# Patient Record
Sex: FEMALE | Employment: OTHER | URBAN - METROPOLITAN AREA
[De-identification: names, ages, dates, MRNs, and addresses within clinical notes are randomized per-mention and may not be internally consistent; named-entity substitution may affect disease eponyms.]

---

## 2020-12-27 ENCOUNTER — OFFICE VISIT (OUTPATIENT)
Dept: URGENT CARE | Facility: CLINIC | Age: 70
End: 2020-12-27
Payer: MEDICARE

## 2020-12-27 VITALS
HEART RATE: 60 BPM | TEMPERATURE: 98 F | SYSTOLIC BLOOD PRESSURE: 163 MMHG | WEIGHT: 214 LBS | OXYGEN SATURATION: 97 % | HEIGHT: 64 IN | RESPIRATION RATE: 16 BRPM | BODY MASS INDEX: 36.54 KG/M2 | DIASTOLIC BLOOD PRESSURE: 75 MMHG

## 2020-12-27 DIAGNOSIS — H00.012 HORDEOLUM EXTERNUM OF RIGHT LOWER EYELID: Primary | ICD-10-CM

## 2020-12-27 PROCEDURE — 99214 OFFICE O/P EST MOD 30 MIN: CPT | Mod: S$GLB,,, | Performed by: NURSE PRACTITIONER

## 2020-12-27 PROCEDURE — 99214 PR OFFICE/OUTPT VISIT, EST, LEVL IV, 30-39 MIN: ICD-10-PCS | Mod: S$GLB,,, | Performed by: NURSE PRACTITIONER

## 2020-12-27 RX ORDER — LISINOPRIL 10 MG/1
TABLET ORAL
COMMUNITY
Start: 2020-12-17 | End: 2022-03-21

## 2020-12-27 RX ORDER — CIPROFLOXACIN HYDROCHLORIDE 3 MG/ML
SOLUTION/ DROPS OPHTHALMIC
COMMUNITY
Start: 2020-12-26 | End: 2021-11-19

## 2020-12-27 RX ORDER — CYANOCOBALAMIN 1000 UG/ML
INJECTION, SOLUTION INTRAMUSCULAR; SUBCUTANEOUS
COMMUNITY
Start: 2020-11-29 | End: 2022-05-12 | Stop reason: SDUPTHER

## 2020-12-27 RX ORDER — A/SINGAPORE/GP1908/2015 IVR-180 (AN A/MICHIGAN/45/2015 (H1N1)PDM09-LIKE VIRUS, A/HONG KONG/4801/2014, NYMC X-263B (H3N2) (AN A/HONG KONG/4801/2014-LIKE VIRUS), AND B/BRISBANE/60/2008, WILD TYPE (A B/BRISBANE/60/2008-LIKE VIRUS) 15; 15; 15 UG/.5ML; UG/.5ML; UG/.5ML
INJECTION, SUSPENSION INTRAMUSCULAR
COMMUNITY
Start: 2020-09-21 | End: 2021-11-19

## 2020-12-27 RX ORDER — KETOROLAC TROMETHAMINE 5 MG/ML
SOLUTION OPHTHALMIC
COMMUNITY
Start: 2020-12-26 | End: 2021-11-19

## 2020-12-27 RX ORDER — LEVOTHYROXINE SODIUM 112 UG/1
TABLET ORAL
COMMUNITY
Start: 2020-12-15 | End: 2021-11-19 | Stop reason: SDUPTHER

## 2020-12-27 NOTE — PATIENT INSTRUCTIONS
STYE   If your condition worsens or fails to improve we recommend that you receive another evaluation at the ER immediately or contact your PCP to discuss your concerns or return here. You must understand that you've received an urgent care treatment only and that you may be released before all your medical problems are known or treated. You the patient will arrange for followup care as instructed.   Use the eye ointment as directed for the full course of therapy.   Warm compresses to affected eye.  Do not wear your contact lens ( if you use them) for at least 5 days after you stop having symptoms and are rechecked by your doctor. Throw away the contacts, contact solution and carrying case you were using and start with new material.   If you develop increase eye symptoms or change in your vision seek medical care immediately either with your ophthalomologist or the ER or return here.

## 2021-06-15 ENCOUNTER — OFFICE VISIT (OUTPATIENT)
Dept: URGENT CARE | Facility: CLINIC | Age: 71
End: 2021-06-15
Payer: MEDICARE

## 2021-06-15 VITALS
BODY MASS INDEX: 36.37 KG/M2 | DIASTOLIC BLOOD PRESSURE: 76 MMHG | HEIGHT: 64 IN | WEIGHT: 213 LBS | TEMPERATURE: 99 F | OXYGEN SATURATION: 98 % | SYSTOLIC BLOOD PRESSURE: 125 MMHG | RESPIRATION RATE: 16 BRPM | HEART RATE: 64 BPM

## 2021-06-15 DIAGNOSIS — S00.83XA CONTUSION OF CHIN, INITIAL ENCOUNTER: ICD-10-CM

## 2021-06-15 DIAGNOSIS — S29.019A ACUTE THORACIC MYOFASCIAL STRAIN, INITIAL ENCOUNTER: ICD-10-CM
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
## 2021-06-15 DIAGNOSIS — S40.022A CONTUSION OF LEFT UPPER ARM, INITIAL ENCOUNTER: ICD-10-CM

## 2021-06-15 PROCEDURE — 99214 PR OFFICE/OUTPT VISIT, EST, LEVL IV, 30-39 MIN: ICD-10-PCS | Mod: 25,S$GLB,, | Performed by: INTERNAL MEDICINE

## 2021-06-15 PROCEDURE — 3008F PR BODY MASS INDEX (BMI) DOCUMENTED: ICD-10-PCS | Mod: S$GLB,,, | Performed by: INTERNAL MEDICINE

## 2021-06-15 PROCEDURE — 96372 PR INJECTION,THERAP/PROPH/DIAG2ST, IM OR SUBCUT: ICD-10-PCS | Mod: S$GLB,,, | Performed by: INTERNAL MEDICINE

## 2021-06-15 PROCEDURE — 3008F BODY MASS INDEX DOCD: CPT | Mod: S$GLB,,, | Performed by: INTERNAL MEDICINE

## 2021-06-15 PROCEDURE — 99214 OFFICE O/P EST MOD 30 MIN: CPT | Mod: 25,S$GLB,, | Performed by: INTERNAL MEDICINE

## 2021-06-15 PROCEDURE — 96372 THER/PROPH/DIAG INJ SC/IM: CPT | Mod: S$GLB,,, | Performed by: INTERNAL MEDICINE

## 2021-06-15 RX ORDER — METHYLPREDNISOLONE 4 MG/1
TABLET ORAL
Qty: 1 PACKAGE | Refills: 0 | Status: SHIPPED | OUTPATIENT
Start: 2021-06-15 | End: 2021-11-19

## 2021-06-15 RX ORDER — CYCLOBENZAPRINE HCL 5 MG
5 TABLET ORAL 3 TIMES DAILY PRN
Qty: 21 TABLET | Refills: 0 | Status: SHIPPED | OUTPATIENT
Start: 2021-06-15 | End: 2021-06-25

## 2021-06-15 RX ORDER — KETOROLAC TROMETHAMINE 30 MG/ML
30 INJECTION, SOLUTION INTRAMUSCULAR; INTRAVENOUS
Status: COMPLETED | OUTPATIENT
Start: 2021-06-15 | End: 2021-06-15

## 2021-06-15 RX ADMIN — KETOROLAC TROMETHAMINE 30 MG: 30 INJECTION, SOLUTION INTRAMUSCULAR; INTRAVENOUS at 04:06

## 2021-10-19 ENCOUNTER — TELEPHONE (OUTPATIENT)
Dept: INTERNAL MEDICINE | Facility: CLINIC | Age: 71
End: 2021-10-19

## 2021-10-23 ENCOUNTER — IMMUNIZATION (OUTPATIENT)
Dept: PRIMARY CARE CLINIC | Facility: CLINIC | Age: 71
End: 2021-10-23
Payer: MEDICARE

## 2021-10-23 DIAGNOSIS — Z23 NEED FOR VACCINATION: Primary | ICD-10-CM

## 2021-10-23 PROCEDURE — 0013A COVID-19, MRNA, LNP-S, PF, 100 MCG/0.5 ML DOSE VACCINE: CPT | Mod: CV19,PBBFAC | Performed by: INTERNAL MEDICINE

## 2021-10-23 PROCEDURE — 91301 COVID-19, MRNA, LNP-S, PF, 100 MCG/0.5 ML DOSE VACCINE: CPT | Mod: PBBFAC | Performed by: INTERNAL MEDICINE

## 2021-11-19 ENCOUNTER — LAB VISIT (OUTPATIENT)
Dept: LAB | Facility: HOSPITAL | Age: 71
End: 2021-11-19
Attending: STUDENT IN AN ORGANIZED HEALTH CARE EDUCATION/TRAINING PROGRAM
Payer: MEDICARE

## 2021-11-19 ENCOUNTER — OFFICE VISIT (OUTPATIENT)
Dept: INTERNAL MEDICINE | Facility: CLINIC | Age: 71
End: 2021-11-19
Payer: MEDICARE

## 2021-11-19 VITALS
BODY MASS INDEX: 34.85 KG/M2 | HEART RATE: 61 BPM | SYSTOLIC BLOOD PRESSURE: 106 MMHG | HEIGHT: 65 IN | WEIGHT: 209.19 LBS | DIASTOLIC BLOOD PRESSURE: 60 MMHG | OXYGEN SATURATION: 99 %

## 2021-11-19 DIAGNOSIS — E03.9 ACQUIRED HYPOTHYROIDISM: ICD-10-CM

## 2021-11-19 DIAGNOSIS — M85.80 OSTEOPENIA, UNSPECIFIED LOCATION: ICD-10-CM

## 2021-11-19 DIAGNOSIS — I10 ESSENTIAL HYPERTENSION: ICD-10-CM

## 2021-11-19 DIAGNOSIS — Z98.84 HISTORY OF ROUX-EN-Y GASTRIC BYPASS: ICD-10-CM

## 2021-11-19 DIAGNOSIS — R01.1 SYSTOLIC MURMUR: ICD-10-CM

## 2021-11-19 DIAGNOSIS — D52.0 DIETARY FOLATE DEFICIENCY ANEMIA: ICD-10-CM

## 2021-11-19 DIAGNOSIS — E66.9 OBESITY (BMI 30.0-34.9): ICD-10-CM

## 2021-11-19 DIAGNOSIS — D51.3 OTHER DIETARY VITAMIN B12 DEFICIENCY ANEMIA: ICD-10-CM

## 2021-11-19 DIAGNOSIS — L30.9 ECZEMA OF BOTH HANDS: ICD-10-CM

## 2021-11-19 DIAGNOSIS — Z12.31 ENCOUNTER FOR SCREENING MAMMOGRAM FOR MALIGNANT NEOPLASM OF BREAST: ICD-10-CM

## 2021-11-19 DIAGNOSIS — R79.9 ABNORMAL FINDING OF BLOOD CHEMISTRY, UNSPECIFIED: ICD-10-CM

## 2021-11-19 DIAGNOSIS — Z76.89 ENCOUNTER TO ESTABLISH CARE WITH NEW DOCTOR: Primary | ICD-10-CM

## 2021-11-19 PROBLEM — N18.31 STAGE 3A CHRONIC KIDNEY DISEASE: Status: ACTIVE | Noted: 2021-11-19

## 2021-11-19 PROBLEM — E66.01 SEVERE OBESITY (BMI 35.0-39.9) WITH COMORBIDITY: Status: ACTIVE | Noted: 2021-11-19

## 2021-11-19 PROBLEM — E55.9 VITAMIN D DEFICIENCY: Status: ACTIVE | Noted: 2021-11-19

## 2021-11-19 LAB
25(OH)D3+25(OH)D2 SERPL-MCNC: 51 NG/ML (ref 30–96)
ALBUMIN SERPL BCP-MCNC: 4 G/DL (ref 3.5–5.2)
ALP SERPL-CCNC: 72 U/L (ref 55–135)
ALT SERPL W/O P-5'-P-CCNC: 17 U/L (ref 10–44)
ANION GAP SERPL CALC-SCNC: 10 MMOL/L (ref 8–16)
AST SERPL-CCNC: 25 U/L (ref 10–40)
BASOPHILS # BLD AUTO: 0.03 K/UL (ref 0–0.2)
BASOPHILS NFR BLD: 0.7 % (ref 0–1.9)
BILIRUB SERPL-MCNC: 0.7 MG/DL (ref 0.1–1)
BUN SERPL-MCNC: 29 MG/DL (ref 8–23)
CALCIUM SERPL-MCNC: 10.3 MG/DL (ref 8.7–10.5)
CHLORIDE SERPL-SCNC: 104 MMOL/L (ref 95–110)
CHOLEST SERPL-MCNC: 198 MG/DL (ref 120–199)
CHOLEST/HDLC SERPL: 2.9 {RATIO} (ref 2–5)
CO2 SERPL-SCNC: 24 MMOL/L (ref 23–29)
CREAT SERPL-MCNC: 1.2 MG/DL (ref 0.5–1.4)
DIFFERENTIAL METHOD: ABNORMAL
EOSINOPHIL # BLD AUTO: 0.3 K/UL (ref 0–0.5)
EOSINOPHIL NFR BLD: 6.2 % (ref 0–8)
ERYTHROCYTE [DISTWIDTH] IN BLOOD BY AUTOMATED COUNT: 12.5 % (ref 11.5–14.5)
EST. GFR  (AFRICAN AMERICAN): 53.7 ML/MIN/1.73 M^2
EST. GFR  (NON AFRICAN AMERICAN): 46.5 ML/MIN/1.73 M^2
ESTIMATED AVG GLUCOSE: 100 MG/DL (ref 68–131)
FERRITIN SERPL-MCNC: 30 NG/ML (ref 20–300)
FOLATE SERPL-MCNC: 17.3 NG/ML (ref 4–24)
GLUCOSE SERPL-MCNC: 84 MG/DL (ref 70–110)
HBA1C MFR BLD: 5.1 % (ref 4–5.6)
HCT VFR BLD AUTO: 44.4 % (ref 37–48.5)
HDLC SERPL-MCNC: 68 MG/DL (ref 40–75)
HDLC SERPL: 34.3 % (ref 20–50)
HGB BLD-MCNC: 14.3 G/DL (ref 12–16)
IMM GRANULOCYTES # BLD AUTO: 0.05 K/UL (ref 0–0.04)
IMM GRANULOCYTES NFR BLD AUTO: 1.1 % (ref 0–0.5)
LDLC SERPL CALC-MCNC: 110 MG/DL (ref 63–159)
LYMPHOCYTES # BLD AUTO: 1.5 K/UL (ref 1–4.8)
LYMPHOCYTES NFR BLD: 32.5 % (ref 18–48)
MAGNESIUM SERPL-MCNC: 2.3 MG/DL (ref 1.6–2.6)
MCH RBC QN AUTO: 29.1 PG (ref 27–31)
MCHC RBC AUTO-ENTMCNC: 32.2 G/DL (ref 32–36)
MCV RBC AUTO: 90 FL (ref 82–98)
MONOCYTES # BLD AUTO: 0.3 K/UL (ref 0.3–1)
MONOCYTES NFR BLD: 6.6 % (ref 4–15)
NEUTROPHILS # BLD AUTO: 2.4 K/UL (ref 1.8–7.7)
NEUTROPHILS NFR BLD: 52.9 % (ref 38–73)
NONHDLC SERPL-MCNC: 130 MG/DL
NRBC BLD-RTO: 0 /100 WBC
PLATELET # BLD AUTO: 220 K/UL (ref 150–450)
PMV BLD AUTO: 10.8 FL (ref 9.2–12.9)
POTASSIUM SERPL-SCNC: 4.6 MMOL/L (ref 3.5–5.1)
PROT SERPL-MCNC: 7.7 G/DL (ref 6–8.4)
RBC # BLD AUTO: 4.91 M/UL (ref 4–5.4)
SODIUM SERPL-SCNC: 138 MMOL/L (ref 136–145)
T4 FREE SERPL-MCNC: 1.2 NG/DL (ref 0.71–1.51)
TRIGL SERPL-MCNC: 100 MG/DL (ref 30–150)
TSH SERPL DL<=0.005 MIU/L-ACNC: 1.76 UIU/ML (ref 0.4–4)
VIT B12 SERPL-MCNC: 1186 PG/ML (ref 210–950)
WBC # BLD AUTO: 4.52 K/UL (ref 3.9–12.7)

## 2021-11-19 PROCEDURE — 82607 VITAMIN B-12: CPT | Performed by: STUDENT IN AN ORGANIZED HEALTH CARE EDUCATION/TRAINING PROGRAM

## 2021-11-19 PROCEDURE — 82728 ASSAY OF FERRITIN: CPT | Performed by: STUDENT IN AN ORGANIZED HEALTH CARE EDUCATION/TRAINING PROGRAM

## 2021-11-19 PROCEDURE — 80061 LIPID PANEL: CPT | Performed by: STUDENT IN AN ORGANIZED HEALTH CARE EDUCATION/TRAINING PROGRAM

## 2021-11-19 PROCEDURE — 4010F ACE/ARB THERAPY RXD/TAKEN: CPT | Mod: CPTII,S$GLB,, | Performed by: STUDENT IN AN ORGANIZED HEALTH CARE EDUCATION/TRAINING PROGRAM

## 2021-11-19 PROCEDURE — 82746 ASSAY OF FOLIC ACID SERUM: CPT | Performed by: STUDENT IN AN ORGANIZED HEALTH CARE EDUCATION/TRAINING PROGRAM

## 2021-11-19 PROCEDURE — 99213 OFFICE O/P EST LOW 20 MIN: CPT | Mod: PBBFAC | Performed by: STUDENT IN AN ORGANIZED HEALTH CARE EDUCATION/TRAINING PROGRAM

## 2021-11-19 PROCEDURE — 36415 COLL VENOUS BLD VENIPUNCTURE: CPT | Performed by: STUDENT IN AN ORGANIZED HEALTH CARE EDUCATION/TRAINING PROGRAM

## 2021-11-19 PROCEDURE — 99999 PR PBB SHADOW E&M-EST. PATIENT-LVL III: ICD-10-PCS | Mod: PBBFAC,,, | Performed by: STUDENT IN AN ORGANIZED HEALTH CARE EDUCATION/TRAINING PROGRAM

## 2021-11-19 PROCEDURE — 80053 COMPREHEN METABOLIC PANEL: CPT | Performed by: STUDENT IN AN ORGANIZED HEALTH CARE EDUCATION/TRAINING PROGRAM

## 2021-11-19 PROCEDURE — 83735 ASSAY OF MAGNESIUM: CPT | Performed by: STUDENT IN AN ORGANIZED HEALTH CARE EDUCATION/TRAINING PROGRAM

## 2021-11-19 PROCEDURE — 99387 PR PREVENTIVE VISIT,NEW,65 & OVER: ICD-10-PCS | Mod: GZ,S$GLB,, | Performed by: STUDENT IN AN ORGANIZED HEALTH CARE EDUCATION/TRAINING PROGRAM

## 2021-11-19 PROCEDURE — 99387 INIT PM E/M NEW PAT 65+ YRS: CPT | Mod: GZ,S$GLB,, | Performed by: STUDENT IN AN ORGANIZED HEALTH CARE EDUCATION/TRAINING PROGRAM

## 2021-11-19 PROCEDURE — 4010F PR ACE/ARB THEARPY RXD/TAKEN: ICD-10-PCS | Mod: CPTII,S$GLB,, | Performed by: STUDENT IN AN ORGANIZED HEALTH CARE EDUCATION/TRAINING PROGRAM

## 2021-11-19 PROCEDURE — 83036 HEMOGLOBIN GLYCOSYLATED A1C: CPT | Performed by: STUDENT IN AN ORGANIZED HEALTH CARE EDUCATION/TRAINING PROGRAM

## 2021-11-19 PROCEDURE — 85025 COMPLETE CBC W/AUTO DIFF WBC: CPT | Performed by: STUDENT IN AN ORGANIZED HEALTH CARE EDUCATION/TRAINING PROGRAM

## 2021-11-19 PROCEDURE — 84443 ASSAY THYROID STIM HORMONE: CPT | Performed by: STUDENT IN AN ORGANIZED HEALTH CARE EDUCATION/TRAINING PROGRAM

## 2021-11-19 PROCEDURE — 99999 PR PBB SHADOW E&M-EST. PATIENT-LVL III: CPT | Mod: PBBFAC,,, | Performed by: STUDENT IN AN ORGANIZED HEALTH CARE EDUCATION/TRAINING PROGRAM

## 2021-11-19 PROCEDURE — 82306 VITAMIN D 25 HYDROXY: CPT | Performed by: STUDENT IN AN ORGANIZED HEALTH CARE EDUCATION/TRAINING PROGRAM

## 2021-11-19 PROCEDURE — 84439 ASSAY OF FREE THYROXINE: CPT | Performed by: STUDENT IN AN ORGANIZED HEALTH CARE EDUCATION/TRAINING PROGRAM

## 2021-11-19 RX ORDER — LANOLIN ALCOHOL/MO/W.PET/CERES
1 CREAM (GRAM) TOPICAL 2 TIMES DAILY
COMMUNITY

## 2021-11-19 RX ORDER — HYDROCORTISONE 25 MG/G
CREAM TOPICAL 2 TIMES DAILY
Qty: 3.5 G | Refills: 2 | Status: SHIPPED | OUTPATIENT
Start: 2021-11-19 | End: 2022-09-01

## 2021-11-19 RX ORDER — LEVOTHYROXINE SODIUM 112 UG/1
112 TABLET ORAL
Qty: 90 TABLET | Refills: 3 | Status: SHIPPED | OUTPATIENT
Start: 2021-11-19 | End: 2022-10-18

## 2021-11-23 ENCOUNTER — HOSPITAL ENCOUNTER (OUTPATIENT)
Dept: CARDIOLOGY | Facility: HOSPITAL | Age: 71
Discharge: HOME | End: 2021-11-23
Attending: STUDENT IN AN ORGANIZED HEALTH CARE EDUCATION/TRAINING PROGRAM
Payer: MEDICARE

## 2021-11-23 VITALS
SYSTOLIC BLOOD PRESSURE: 108 MMHG | HEIGHT: 65 IN | WEIGHT: 209 LBS | BODY MASS INDEX: 34.82 KG/M2 | HEART RATE: 51 BPM | DIASTOLIC BLOOD PRESSURE: 70 MMHG

## 2021-11-23 DIAGNOSIS — R01.1 SYSTOLIC MURMUR: ICD-10-CM

## 2021-11-23 LAB
ASCENDING AORTA: 2.75 CM
AV INDEX (PROSTH): 0.64
AV MEAN GRADIENT: 8 MMHG
AV PEAK GRADIENT: 16 MMHG
AV VALVE AREA: 2.11 CM2
AV VELOCITY RATIO: 0.68
BSA FOR ECHO PROCEDURE: 2.08 M2
CV ECHO LV RWT: 0.3 CM
DOP CALC AO PEAK VEL: 1.99 M/S
DOP CALC AO VTI: 48.76 CM
DOP CALC LVOT AREA: 3.3 CM2
DOP CALC LVOT DIAMETER: 2.05 CM
DOP CALC LVOT PEAK VEL: 1.35 M/S
DOP CALC LVOT STROKE VOLUME: 103.13 CM3
DOP CALCLVOT PEAK VEL VTI: 31.26 CM
E WAVE DECELERATION TIME: 302.74 MSEC
E/A RATIO: 0.86
E/E' RATIO: 8.59 M/S
ECHO LV POSTERIOR WALL: 0.57 CM (ref 0.6–1.1)
EJECTION FRACTION: 65 %
FRACTIONAL SHORTENING: 23 % (ref 28–44)
INTERVENTRICULAR SEPTUM: 0.8 CM (ref 0.6–1.1)
IVRT: 99.9 MSEC
LA MAJOR: 5.82 CM
LA MINOR: 5.77 CM
LA WIDTH: 4.24 CM
LEFT ATRIUM SIZE: 3.61 CM
LEFT ATRIUM VOLUME INDEX MOD: 32.4 ML/M2
LEFT ATRIUM VOLUME INDEX: 37.3 ML/M2
LEFT ATRIUM VOLUME MOD: 65.38 CM3
LEFT ATRIUM VOLUME: 75.39 CM3
LEFT INTERNAL DIMENSION IN SYSTOLE: 2.97 CM (ref 2.1–4)
LEFT VENTRICLE DIASTOLIC VOLUME INDEX: 31.39 ML/M2
LEFT VENTRICLE DIASTOLIC VOLUME: 63.41 ML
LEFT VENTRICLE MASS INDEX: 35 G/M2
LEFT VENTRICLE SYSTOLIC VOLUME INDEX: 16.8 ML/M2
LEFT VENTRICLE SYSTOLIC VOLUME: 34.02 ML
LEFT VENTRICULAR INTERNAL DIMENSION IN DIASTOLE: 3.84 CM (ref 3.5–6)
LEFT VENTRICULAR MASS: 71.15 G
LV LATERAL E/E' RATIO: 8.11 M/S
LV SEPTAL E/E' RATIO: 9.13 M/S
MV A" WAVE DURATION": 16.37 MSEC
MV PEAK A VEL: 0.85 M/S
MV PEAK E VEL: 0.73 M/S
MV STENOSIS PRESSURE HALF TIME: 87.8 MS
MV VALVE AREA P 1/2 METHOD: 2.51 CM2
PISA TR MAX VEL: 2.46 M/S
PULM VEIN S/D RATIO: 1.48
PV PEAK D VEL: 0.25 M/S
PV PEAK S VEL: 0.37 M/S
RA MAJOR: 5.23 CM
RA PRESSURE: 3 MMHG
RA WIDTH: 4.26 CM
RIGHT VENTRICULAR END-DIASTOLIC DIMENSION: 3.85 CM
RV TISSUE DOPPLER FREE WALL SYSTOLIC VELOCITY 1 (APICAL 4 CHAMBER VIEW): 10.24 CM/S
SINUS: 2.7 CM
STJ: 2.27 CM
TDI LATERAL: 0.09 M/S
TDI SEPTAL: 0.08 M/S
TDI: 0.09 M/S
TR MAX PG: 24 MMHG
TRICUSPID ANNULAR PLANE SYSTOLIC EXCURSION: 2.33 CM
TV REST PULMONARY ARTERY PRESSURE: 27 MMHG

## 2021-11-23 PROCEDURE — 93306 TTE W/DOPPLER COMPLETE: CPT

## 2021-11-23 PROCEDURE — 93306 ECHO (CUPID ONLY): ICD-10-PCS | Mod: 26,,, | Performed by: INTERNAL MEDICINE

## 2021-11-23 PROCEDURE — 93306 TTE W/DOPPLER COMPLETE: CPT | Mod: 26,,, | Performed by: INTERNAL MEDICINE

## 2021-12-28 ENCOUNTER — HOSPITAL ENCOUNTER (OUTPATIENT)
Dept: RADIOLOGY | Facility: HOSPITAL | Age: 71
Discharge: HOME | End: 2021-12-28
Attending: STUDENT IN AN ORGANIZED HEALTH CARE EDUCATION/TRAINING PROGRAM
Payer: MEDICARE

## 2021-12-28 VITALS — WEIGHT: 209 LBS | HEIGHT: 65 IN | BODY MASS INDEX: 34.82 KG/M2

## 2021-12-28 DIAGNOSIS — Z12.31 ENCOUNTER FOR SCREENING MAMMOGRAM FOR MALIGNANT NEOPLASM OF BREAST: ICD-10-CM

## 2021-12-28 PROCEDURE — 77063 BREAST TOMOSYNTHESIS BI: CPT | Mod: 26,,, | Performed by: RADIOLOGY

## 2021-12-28 PROCEDURE — 77067 SCR MAMMO BI INCL CAD: CPT | Mod: TC

## 2021-12-28 PROCEDURE — 77067 SCR MAMMO BI INCL CAD: CPT | Mod: 26,,, | Performed by: RADIOLOGY

## 2021-12-28 PROCEDURE — 77063 MAMMO DIGITAL SCREENING BILAT WITH TOMO: ICD-10-PCS | Mod: 26,,, | Performed by: RADIOLOGY

## 2021-12-28 PROCEDURE — 77067 MAMMO DIGITAL SCREENING BILAT WITH TOMO: ICD-10-PCS | Mod: 26,,, | Performed by: RADIOLOGY

## 2022-01-19 ENCOUNTER — TELEPHONE (OUTPATIENT)
Dept: INTERNAL MEDICINE | Facility: CLINIC | Age: 72
End: 2022-01-19
Payer: MEDICARE

## 2022-01-19 NOTE — TELEPHONE ENCOUNTER
----- Message from Venus Kolb sent at 1/19/2022  9:25 AM CST -----  Contact: Mina [phone]  Requesting an RX refill or new RX.    Is this a refill or new RX: --Refill--    RX name and strength (copy/paste from chart):    [id number]    Is this a 30 day or 90 day RX: --30-days--    Patient advised that in the future they can use their [facility] account to request a refill?: --Yes--    Patient advised that RX refills can take up to 72 hours?:--Yes--    Pharmacy name and phone # (copy/paste from chart):    [facility]/pharmacy #[id number] - [place] - [address]  [address]  [place] [address]  Phone: [phone] Fax: [phone]      Comments: Refill request needed for the medication listed above.

## 2022-04-04 ENCOUNTER — IMMUNIZATION (OUTPATIENT)
Dept: PHARMACY | Facility: CLINIC | Age: 72
End: 2022-04-04
Payer: MEDICARE

## 2022-04-04 DIAGNOSIS — Z23 NEED FOR VACCINATION: Primary | ICD-10-CM

## 2022-04-21 NOTE — TELEPHONE ENCOUNTER
----- Message from Patricia Mendeskaylie sent at 4/21/2022 11:20 AM CDT -----  Contact: Patient @ [phone]  Requesting an RX refill or new RX.  Is this a refill or new RX:   RX name and strength cyanocobalamin 1,000 mcg/mL injection  Is this a 30 day or 90 day RX:   Pharmacy name and phone #[facility]/pharmacy #[id number] - [place], [place] - [address]  The doctors have asked that we provide their patients with the following 2 reminders -- prescription refills can take up to 72 hours, and a friendly reminder that in the future you can use your [facility] account to request refills: yes

## 2022-04-22 NOTE — TELEPHONE ENCOUNTER
Refill Routing Note   Medication(s) are not appropriate for processing by [facility] for the following reason(s):      - Outside of protocol    [facility] action(s):  Route          Medication reconciliation completed: No     Appointments  past 12m or future 3m with PCP    Date Provider   Last Visit   11/19/2021 Dagmar Nation MD   Next Visit   Visit date not found Dagmar Nation MD   ED visits in past 90 days: 0        Note composed:1:37 PM 04/22/2022

## 2022-04-22 NOTE — TELEPHONE ENCOUNTER
Can you call patient to clarify how she takes the vitamin D? Is this once a week? Once a month? Does she inject herself or make appointments for our nurses to do the injection? Thank you.

## 2022-04-25 RX ORDER — CYANOCOBALAMIN 1000 UG/ML
INJECTION, SOLUTION INTRAMUSCULAR; SUBCUTANEOUS
OUTPATIENT
Start: 2022-04-25

## 2022-04-25 NOTE — TELEPHONE ENCOUNTER
Provider Staff:     Action required for this patient.    Please note Refusal of medication.            Requested Prescriptions     Refused Prescriptions Disp Refills    cyanocobalamin 1,000 mcg/mL injection       Refused By: LATONIA PICKARD     Reason for Refusal: Refill not appropriate      Thanks!  [facility]   Note composed: 04/25/2022 2:53 PM

## 2022-04-25 NOTE — TELEPHONE ENCOUNTER
Medication refused due to failing protocol.    Requested Prescriptions   Pending Prescriptions Disp Refills    cyanocobalamin 1,000 mcg/mL injection         There is no refill protocol information for this order           I don't know why this patient is taking this medication or how often. Please call to clarify.

## 2022-05-12 RX ORDER — CYANOCOBALAMIN 1000 UG/ML
1000 INJECTION, SOLUTION INTRAMUSCULAR; SUBCUTANEOUS
Qty: 1 ML | Refills: 11 | Status: SHIPPED | OUTPATIENT
Start: 2022-05-12 | End: 2022-10-18

## 2022-05-12 NOTE — TELEPHONE ENCOUNTER
Refill Routing Note   Medication(s) are not appropriate for processing by [facility] for the following reason(s):      - Outside of protocol    [facility] action(s):  Route          Medication reconciliation completed: No     Appointments  past 12m or future 3m with PCP    Date Provider   Last Visit   11/19/2021 Dagmar Nation MD   Next Visit   Visit date not found Dagmar Nation MD   ED visits in past 90 days: 0        Note composed:10:11 AM 05/12/2022

## 2022-05-12 NOTE — TELEPHONE ENCOUNTER
----- Message from Debbie Mistry sent at 5/12/2022  9:03 AM CDT -----  Contact: [phone]  Requesting an RX refill or new RX.  Is this a refill or new RX: refill  RX name and strength :  cyanocobalamin 1,000 mcg/mL injection  Is this a 30 day or 90 day RX:   Pharmacy name and phone # :  [facility]/pharmacy #[id number]  [place] - [address]   Phone:  [phone]  Fax:  [phone]        The doctors have asked that we provide their patients with the following 2 reminders -- prescription refills can take up to 72 hours, and a friendly reminder that in the future you can use your [facility] account to request refills: 2nd  request , please call patient to confirm. Patient is out of this medication.

## 2022-05-16 NOTE — TELEPHONE ENCOUNTER
No new care gaps identified.  [facility] Embedded Care Gaps. Reference number: [id number]. 5/16/2022   12:11:12 AM CDT

## 2022-05-17 RX ORDER — LISINOPRIL 10 MG/1
TABLET ORAL
Qty: 90 TABLET | Refills: 1 | Status: SHIPPED | OUTPATIENT
Start: 2022-05-17 | End: 2022-10-18

## 2022-05-17 NOTE — TELEPHONE ENCOUNTER
Refill Routing Note   Medication(s) are not appropriate for processing by [facility] for the following reason(s):      - Medication is a new start (<3 months)    [facility] action(s):  Defer       Medication Therapy Plan: Therapy under PCP signature/supervision less than 90 days  Medication reconciliation completed: No     Appointments  past 12m or future 3m with PCP    Date Provider   Last Visit   11/19/2021 Dagmar Nation MD   Next Visit   Visit date not found Dagmar Nation MD   ED visits in past 90 days: 0        Note composed:9:03 PM 05/16/2022

## 2022-09-01 ENCOUNTER — OFFICE VISIT (OUTPATIENT)
Dept: PRIMARY CARE CLINIC | Facility: CLINIC | Age: 72
End: 2022-09-01
Payer: MEDICARE

## 2022-09-01 VITALS
TEMPERATURE: 98 F | WEIGHT: 217.63 LBS | DIASTOLIC BLOOD PRESSURE: 62 MMHG | OXYGEN SATURATION: 99 % | HEART RATE: 54 BPM | HEIGHT: 65 IN | SYSTOLIC BLOOD PRESSURE: 124 MMHG | BODY MASS INDEX: 36.26 KG/M2

## 2022-09-01 DIAGNOSIS — R10.13 EPIGASTRIC PAIN: ICD-10-CM

## 2022-09-01 DIAGNOSIS — I10 ESSENTIAL HYPERTENSION: ICD-10-CM

## 2022-09-01 DIAGNOSIS — M62.08 DIASTASIS RECTI: ICD-10-CM

## 2022-09-01 DIAGNOSIS — E03.9 ACQUIRED HYPOTHYROIDISM: Primary | ICD-10-CM

## 2022-09-01 DIAGNOSIS — R73.9 HYPERGLYCEMIA, UNSPECIFIED: ICD-10-CM

## 2022-09-01 PROCEDURE — 1159F PR MEDICATION LIST DOCUMENTED IN MEDICAL RECORD: ICD-10-PCS | Mod: CPTII,S$GLB,, | Performed by: INTERNAL MEDICINE

## 2022-09-01 PROCEDURE — 3074F PR MOST RECENT SYSTOLIC BLOOD PRESSURE < 130 MM HG: ICD-10-PCS | Mod: CPTII,S$GLB,, | Performed by: INTERNAL MEDICINE

## 2022-09-01 PROCEDURE — 4010F PR ACE/ARB THEARPY RXD/TAKEN: ICD-10-PCS | Mod: CPTII,S$GLB,, | Performed by: INTERNAL MEDICINE

## 2022-09-01 PROCEDURE — 3008F PR BODY MASS INDEX (BMI) DOCUMENTED: ICD-10-PCS | Mod: CPTII,S$GLB,, | Performed by: INTERNAL MEDICINE

## 2022-09-01 PROCEDURE — 99999 PR PBB SHADOW E&M-EST. PATIENT-LVL IV: CPT | Mod: PBBFAC,,, | Performed by: INTERNAL MEDICINE

## 2022-09-01 PROCEDURE — 99999 PR PBB SHADOW E&M-EST. PATIENT-LVL IV: ICD-10-PCS | Mod: PBBFAC,,, | Performed by: INTERNAL MEDICINE

## 2022-09-01 PROCEDURE — 3008F BODY MASS INDEX DOCD: CPT | Mod: CPTII,S$GLB,, | Performed by: INTERNAL MEDICINE

## 2022-09-01 PROCEDURE — 99214 PR OFFICE/OUTPT VISIT, EST, LEVL IV, 30-39 MIN: ICD-10-PCS | Mod: S$GLB,,, | Performed by: INTERNAL MEDICINE

## 2022-09-01 PROCEDURE — 1159F MED LIST DOCD IN RCRD: CPT | Mod: CPTII,S$GLB,, | Performed by: INTERNAL MEDICINE

## 2022-09-01 PROCEDURE — 4010F ACE/ARB THERAPY RXD/TAKEN: CPT | Mod: CPTII,S$GLB,, | Performed by: INTERNAL MEDICINE

## 2022-09-01 PROCEDURE — 3288F FALL RISK ASSESSMENT DOCD: CPT | Mod: CPTII,S$GLB,, | Performed by: INTERNAL MEDICINE

## 2022-09-01 PROCEDURE — 1101F PT FALLS ASSESS-DOCD LE1/YR: CPT | Mod: CPTII,S$GLB,, | Performed by: INTERNAL MEDICINE

## 2022-09-01 PROCEDURE — 3078F PR MOST RECENT DIASTOLIC BLOOD PRESSURE < 80 MM HG: ICD-10-PCS | Mod: CPTII,S$GLB,, | Performed by: INTERNAL MEDICINE

## 2022-09-01 PROCEDURE — 3078F DIAST BP <80 MM HG: CPT | Mod: CPTII,S$GLB,, | Performed by: INTERNAL MEDICINE

## 2022-09-01 PROCEDURE — 1126F AMNT PAIN NOTED NONE PRSNT: CPT | Mod: CPTII,S$GLB,, | Performed by: INTERNAL MEDICINE

## 2022-09-01 PROCEDURE — 3288F PR FALLS RISK ASSESSMENT DOCUMENTED: ICD-10-PCS | Mod: CPTII,S$GLB,, | Performed by: INTERNAL MEDICINE

## 2022-09-01 PROCEDURE — 1101F PR PT FALLS ASSESS DOC 0-1 FALLS W/OUT INJ PAST YR: ICD-10-PCS | Mod: CPTII,S$GLB,, | Performed by: INTERNAL MEDICINE

## 2022-09-01 PROCEDURE — 1126F PR PAIN SEVERITY QUANTIFIED, NO PAIN PRESENT: ICD-10-PCS | Mod: CPTII,S$GLB,, | Performed by: INTERNAL MEDICINE

## 2022-09-01 PROCEDURE — 3074F SYST BP LT 130 MM HG: CPT | Mod: CPTII,S$GLB,, | Performed by: INTERNAL MEDICINE

## 2022-09-01 PROCEDURE — 99214 OFFICE O/P EST MOD 30 MIN: CPT | Mod: S$GLB,,, | Performed by: INTERNAL MEDICINE

## 2022-09-01 RX ORDER — FAMOTIDINE 20 MG/1
20 TABLET, FILM COATED ORAL 2 TIMES DAILY
Qty: 60 TABLET | Refills: 3 | Status: SHIPPED | OUTPATIENT
Start: 2022-09-01 | End: 2024-03-11

## 2022-09-01 NOTE — PROGRESS NOTES
[facility] Progress Note  9/1/2022          Reason for Visit:      had concerns including Hernia and Gastroesophageal Reflux.       History of Present Illness:     Pleasant patient is here today for a concern about epigastric pain.  It has been a burning pain in the epigastric area that is associated with water brash/regurgitation.  She has a remote history of gastric bypass  about 15 years ago.  She had h. Pylori about 5 years ago, and her  was just recently diagnosed with H. Pylori.  She says these symptoms just recently began in the past 1-2 months. She hasn't taken any medication for this so far.  She does have a bulging in her abdomen that is not causing any pain and has been there several months.      Problem List:     Patient Active Problem List   Diagnosis    Essential hypertension    Acquired hypothyroidism    Osteopenia    History of Naga-en-Y gastric bypass    Severe obesity (BMI 35.0-39.9) with comorbidity    Stage 3a chronic kidney disease    Vitamin D deficiency         Surgical History:     She has a past surgical history that includes Gastric bypass and Eye surgery.      Family History:      Her family history includes Diabetes in her sister; Heart disease in her father and mother; Protein C deficiency in her sister; Protein S deficiency in her sister; Rectal cancer in her sister.      Social History:     She reports that she quit smoking about 42 years ago. Her smoking use included cigarettes. She has a 10.00 pack-year smoking history. She has never used smokeless tobacco. She reports that she does not drink alcohol and does not use drugs.      Medications:       Current Outpatient Medications:     calcium citrate-vitamin D3 315-200 mg (CITRACAL+D) 315 mg-5 mcg (200 unit) per tablet, Take 1 tablet by mouth 2 (two) times daily., Disp: , Rfl:     cyanocobalamin 1,000 mcg/mL injection, Inject 1 mL (1,000 mcg total) into the muscle every 30 days., Disp: 1 mL, Rfl: 11     "levothyroxine (SYNTHROID) 112 MCG tablet, Take 1 tablet (112 mcg total) by mouth before breakfast., Disp: 90 tablet, Rfl: 3    lisinopriL 10 MG tablet, TAKE 1 TABLET BY MOUTH EVERY DAY, Disp: 90 tablet, Rfl: 1    multivitamin capsule, Take 1 capsule by mouth once daily., Disp: , Rfl:     famotidine (PEPCID) 20 MG tablet, Take 1 tablet (20 mg total) by mouth 2 (two) times daily., Disp: 60 tablet, Rfl: 3        Allergies:   She has No Known Allergies.      Review of Systems:     Review of Systems   Constitutional:  Negative for chills and fever.   HENT:  Negative for ear pain and sore throat.    Respiratory:  Negative for cough, shortness of breath and wheezing.    Cardiovascular:  Negative for chest pain and palpitations.   Gastrointestinal:  Negative for constipation, diarrhea, nausea and vomiting.   Genitourinary:  Negative for dysuria and hematuria.   Musculoskeletal:  Negative for joint swelling and joint deformity.   Neurological:  Negative for dizziness and weakness.         Physical Exam:     Temp:    98.3 °F (36.8 °C) (Oral)        Blood Pressure:  124/62        Pulse:   (!) 54     Respirations:     Weight:   98.7 kg (217 lb 9.5 oz)  Height:   5' 5" (1.651 m)  BMI:     Body mass index is 36.21 kg/m².    Physical Exam  Constitutional:       General: She is not in acute distress.  HENT:      Right Ear: Tympanic membrane normal.      Left Ear: Tympanic membrane normal.      Nose: No congestion or rhinorrhea.      Mouth/Throat:      Pharynx: No oropharyngeal exudate or posterior oropharyngeal erythema.   Cardiovascular:      Rate and Rhythm: Normal rate and regular rhythm.   Pulmonary:      Effort: Pulmonary effort is normal. No respiratory distress.      Breath sounds: No wheezing.   Abdominal:      Palpations: Abdomen is soft.      Tenderness: There is no abdominal tenderness.   Skin:     General: Skin is warm.   Neurological:      General: No focal deficit present.      Mental Status: She is alert and oriented " to person, place, and time.         Labs/Imaging/Testing:     Most recent CBC:  Lab Results   Component Value Date    WBC 4.52 11/19/2021    HGB 14.3 11/19/2021     11/19/2021    MCV 90 11/19/2021       Most recent Lipids:  Lab Results   Component Value Date    CHOL 198 11/19/2021    HDL 68 11/19/2021    LDLCALC 110.0 11/19/2021    TRIG 100 11/19/2021       Most recent Chemistry:  Lab Results   Component Value Date     11/19/2021    K 4.6 11/19/2021     11/19/2021    CO2 24 11/19/2021    BUN 29 (H) 11/19/2021    CREATININE 1.2 11/19/2021    GLU 84 11/19/2021    CALCIUM 10.3 11/19/2021    ALT 17 11/19/2021    AST 25 11/19/2021    ALKPHOS 72 11/19/2021    PROT 7.7 11/19/2021    ALBUMIN 4.0 11/19/2021       Other tests:  Lab Results   Component Value Date    TSH 1.764 11/19/2021    FREET4 1.20 11/19/2021    HGBA1C 5.1 11/19/2021    FERRITIN 30 11/19/2021    [id number] 1186 (H) 11/19/2021    [id number] 51 11/19/2021    MG 2.3 11/19/2021       Assessment and Plan:     1. Epigastric pain  Given that  recently diagnosed with h. Pylori, will check for that.  Try famotidine prn in meantime.    Pt is already scheduled for follow up 9/12 for annual and will reassess then.  - H. pylori antigen, stool; Future    2. Diastasis recti  Reassured that usually wouldn't need surgery for that.    Will schedule labs in preparation for upcoming appt in September.    3. Acquired hypothyroidism  - TSH; Future  - T4, Free; Future    4. Essential hypertension  - CBC Auto Differential; Future  - Comprehensive Metabolic Panel; Future  - Hemoglobin A1C; Future  - Lipid Panel; Future    RTC at scheduled appt 9/12       Neftaly Lebron MD  9/1/2022    This note was prepared using voice-recognition software.  Although efforts are made to proofread the note, some errors may persist in the final document.

## 2022-09-06 ENCOUNTER — LAB VISIT (OUTPATIENT)
Dept: LAB | Facility: HOSPITAL | Age: 72
End: 2022-09-06
Attending: INTERNAL MEDICINE
Payer: MEDICARE

## 2022-09-06 DIAGNOSIS — R10.13 EPIGASTRIC PAIN: ICD-10-CM

## 2022-09-06 PROCEDURE — 87338 HPYLORI STOOL AG IA: CPT | Performed by: INTERNAL MEDICINE

## 2022-09-09 ENCOUNTER — LAB VISIT (OUTPATIENT)
Dept: LAB | Facility: HOSPITAL | Age: 72
End: 2022-09-09
Attending: INTERNAL MEDICINE
Payer: MEDICARE

## 2022-09-09 DIAGNOSIS — E03.9 ACQUIRED HYPOTHYROIDISM: ICD-10-CM

## 2022-09-09 DIAGNOSIS — I10 ESSENTIAL HYPERTENSION: ICD-10-CM

## 2022-09-09 DIAGNOSIS — R73.9 HYPERGLYCEMIA, UNSPECIFIED: ICD-10-CM

## 2022-09-09 LAB
ALBUMIN SERPL BCP-MCNC: 3.7 G/DL (ref 3.5–5.2)
ALP SERPL-CCNC: 70 U/L (ref 55–135)
ALT SERPL W/O P-5'-P-CCNC: 16 U/L (ref 10–44)
ANION GAP SERPL CALC-SCNC: 8 MMOL/L (ref 8–16)
AST SERPL-CCNC: 19 U/L (ref 10–40)
BASOPHILS # BLD AUTO: 0.02 K/UL (ref 0–0.2)
BASOPHILS NFR BLD: 0.5 % (ref 0–1.9)
BILIRUB SERPL-MCNC: 0.6 MG/DL (ref 0.1–1)
BUN SERPL-MCNC: 20 MG/DL (ref 8–23)
CALCIUM SERPL-MCNC: 9.5 MG/DL (ref 8.7–10.5)
CHLORIDE SERPL-SCNC: 104 MMOL/L (ref 95–110)
CHOLEST SERPL-MCNC: 151 MG/DL (ref 120–199)
CHOLEST/HDLC SERPL: 3.4 {RATIO} (ref 2–5)
CO2 SERPL-SCNC: 25 MMOL/L (ref 23–29)
CREAT SERPL-MCNC: 1.3 MG/DL (ref 0.5–1.4)
DIFFERENTIAL METHOD: ABNORMAL
EOSINOPHIL # BLD AUTO: 0.2 K/UL (ref 0–0.5)
EOSINOPHIL NFR BLD: 4.2 % (ref 0–8)
ERYTHROCYTE [DISTWIDTH] IN BLOOD BY AUTOMATED COUNT: 13.4 % (ref 11.5–14.5)
EST. GFR  (NO RACE VARIABLE): 44.5 ML/MIN/1.73 M^2
ESTIMATED AVG GLUCOSE: 103 MG/DL (ref 68–131)
GLUCOSE SERPL-MCNC: 73 MG/DL (ref 70–110)
HBA1C MFR BLD: 5.2 % (ref 4–5.6)
HCT VFR BLD AUTO: 41.7 % (ref 37–48.5)
HDLC SERPL-MCNC: 44 MG/DL (ref 40–75)
HDLC SERPL: 29.1 % (ref 20–50)
HGB BLD-MCNC: 13.7 G/DL (ref 12–16)
IMM GRANULOCYTES # BLD AUTO: 0.03 K/UL (ref 0–0.04)
IMM GRANULOCYTES NFR BLD AUTO: 0.7 % (ref 0–0.5)
LDLC SERPL CALC-MCNC: 89.2 MG/DL (ref 63–159)
LYMPHOCYTES # BLD AUTO: 1.3 K/UL (ref 1–4.8)
LYMPHOCYTES NFR BLD: 30.8 % (ref 18–48)
MCH RBC QN AUTO: 28.2 PG (ref 27–31)
MCHC RBC AUTO-ENTMCNC: 32.9 G/DL (ref 32–36)
MCV RBC AUTO: 86 FL (ref 82–98)
MONOCYTES # BLD AUTO: 0.4 K/UL (ref 0.3–1)
MONOCYTES NFR BLD: 10.3 % (ref 4–15)
NEUTROPHILS # BLD AUTO: 2.2 K/UL (ref 1.8–7.7)
NEUTROPHILS NFR BLD: 53.5 % (ref 38–73)
NONHDLC SERPL-MCNC: 107 MG/DL
NRBC BLD-RTO: 0 /100 WBC
PLATELET # BLD AUTO: 181 K/UL (ref 150–450)
PMV BLD AUTO: 10.7 FL (ref 9.2–12.9)
POTASSIUM SERPL-SCNC: 4.6 MMOL/L (ref 3.5–5.1)
PROT SERPL-MCNC: 6.4 G/DL (ref 6–8.4)
RBC # BLD AUTO: 4.85 M/UL (ref 4–5.4)
SODIUM SERPL-SCNC: 137 MMOL/L (ref 136–145)
T4 FREE SERPL-MCNC: 1.08 NG/DL (ref 0.71–1.51)
TRIGL SERPL-MCNC: 89 MG/DL (ref 30–150)
TSH SERPL DL<=0.005 MIU/L-ACNC: 1.23 UIU/ML (ref 0.4–4)
WBC # BLD AUTO: 4.06 K/UL (ref 3.9–12.7)

## 2022-09-09 PROCEDURE — 36415 COLL VENOUS BLD VENIPUNCTURE: CPT | Mod: PN | Performed by: INTERNAL MEDICINE

## 2022-09-09 PROCEDURE — 84439 ASSAY OF FREE THYROXINE: CPT | Performed by: INTERNAL MEDICINE

## 2022-09-09 PROCEDURE — 85025 COMPLETE CBC W/AUTO DIFF WBC: CPT | Performed by: INTERNAL MEDICINE

## 2022-09-09 PROCEDURE — 80053 COMPREHEN METABOLIC PANEL: CPT | Performed by: INTERNAL MEDICINE

## 2022-09-09 PROCEDURE — 80061 LIPID PANEL: CPT | Performed by: INTERNAL MEDICINE

## 2022-09-09 PROCEDURE — 83036 HEMOGLOBIN GLYCOSYLATED A1C: CPT | Performed by: INTERNAL MEDICINE

## 2022-09-09 PROCEDURE — 84443 ASSAY THYROID STIM HORMONE: CPT | Performed by: INTERNAL MEDICINE

## 2022-09-12 ENCOUNTER — OFFICE VISIT (OUTPATIENT)
Dept: PRIMARY CARE CLINIC | Facility: CLINIC | Age: 72
End: 2022-09-12
Payer: MEDICARE

## 2022-09-12 VITALS
HEIGHT: 65 IN | SYSTOLIC BLOOD PRESSURE: 136 MMHG | BODY MASS INDEX: 36.1 KG/M2 | RESPIRATION RATE: 18 BRPM | OXYGEN SATURATION: 98 % | HEART RATE: 68 BPM | TEMPERATURE: 99 F | WEIGHT: 216.69 LBS | DIASTOLIC BLOOD PRESSURE: 78 MMHG

## 2022-09-12 DIAGNOSIS — E66.01 SEVERE OBESITY (BMI 35.0-39.9) WITH COMORBIDITY: ICD-10-CM

## 2022-09-12 DIAGNOSIS — N18.31 HYPERTENSIVE KIDNEY DISEASE WITH STAGE 3A CHRONIC KIDNEY DISEASE: ICD-10-CM

## 2022-09-12 DIAGNOSIS — N18.31 STAGE 3A CHRONIC KIDNEY DISEASE: ICD-10-CM

## 2022-09-12 DIAGNOSIS — E03.9 ACQUIRED HYPOTHYROIDISM: ICD-10-CM

## 2022-09-12 DIAGNOSIS — Z78.0 MENOPAUSE: ICD-10-CM

## 2022-09-12 DIAGNOSIS — M85.80 OSTEOPENIA, UNSPECIFIED LOCATION: Primary | ICD-10-CM

## 2022-09-12 DIAGNOSIS — I12.9 HYPERTENSIVE KIDNEY DISEASE WITH STAGE 3A CHRONIC KIDNEY DISEASE: ICD-10-CM

## 2022-09-12 PROCEDURE — 4010F ACE/ARB THERAPY RXD/TAKEN: CPT | Mod: CPTII,S$GLB,, | Performed by: INTERNAL MEDICINE

## 2022-09-12 PROCEDURE — 1101F PT FALLS ASSESS-DOCD LE1/YR: CPT | Mod: CPTII,S$GLB,, | Performed by: INTERNAL MEDICINE

## 2022-09-12 PROCEDURE — 3044F PR MOST RECENT HEMOGLOBIN A1C LEVEL <7.0%: ICD-10-PCS | Mod: CPTII,S$GLB,, | Performed by: INTERNAL MEDICINE

## 2022-09-12 PROCEDURE — 99999 PR PBB SHADOW E&M-EST. PATIENT-LVL IV: ICD-10-PCS | Mod: PBBFAC,,, | Performed by: INTERNAL MEDICINE

## 2022-09-12 PROCEDURE — 1126F PR PAIN SEVERITY QUANTIFIED, NO PAIN PRESENT: ICD-10-PCS | Mod: CPTII,S$GLB,, | Performed by: INTERNAL MEDICINE

## 2022-09-12 PROCEDURE — 1159F MED LIST DOCD IN RCRD: CPT | Mod: CPTII,S$GLB,, | Performed by: INTERNAL MEDICINE

## 2022-09-12 PROCEDURE — 1126F AMNT PAIN NOTED NONE PRSNT: CPT | Mod: CPTII,S$GLB,, | Performed by: INTERNAL MEDICINE

## 2022-09-12 PROCEDURE — 99214 OFFICE O/P EST MOD 30 MIN: CPT | Mod: S$GLB,,, | Performed by: INTERNAL MEDICINE

## 2022-09-12 PROCEDURE — 99214 PR OFFICE/OUTPT VISIT, EST, LEVL IV, 30-39 MIN: ICD-10-PCS | Mod: S$GLB,,, | Performed by: INTERNAL MEDICINE

## 2022-09-12 PROCEDURE — 1159F PR MEDICATION LIST DOCUMENTED IN MEDICAL RECORD: ICD-10-PCS | Mod: CPTII,S$GLB,, | Performed by: INTERNAL MEDICINE

## 2022-09-12 PROCEDURE — 3075F SYST BP GE 130 - 139MM HG: CPT | Mod: CPTII,S$GLB,, | Performed by: INTERNAL MEDICINE

## 2022-09-12 PROCEDURE — 3288F FALL RISK ASSESSMENT DOCD: CPT | Mod: CPTII,S$GLB,, | Performed by: INTERNAL MEDICINE

## 2022-09-12 PROCEDURE — 3288F PR FALLS RISK ASSESSMENT DOCUMENTED: ICD-10-PCS | Mod: CPTII,S$GLB,, | Performed by: INTERNAL MEDICINE

## 2022-09-12 PROCEDURE — 3078F DIAST BP <80 MM HG: CPT | Mod: CPTII,S$GLB,, | Performed by: INTERNAL MEDICINE

## 2022-09-12 PROCEDURE — 99999 PR PBB SHADOW E&M-EST. PATIENT-LVL IV: CPT | Mod: PBBFAC,,, | Performed by: INTERNAL MEDICINE

## 2022-09-12 PROCEDURE — 3078F PR MOST RECENT DIASTOLIC BLOOD PRESSURE < 80 MM HG: ICD-10-PCS | Mod: CPTII,S$GLB,, | Performed by: INTERNAL MEDICINE

## 2022-09-12 PROCEDURE — 3075F PR MOST RECENT SYSTOLIC BLOOD PRESS GE 130-139MM HG: ICD-10-PCS | Mod: CPTII,S$GLB,, | Performed by: INTERNAL MEDICINE

## 2022-09-12 PROCEDURE — 4010F PR ACE/ARB THEARPY RXD/TAKEN: ICD-10-PCS | Mod: CPTII,S$GLB,, | Performed by: INTERNAL MEDICINE

## 2022-09-12 PROCEDURE — 3008F PR BODY MASS INDEX (BMI) DOCUMENTED: ICD-10-PCS | Mod: CPTII,S$GLB,, | Performed by: INTERNAL MEDICINE

## 2022-09-12 PROCEDURE — 3044F HG A1C LEVEL LT 7.0%: CPT | Mod: CPTII,S$GLB,, | Performed by: INTERNAL MEDICINE

## 2022-09-12 PROCEDURE — 1101F PR PT FALLS ASSESS DOC 0-1 FALLS W/OUT INJ PAST YR: ICD-10-PCS | Mod: CPTII,S$GLB,, | Performed by: INTERNAL MEDICINE

## 2022-09-12 PROCEDURE — 3008F BODY MASS INDEX DOCD: CPT | Mod: CPTII,S$GLB,, | Performed by: INTERNAL MEDICINE

## 2022-09-12 NOTE — PROGRESS NOTES
[facility] Progress Note  9/12/2022          Reason for Visit:      had concerns including Establish Care.       History of Present Illness:     PT is here today to establish primary care.  WE saw her last week for abdominal pains.  Happily, that pain has since resolved after starting famotidine.  She says it is completely resolved at this point.  The H. Pylori test is still pending (her  had recently been treated for h. Pylori.).  We will follow up on the results of that.    Pt has hypothryoidism, the tsh and free t4 were normal on her recent labs.  She is taking levothyroxine 112 mcg daily.    She has osteopenia.  Takes calcium and a vitamin D supplement.  Most recnet Dexa scan was at [facility] about 2-3 years ago she thinks.  She is agreeable to repeat.    Her kidney function has been mildly reduced in the stage 3a range.  Blood pressure has been controlled on current meds (lisniopril 10).    Her last colonoscopy was a few years ago and she is going to let us know the name of the clinic so we can request the report.      She also is going to request her vaccine records from [facility] - she has received covid shots, shingrix and pneumococcal vaccines that we do not have documentation of.          Problem List:     Patient Active Problem List   Diagnosis    Essential hypertension    Acquired hypothyroidism    Osteopenia    History of Naga-en-Y gastric bypass    Severe obesity (BMI 35.0-39.9) with comorbidity    Stage 3a chronic kidney disease    Vitamin D deficiency         Surgical History:     She has a past surgical history that includes Gastric bypass and Eye surgery.      Family History:      Her family history includes Diabetes in her sister; Heart disease in her father and mother; Protein C deficiency in her sister; Protein S deficiency in her sister; Rectal cancer in her sister.      Social History:     She reports that she quit smoking about 42 years ago. Her smoking use included  "cigarettes. She has a 10.00 pack-year smoking history. She has never used smokeless tobacco. She reports that she does not drink alcohol and does not use drugs.      Medications:       Current Outpatient Medications:     calcium citrate-vitamin D3 315-200 mg (CITRACAL+D) 315 mg-5 mcg (200 unit) per tablet, Take 1 tablet by mouth 2 (two) times daily., Disp: , Rfl:     cyanocobalamin 1,000 mcg/mL injection, Inject 1 mL (1,000 mcg total) into the muscle every 30 days., Disp: 1 mL, Rfl: 11    famotidine (PEPCID) 20 MG tablet, Take 1 tablet (20 mg total) by mouth 2 (two) times daily., Disp: 60 tablet, Rfl: 3    levothyroxine (SYNTHROID) 112 MCG tablet, Take 1 tablet (112 mcg total) by mouth before breakfast., Disp: 90 tablet, Rfl: 3    lisinopriL 10 MG tablet, TAKE 1 TABLET BY MOUTH EVERY DAY, Disp: 90 tablet, Rfl: 1    multivitamin capsule, Take 1 capsule by mouth once daily., Disp: , Rfl:         Allergies:   She has No Known Allergies.      Review of Systems:     Review of Systems   Constitutional:  Negative for chills and fever.   HENT:  Negative for ear pain and sore throat.    Respiratory:  Negative for cough, shortness of breath and wheezing.    Cardiovascular:  Negative for chest pain and palpitations.   Gastrointestinal:  Negative for constipation, diarrhea, nausea and vomiting.   Genitourinary:  Negative for dysuria and hematuria.   Musculoskeletal:  Negative for joint swelling and joint deformity.   Neurological:  Negative for dizziness and weakness.         Physical Exam:     Temp:    98.6 °F (37 °C) (Oral)        Blood Pressure:  136/78        Pulse:   68     Respirations:  18  Weight:   98.3 kg (216 lb 11.4 oz)  Height:   5' 5" (1.651 m)  BMI:     Body mass index is 36.06 kg/m².    Physical Exam  Constitutional:       General: She is not in acute distress.  HENT:      Right Ear: Tympanic membrane normal.      Left Ear: Tympanic membrane normal.      Nose: No congestion or rhinorrhea.      Mouth/Throat:      " Pharynx: No oropharyngeal exudate or posterior oropharyngeal erythema.   Cardiovascular:      Rate and Rhythm: Normal rate and regular rhythm.   Pulmonary:      Effort: Pulmonary effort is normal. No respiratory distress.      Breath sounds: No wheezing.   Abdominal:      Palpations: Abdomen is soft.      Tenderness: There is no abdominal tenderness.   Skin:     General: Skin is warm.   Neurological:      General: No focal deficit present.      Mental Status: She is alert and oriented to person, place, and time.         Labs/Imaging/Testing:     Most recent CBC:  Lab Results   Component Value Date    WBC 4.06 09/09/2022    HGB 13.7 09/09/2022     09/09/2022    MCV 86 09/09/2022       Most recent Lipids:  Lab Results   Component Value Date    CHOL 151 09/09/2022    HDL 44 09/09/2022    LDLCALC 89.2 09/09/2022    TRIG 89 09/09/2022       Most recent Chemistry:  Lab Results   Component Value Date     09/09/2022    K 4.6 09/09/2022     09/09/2022    CO2 25 09/09/2022    BUN 20 09/09/2022    CREATININE 1.3 09/09/2022    GLU 73 09/09/2022    CALCIUM 9.5 09/09/2022    ALT 16 09/09/2022    AST 19 09/09/2022    ALKPHOS 70 09/09/2022    PROT 6.4 09/09/2022    ALBUMIN 3.7 09/09/2022       Other tests:  Lab Results   Component Value Date    TSH 1.229 09/09/2022    FREET4 1.08 09/09/2022    HGBA1C 5.2 09/09/2022    FERRITIN 30 11/19/2021    [id number] 1186 (H) 11/19/2021    MRKAOACO18VH 51 11/19/2021    MG 2.3 11/19/2021             Assessment and Plan:     1. Hypertensive kidney disease with stage 3a chronic kidney disease  2. Stage 3a chronic kidney disease  Continue lisinopril.  Bp well controlled.  Avoid nsaids     3. Osteopenia, unspecified location  - DXA Bone Density Spine And Hip; Future  Continue calicum and vitamin D supplement    4. Acquired hypothyroidism  Continue levothyroxine 112   Plan recheck labs in 6 mos    6. Severe obesity (BMI 35.0-39.9) with comorbidity  Work on low carb diet, weight  loss    RTC 6 mos or sooner predwin Lebron MD  9/12/2022    This note was prepared using voice-recognition software.  Although efforts are made to proofread the note, some errors may persist in the final document.

## 2022-09-13 LAB
H PYLORI AG STL QL IA: NOT DETECTED
SPECIMEN SOURCE: NORMAL

## 2022-09-27 ENCOUNTER — IMMUNIZATION (OUTPATIENT)
Dept: INTERNAL MEDICINE | Facility: CLINIC | Age: 72
End: 2022-09-27
Payer: MEDICARE

## 2022-09-27 ENCOUNTER — IMMUNIZATION (OUTPATIENT)
Dept: PHARMACY | Facility: CLINIC | Age: 72
End: 2022-09-27
Payer: MEDICARE

## 2022-09-27 DIAGNOSIS — Z23 NEED FOR VACCINATION: Primary | ICD-10-CM

## 2022-09-27 PROCEDURE — G0008 ADMIN INFLUENZA VIRUS VAC: HCPCS | Mod: PBBFAC

## 2022-10-18 ENCOUNTER — OFFICE VISIT (OUTPATIENT)
Dept: INTERNAL MEDICINE | Facility: CLINIC | Age: 72
End: 2022-10-18
Payer: MEDICARE

## 2022-10-18 VITALS
DIASTOLIC BLOOD PRESSURE: 86 MMHG | WEIGHT: 224.19 LBS | OXYGEN SATURATION: 99 % | BODY MASS INDEX: 37.35 KG/M2 | HEART RATE: 63 BPM | SYSTOLIC BLOOD PRESSURE: 134 MMHG | HEIGHT: 65 IN

## 2022-10-18 DIAGNOSIS — L81.9 HYPERPIGMENTATION: ICD-10-CM

## 2022-10-18 DIAGNOSIS — I87.2 CHRONIC VENOUS INSUFFICIENCY OF LOWER EXTREMITY: ICD-10-CM

## 2022-10-18 DIAGNOSIS — L03.115 CELLULITIS OF RIGHT LOWER EXTREMITY: Primary | ICD-10-CM

## 2022-10-18 PROCEDURE — 99999 PR PBB SHADOW E&M-EST. PATIENT-LVL IV: CPT | Mod: PBBFAC,,, | Performed by: NURSE PRACTITIONER

## 2022-10-18 PROCEDURE — 99214 OFFICE O/P EST MOD 30 MIN: CPT | Mod: PBBFAC | Performed by: NURSE PRACTITIONER

## 2022-10-18 PROCEDURE — 99214 OFFICE O/P EST MOD 30 MIN: CPT | Mod: S$PBB,,, | Performed by: NURSE PRACTITIONER

## 2022-10-18 PROCEDURE — 99999 PR PBB SHADOW E&M-EST. PATIENT-LVL IV: ICD-10-PCS | Mod: PBBFAC,,, | Performed by: NURSE PRACTITIONER

## 2022-10-18 PROCEDURE — 99214 PR OFFICE/OUTPT VISIT, EST, LEVL IV, 30-39 MIN: ICD-10-PCS | Mod: S$PBB,,, | Performed by: NURSE PRACTITIONER

## 2022-10-18 RX ORDER — DOXYCYCLINE 100 MG/1
100 CAPSULE ORAL EVERY 12 HOURS
Qty: 20 CAPSULE | Refills: 0 | Status: ON HOLD | OUTPATIENT
Start: 2022-10-18 | End: 2023-06-19 | Stop reason: HOSPADM

## 2022-10-18 NOTE — PROGRESS NOTES
INTERNAL MEDICINE CLINIC - SAME DAY APPOINTMENT  Progress Note    PRESENTING HISTORY     PCP: Neftaly Lebron MD    Chief Complaint/Reason for Visit:   No chief complaint on file.      History of Present Illness & ROS : Ms. Rosa Maria Vazquez is a 71 y.o. female.    Same day apt  New to me and practice site.   Very pleasant lady.   Reports that moved from [place], arrived to [facility] in about a year ago.   Has reportedly had extensive work up in [place] for this, always the right leg getting 'red'. No pain at rest. No recent travel.   Has reportedly had multiple US, never a clot found, and seen by a Vascular Doctor some years ago, but has not been seen since moving to [facility]. States, 'Was doing ok; but always know when it about to start up again, feel a ding'.   No fever or chills.   Also reports that was chronically on Keflex over the years and even had to do 'injections'.   Wore Tru Hose in the past, but have none now.   She is Nurse.     Review of Systems:  Eyes: denies visual changes at this time denies floaters   ENT: no nasal congestion or sore throat  Respiratory: no cough or shorness of breath  Cardiovascular: no chest pain or palpitations  Gastrointestinal: no nausea or vomiting, no abdominal pain or change in bowel habits  Genitourinary: no hematuria or dysuria; denies frequency  Hematologic/Lymphatic: no easy bruising or lymphadenopathy  Musculoskeletal: no arthralgias or myalgias  Neurological: no seizures or tremors  Endocrine: no heat or cold intolerance      PAST HISTORY:     Past Medical History:   Diagnosis Date    Hypertension     Thyroid disease        Past Surgical History:   Procedure Laterality Date    EYE SURGERY      GASTRIC BYPASS         Family History   Problem Relation Age of Onset    Heart disease Mother     Heart disease Father     Diabetes Sister     Protein C deficiency Sister     Protein S deficiency Sister     Rectal cancer Sister        Social History     Socioeconomic History     Marital status: Unknown   Tobacco Use    Smoking status: Former     Packs/day: 1.00     Years: 10.00     Pack years: 10.00     Types: Cigarettes     Quit date:      Years since quittin.8    Smokeless tobacco: Never   Substance and Sexual Activity    Alcohol use: Never    Drug use: Never    Sexual activity: Not Currently     Partners: Male     Comment:  (no interest)       MEDICATIONS & ALLERGIES:     Current Outpatient Medications on File Prior to Visit   Medication Sig Dispense Refill    calcium citrate-vitamin D3 315-200 mg (CITRACAL+D) 315 mg-5 mcg (200 unit) per tablet Take 1 tablet by mouth 2 (two) times daily.      cyanocobalamin 1,000 mcg/mL injection Inject 1 mL (1,000 mcg total) into the muscle every 30 days. 1 mL 11    famotidine (PEPCID) 20 MG tablet Take 1 tablet (20 mg total) by mouth 2 (two) times daily. 60 tablet 3    levothyroxine (SYNTHROID) 112 MCG tablet Take 1 tablet (112 mcg total) by mouth before breakfast. 90 tablet 3    lisinopriL 10 MG tablet TAKE 1 TABLET BY MOUTH EVERY DAY 90 tablet 1    multivitamin capsule Take 1 capsule by mouth once daily.       No current facility-administered medications on file prior to visit.        Review of patient's allergies indicates:  No Known Allergies    Medications Reconciliation:   I have reconciled the patient's home medications with the patient/family. I have updated all changes.  Refer to After-Visit Medication List.    OBJECTIVE:     Vital Signs:  There were no vitals filed for this visit.  Wt Readings from Last 3 Encounters:   22 1335 98.3 kg (216 lb 11.4 oz)   22 1405 98.7 kg (217 lb 9.5 oz)   21 0731 94.8 kg (209 lb)     There is no height or weight on file to calculate BMI.     Wt Readings from Last 3 Encounters:   10/18/22 101.7 kg (224 lb 3.3 oz)   22 98.3 kg (216 lb 11.4 oz)   22 98.7 kg (217 lb 9.5 oz)     Temp Readings from Last 3 Encounters:   22 98.6 °F (37 °C) (Oral)   22 98.3 °F  (36.8 °C) (Oral)   06/15/21 98.5 °F (36.9 °C) (Oral)     BP Readings from Last 3 Encounters:   10/18/22 134/86   09/12/22 136/78   09/01/22 124/62     Pulse Readings from Last 3 Encounters:   10/18/22 63   09/12/22 68   09/01/22 (!) 54         Physical Exam:  (Focused Exam)  General: Well developed, well nourished. No distress.  HEENT: Head is normocephalic, atraumatic  Eyes: Clear conjunctiva.  Neck: Supple, symmetrical neck; trachea midline.  Lungs: Clear to auscultation bilaterally and normal respiratory effort.  Extremities: Pulses 2+ and symmetric.   RLE: (see photo below)  LLE: unremarkable   Skin: Skin color, texture, turgor normal. No rashes.  RLE: (see photo below)  No vesicle formations appreciated on exam today.   Musculoskeletal: Normal gait.   Neurologic:  No focal numbness or weakness.       Laboratory  Lab Results   Component Value Date    WBC 4.06 09/09/2022    HGB 13.7 09/09/2022    HCT 41.7 09/09/2022     09/09/2022    CHOL 151 09/09/2022    TRIG 89 09/09/2022    HDL 44 09/09/2022    ALT 16 09/09/2022    AST 19 09/09/2022     09/09/2022    K 4.6 09/09/2022     09/09/2022    CREATININE 1.3 09/09/2022    BUN 20 09/09/2022    CO2 25 09/09/2022    TSH 1.229 09/09/2022    HGBA1C 5.2 09/09/2022         ASSESSMENT & PLAN:     Same day appt.       Baseline Creat: 1.3    Cellulitis of right lower extremity  -     Ambulatory referral/consult to Vascular Medicine; Future; Expected date: 10/25/2022  -     Cancel: COMPRESSION STOCKINGS  -     COMPRESSION STOCKINGS    Hyperpigmentation  -     Ambulatory referral/consult to Vascular Medicine; Future; Expected date: 10/25/2022  -     Cancel: COMPRESSION STOCKINGS  -     COMPRESSION STOCKINGS    Chronic venous insufficiency of lower extremity  -     COMPRESSION STOCKINGS    Other orders  -     doxycycline (VIBRAMYCIN) 100 MG Cap; Take 1 capsule (100 mg total) by mouth every 12 (twelve) hours.  Dispense: 20 capsule; Refill: 0      *RTC in 7-10  days with primary or this provider to re-evaluate. Understands that if not improving on the oral antibiotics, needs to report to the ER for further evaluation and need for IV abxs. Verbal understanding voiced.     *TDaP: up to date. 10/18/2018    Future Appointments   Date Time Provider Department Center   11/2/2022  9:00 AM [facility], DEXA1 [facility] [place]   2/28/2023  9:15 AM Neftaly Lebron MD [facility] [place]        Medication List            Accurate as of October 18, 2022  9:31 AM. If you have any questions, ask your nurse or doctor.                START taking these medications      doxycycline 100 MG Cap  Commonly known as: VIBRAMYCIN  Take 1 capsule (100 mg total) by mouth every 12 (twelve) hours.  Started by: EKTA Cheney            CONTINUE taking these medications      calcium citrate-vitamin D3 315-200 mg 315 mg-5 mcg (200 unit) per tablet  Commonly known as: CITRACAL+D     cyanocobalamin 1,000 mcg/mL injection  Inject 1 mL (1,000 mcg total) into the muscle every 30 days.     famotidine 20 MG tablet  Commonly known as: PEPCID  Take 1 tablet (20 mg total) by mouth 2 (two) times daily.     levothyroxine 112 MCG tablet  Commonly known as: SYNTHROID  Take 1 tablet (112 mcg total) by mouth before breakfast.     lisinopriL 10 MG tablet  TAKE 1 TABLET BY MOUTH EVERY DAY     multivitamin capsule               Where to Get Your Medications        These medications were sent to [facility]/pharmacy #[id number] - [place] - [address]  [address] [place] [address]      Phone: [phone]   doxycycline 100 MG Cap         Signing Physician:  EKTA Cheney

## 2022-10-26 ENCOUNTER — OFFICE VISIT (OUTPATIENT)
Dept: CARDIOLOGY | Facility: CLINIC | Age: 72
End: 2022-10-26
Payer: MEDICARE

## 2022-10-26 VITALS
WEIGHT: 214.31 LBS | DIASTOLIC BLOOD PRESSURE: 79 MMHG | BODY MASS INDEX: 35.71 KG/M2 | SYSTOLIC BLOOD PRESSURE: 137 MMHG | HEIGHT: 65 IN | HEART RATE: 52 BPM

## 2022-10-26 DIAGNOSIS — I87.2 VENOUS INSUFFICIENCY: ICD-10-CM

## 2022-10-26 DIAGNOSIS — L81.9 HYPERPIGMENTATION: ICD-10-CM

## 2022-10-26 DIAGNOSIS — L03.115 CELLULITIS OF RIGHT LOWER EXTREMITY: ICD-10-CM

## 2022-10-26 DIAGNOSIS — I10 ESSENTIAL HYPERTENSION: Primary | ICD-10-CM

## 2022-10-26 PROCEDURE — 99999 PR PBB SHADOW E&M-EST. PATIENT-LVL III: CPT | Mod: PBBFAC,,, | Performed by: INTERNAL MEDICINE

## 2022-10-26 PROCEDURE — 99204 PR OFFICE/OUTPT VISIT, NEW, LEVL IV, 45-59 MIN: ICD-10-PCS | Mod: S$PBB,,, | Performed by: INTERNAL MEDICINE

## 2022-10-26 PROCEDURE — 99213 OFFICE O/P EST LOW 20 MIN: CPT | Mod: PBBFAC,PO | Performed by: INTERNAL MEDICINE

## 2022-10-26 PROCEDURE — 99204 OFFICE O/P NEW MOD 45 MIN: CPT | Mod: S$PBB,,, | Performed by: INTERNAL MEDICINE

## 2022-10-26 PROCEDURE — 99999 PR PBB SHADOW E&M-EST. PATIENT-LVL III: ICD-10-PCS | Mod: PBBFAC,,, | Performed by: INTERNAL MEDICINE

## 2022-10-26 NOTE — PROGRESS NOTES
HISTORY:    71-year-old female with a history of hypertension, venous insufficiency, recurrent right lower extremity cellulitis, and hypothyroidism referred for evaluation.    The patient reports a chronic history of B LE edema for years along with chronic hyperpigmentation. No h/o VTE. Has a h/o recurrent cellulitits. Recently treated with doxy with resolution of symptoms.  Pt used to weigh over 400 pounds and had a significant improvement in LE symptoms when she lost weight around 20 years ago.    She currently uses compression stockings daily. Help significantly with symptoms.    She takes lisinopril for a h/o hypertension. The patient denies any previous history of myocardial infarction, coronary artery disease, peripheral arterial disease, stroke, congestive heart failure, or cardiomyopathy.    Patient recently moved here from [place].    MEDICATIONS:      Current Outpatient Medications:     calcium citrate-vitamin D3 315-200 mg (CITRACAL+D) 315 mg-5 mcg (200 unit) per tablet, Take 1 tablet by mouth 2 (two) times daily., Disp: , Rfl:     cyanocobalamin 1,000 mcg/mL injection, INJECT 1 ML (1,000 MCG TOTAL) INTO THE MUSCLE EVERY 30 DAYS., Disp: 3 mL, Rfl: 3    doxycycline (VIBRAMYCIN) 100 MG Cap, Take 1 capsule (100 mg total) by mouth every 12 (twelve) hours., Disp: 20 capsule, Rfl: 0    famotidine (PEPCID) 20 MG tablet, Take 1 tablet (20 mg total) by mouth 2 (two) times daily. (Patient taking differently: Take 20 mg by mouth Daily.), Disp: 60 tablet, Rfl: 3    levothyroxine (SYNTHROID) 112 MCG tablet, TAKE 1 TABLET BY MOUTH BEFORE BREAKFAST., Disp: 30 tablet, Rfl: 11    lisinopriL 10 MG tablet, TAKE 1 TABLET BY MOUTH EVERY DAY, Disp: 30 tablet, Rfl: 5    multivitamin capsule, Take 1 capsule by mouth once daily., Disp: , Rfl:       PHYSICAL EXAM:    Vitals:    10/26/22 1458   BP: 137/79   Pulse: (!) 52       NAD, A+Ox3.  No jvd, no bruit.  RRR nml s1,s2. No murmurs.  CTA B no wheezes or crackles.  2+ B  radial and 1+ B DP/PT. B LE edema mild with chronic lipodermatosclerosis L>R. No erythema or TTP. No open wounds.      LABS/STUDIES (imaging reviewed during clinic visit):    September 2022 hemoglobin 13.7.  Creatinine 1.3 with BUN of 20.  Albumin 3.7.  LDL 89 and HDL 44.  A1c 5.2.  TSH normal.  TTE 2021 normal LV size and function with EF 65%.  Grade 1 diastology.  Basal septal hypertrophy with mild [place] no significant obstruction.  CVP 3.      ASSESSMENT & PLAN:    1. Essential hypertension    2. Cellulitis of right lower extremity    3. Hyperpigmentation    4. Venous insufficiency        Orders Placed This Encounter    CV US Lower Extremity Veins Bilateral Insufficiency        Pt has C4b disease bilaterally. Will check a venous ultrasound. Continue compression stockings and lifestyle modifications. Recommend continued exercise and increase of walking distance.     Bps controlled on current meds.    Deedee Li MD

## 2022-12-01 ENCOUNTER — HOSPITAL ENCOUNTER (OUTPATIENT)
Dept: CARDIOLOGY | Facility: HOSPITAL | Age: 72
Discharge: HOME | End: 2022-12-01
Attending: INTERNAL MEDICINE
Payer: MEDICARE

## 2022-12-01 DIAGNOSIS — I87.2 VENOUS INSUFFICIENCY: ICD-10-CM

## 2022-12-01 PROCEDURE — 93970 EXTREMITY STUDY: CPT | Mod: TC

## 2022-12-01 PROCEDURE — 93970 CV US LOWER VENOUS INSUFFICIENCY BILATERAL (CUPID ONLY): ICD-10-PCS | Mod: 26,,, | Performed by: INTERNAL MEDICINE

## 2022-12-01 PROCEDURE — 93970 EXTREMITY STUDY: CPT | Mod: 26,,, | Performed by: INTERNAL MEDICINE

## 2022-12-02 LAB
LEFT GIAC DIA: 0.17 MM
LEFT GREAT SAPHENOUS DISTAL THIGH DIA: 0.39 CM
LEFT GREAT SAPHENOUS JUNCTION DIA: 0.77 CM
LEFT GREAT SAPHENOUS MIDDLE THIGH DIA: 0.39 CM
LEFT SMALL SAPHENOUS KNEE DIA: 0.17 CM
LEFT SMALL SAPHENOUS SPJ DIA: 0.17 CM
RIGHT GREAT SAPHENOUS DISTAL THIGH DIA: 0.43 CM
RIGHT GREAT SAPHENOUS JUNCTION DIA: 0.79 CM
RIGHT GREAT SAPHENOUS KNEE DIA: 0.27 CM
RIGHT GREAT SAPHENOUS KNEE REFLUX: 3556 MS
RIGHT GREAT SAPHENOUS MIDDLE THIGH DIA: 0.38 CM
RIGHT GREAT SAPHENOUS PROXIMAL CALF DIA: 0.2 CM
RIGHT GREAT SAPHENOUS PROXIMAL CALF REFLUX: 2392 MS
RIGHT SMALL SAPHENOUS KNEE DIA: 0.45 CM
RIGHT SMALL SAPHENOUS SPJ DIA: 0.24 CM
RIGHT SMALL SAPHENOUS SPJ REFLUX: 4401 MS

## 2022-12-08 ENCOUNTER — APPOINTMENT (OUTPATIENT)
Dept: RADIOLOGY | Facility: CLINIC | Age: 72
End: 2022-12-08
Attending: INTERNAL MEDICINE
Payer: MEDICARE

## 2022-12-08 DIAGNOSIS — M85.80 OSTEOPENIA, UNSPECIFIED LOCATION: ICD-10-CM

## 2022-12-08 DIAGNOSIS — Z78.0 MENOPAUSE: ICD-10-CM

## 2022-12-08 PROCEDURE — 77080 DEXA BONE DENSITY SPINE HIP: ICD-10-PCS | Mod: 26,,, | Performed by: INTERNAL MEDICINE

## 2022-12-08 PROCEDURE — 77080 DXA BONE DENSITY AXIAL: CPT | Mod: 26,,, | Performed by: INTERNAL MEDICINE

## 2022-12-08 PROCEDURE — 77080 DXA BONE DENSITY AXIAL: CPT | Mod: TC,PO

## 2022-12-16 ENCOUNTER — TELEPHONE (OUTPATIENT)
Dept: PRIMARY CARE CLINIC | Facility: CLINIC | Age: 72
End: 2022-12-16
Payer: MEDICARE

## 2022-12-16 PROBLEM — M81.0 AGE-RELATED OSTEOPOROSIS WITHOUT CURRENT PATHOLOGICAL FRACTURE: Status: ACTIVE | Noted: 2021-11-19

## 2022-12-16 RX ORDER — ALENDRONATE SODIUM 70 MG/1
70 TABLET ORAL
Qty: 4 TABLET | Refills: 11 | Status: SHIPPED | OUTPATIENT
Start: 2022-12-16 | End: 2023-05-29

## 2022-12-16 NOTE — TELEPHONE ENCOUNTER
----- Message from Neftaly Lebron MD sent at 12/16/2022  7:30 AM CST -----  The bone density has worsened and is now in the osteoporotic category.  I would recommend starting fosamax once weekly and continuing your calcium and vitamin D, and repeating the bone density in 2 years.

## 2023-01-23 ENCOUNTER — TELEPHONE (OUTPATIENT)
Dept: PRIMARY CARE CLINIC | Facility: CLINIC | Age: 73
End: 2023-01-23
Payer: MEDICARE

## 2023-01-23 DIAGNOSIS — Z12.39 ENCOUNTER FOR SCREENING FOR MALIGNANT NEOPLASM OF BREAST, UNSPECIFIED SCREENING MODALITY: Primary | ICD-10-CM

## 2023-01-23 DIAGNOSIS — Z12.31 ENCOUNTER FOR SCREENING MAMMOGRAM FOR MALIGNANT NEOPLASM OF BREAST: ICD-10-CM

## 2023-01-23 NOTE — TELEPHONE ENCOUNTER
----- Message from Anita Foley sent at 1/23/2023 10:24 AM CST -----  Contact: Pt  Pt. Is calling to orders put in for a mammo  Pt. Would like a call back when order is in.        Confirmed contact below:   Contact Name:Rosa Maria Vazquez  Phone Number: [phone]

## 2023-03-13 ENCOUNTER — HOSPITAL ENCOUNTER (OUTPATIENT)
Dept: RADIOLOGY | Facility: HOSPITAL | Age: 73
Discharge: HOME | End: 2023-03-13
Attending: INTERNAL MEDICINE
Payer: MEDICARE

## 2023-03-13 VITALS — BODY MASS INDEX: 36.11 KG/M2 | WEIGHT: 217 LBS

## 2023-03-13 DIAGNOSIS — Z12.39 ENCOUNTER FOR SCREENING FOR MALIGNANT NEOPLASM OF BREAST, UNSPECIFIED SCREENING MODALITY: ICD-10-CM

## 2023-03-13 DIAGNOSIS — Z12.31 ENCOUNTER FOR SCREENING MAMMOGRAM FOR MALIGNANT NEOPLASM OF BREAST: ICD-10-CM

## 2023-03-13 PROCEDURE — 77067 SCR MAMMO BI INCL CAD: CPT | Mod: 26,,, | Performed by: RADIOLOGY

## 2023-03-13 PROCEDURE — 77063 BREAST TOMOSYNTHESIS BI: CPT | Mod: 26,,, | Performed by: RADIOLOGY

## 2023-03-13 PROCEDURE — 77067 SCR MAMMO BI INCL CAD: CPT | Mod: TC

## 2023-03-13 PROCEDURE — 77063 MAMMO DIGITAL SCREENING BILAT WITH TOMO: ICD-10-PCS | Mod: 26,,, | Performed by: RADIOLOGY

## 2023-03-13 PROCEDURE — 77067 MAMMO DIGITAL SCREENING BILAT WITH TOMO: ICD-10-PCS | Mod: 26,,, | Performed by: RADIOLOGY

## 2023-05-08 ENCOUNTER — IMMUNIZATION (OUTPATIENT)
Dept: PHARMACY | Facility: CLINIC | Age: 73
End: 2023-05-08
Payer: MEDICARE

## 2023-05-08 DIAGNOSIS — Z23 NEED FOR VACCINATION: Primary | ICD-10-CM

## 2023-05-11 ENCOUNTER — OFFICE VISIT (OUTPATIENT)
Dept: CARDIOLOGY | Facility: CLINIC | Age: 73
End: 2023-05-11
Payer: MEDICARE

## 2023-05-11 VITALS
HEIGHT: 65 IN | SYSTOLIC BLOOD PRESSURE: 129 MMHG | RESPIRATION RATE: 20 BRPM | BODY MASS INDEX: 34.49 KG/M2 | WEIGHT: 207 LBS | HEART RATE: 56 BPM | DIASTOLIC BLOOD PRESSURE: 78 MMHG

## 2023-05-11 DIAGNOSIS — H53.132 SUDDEN VISUAL LOSS, LEFT EYE: ICD-10-CM

## 2023-05-11 DIAGNOSIS — I70.0 ATHEROSCLEROSIS OF AORTA: ICD-10-CM

## 2023-05-11 DIAGNOSIS — I48.20 CHRONIC ATRIAL FIBRILLATION: ICD-10-CM

## 2023-05-11 DIAGNOSIS — I87.2 VENOUS INSUFFICIENCY: Primary | ICD-10-CM

## 2023-05-11 DIAGNOSIS — E66.01 SEVERE OBESITY (BMI 35.0-39.9) WITH COMORBIDITY: ICD-10-CM

## 2023-05-11 DIAGNOSIS — I10 ESSENTIAL HYPERTENSION: ICD-10-CM

## 2023-05-11 PROCEDURE — 99215 PR OFFICE/OUTPT VISIT, EST, LEVL V, 40-54 MIN: ICD-10-PCS | Mod: S$PBB,,, | Performed by: INTERNAL MEDICINE

## 2023-05-11 PROCEDURE — 99215 OFFICE O/P EST HI 40 MIN: CPT | Mod: S$PBB,,, | Performed by: INTERNAL MEDICINE

## 2023-05-11 PROCEDURE — 99213 OFFICE O/P EST LOW 20 MIN: CPT | Mod: PBBFAC | Performed by: INTERNAL MEDICINE

## 2023-05-11 PROCEDURE — 93005 ELECTROCARDIOGRAM TRACING: CPT | Mod: PBBFAC | Performed by: INTERNAL MEDICINE

## 2023-05-11 PROCEDURE — 99999 PR PBB SHADOW E&M-EST. PATIENT-LVL III: CPT | Mod: PBBFAC,,, | Performed by: INTERNAL MEDICINE

## 2023-05-11 PROCEDURE — 93010 EKG 12-LEAD: ICD-10-PCS | Mod: S$PBB,,, | Performed by: INTERNAL MEDICINE

## 2023-05-11 PROCEDURE — 93010 ELECTROCARDIOGRAM REPORT: CPT | Mod: S$PBB,,, | Performed by: INTERNAL MEDICINE

## 2023-05-11 PROCEDURE — 99999 PR PBB SHADOW E&M-EST. PATIENT-LVL III: ICD-10-PCS | Mod: PBBFAC,,, | Performed by: INTERNAL MEDICINE

## 2023-05-11 RX ORDER — ROSUVASTATIN CALCIUM 5 MG/1
5 TABLET, COATED ORAL DAILY
Qty: 90 TABLET | Refills: 3 | Status: SHIPPED | OUTPATIENT
Start: 2023-05-11 | End: 2023-09-18 | Stop reason: SDUPTHER

## 2023-05-11 RX ORDER — ROSUVASTATIN CALCIUM 5 MG/1
5 TABLET, COATED ORAL DAILY
Qty: 90 TABLET | Refills: 3 | Status: SHIPPED | OUTPATIENT
Start: 2023-05-11 | End: 2023-05-11

## 2023-05-11 NOTE — ADDENDUM NOTE
Addended by: SHAWANDA CAMPBELL on: 5/11/2023 09:44 AM     Modules accepted: Orders, Level of Service

## 2023-05-11 NOTE — PROGRESS NOTES
HISTORY:    72-year-old female with a history of hypertension, venous insufficiency, recurrent right lower extremity cellulitis, hypothyroidism, obesity s/p gastric bypass '03 and intraocular migraines presenting for follow-up.    The patient reports a chronic history of B LE edema for years along with chronic hyperpigmentation. No h/o VTE. Has a h/o recurrent cellulitits. Pt used to weigh over 400 pounds and had a significant improvement in LE symptoms when she lost weight around 20 years ago.    She currently uses compression stockings daily. Help significantly with symptoms.    She takes lisinopril for a h/o hypertension. Bps controlled on home log. The patient denies any previous history of myocardial infarction, coronary artery disease, peripheral arterial disease, stroke, congestive heart failure, or cardiomyopathy.    Patient recently moved here from [place]. She is a retired ICU nurse. She moved here to be with her children and grandkids who are [employer] graduates.      PHYSICAL EXAM:    Vitals:    05/11/23 0905   BP: 129/78   Pulse: (!) 56   Resp: 20         NAD, A+Ox3.  No jvd, no bruit.  Irreg, irreg, nml s1,s2. No murmurs.  CTA B no wheezes or crackles.  2+ B radial and 1+ B DP/PT. B LE edema mild with chronic lipodermatosclerosis L>R. No erythema or TTP. No open wounds.      LABS/STUDIES (imaging reviewed during clinic visit):    September 2022 hemoglobin 13.7.  Creatinine 1.3 with BUN of 20.  Albumin 3.7.  LDL 89 and HDL 44.  A1c 5.2.  TSH normal.  EKG today atrial fibrillation. No Qs/Sts.   TTE 2021 normal LV size and function with EF 65%.  Grade 1 diastology.  Basal septal hypertrophy with mild [place] no significant obstruction.  CVP 3.  Venous duplex December 2022 shows no evidence of DVT bilaterally.  Bilateral GSV reflux is present.    ASSESSMENT & PLAN:    1. Venous insufficiency    2. Atherosclerosis of aorta    3. Severe obesity (BMI 35.0-39.9) with comorbidity    4. Essential hypertension     5. Sudden visual loss, left eye    6. Chronic atrial fibrillation          Orders Placed This Encounter    CV Ultrasound Bilateral Doppler Carotid    Holter monitor - 24 hour    EKG 12-lead    apixaban (ELIQUIS) 5 mg Tab    rosuvastatin (CRESTOR) 5 MG tablet        Incidental diagnosis of afib on ecg today. Unknown chronicity and irregularity not well appreciated on exam given low heart rates. CHADSVASC 4. Start apixiban 5x2. No AVN blockers needed. Check holter for occasional light headedness.     Aortic atherosclerosis noted. Will check carotid given left sided visual symptoms thought to be related to ocular migraines. AC started for afib. Can start rosuvastatin 5x1 for CV risk reduction.     Pt has C4b disease bilaterally. Continue compression stockings and lifestyle modifications. Recommend continued exercise and increase of walking distance.     Bps controlled on current meds.      Follow up in about 4 weeks (around 6/8/2023).      Deedee Li MD

## 2023-05-17 ENCOUNTER — PES CALL (OUTPATIENT)
Dept: ADMINISTRATIVE | Facility: CLINIC | Age: 73
End: 2023-05-17
Payer: MEDICARE

## 2023-05-18 ENCOUNTER — HOSPITAL ENCOUNTER (OUTPATIENT)
Dept: CARDIOLOGY | Facility: HOSPITAL | Age: 73
Discharge: HOME | End: 2023-05-18
Attending: INTERNAL MEDICINE
Payer: MEDICARE

## 2023-05-18 ENCOUNTER — PES CALL (OUTPATIENT)
Dept: ADMINISTRATIVE | Facility: CLINIC | Age: 73
End: 2023-05-18
Payer: MEDICARE

## 2023-05-18 DIAGNOSIS — I70.0 ATHEROSCLEROSIS OF AORTA: ICD-10-CM

## 2023-05-18 DIAGNOSIS — H53.132 SUDDEN VISUAL LOSS, LEFT EYE: ICD-10-CM

## 2023-05-18 LAB
LEFT ARM DIASTOLIC BLOOD PRESSURE: 80 MMHG
LEFT ARM SYSTOLIC BLOOD PRESSURE: 130 MMHG
LEFT CBA DIAS: 13 CM/S
LEFT CBA SYS: 46 CM/S
LEFT CCA DIST DIAS: 17 CM/S
LEFT CCA DIST SYS: 63 CM/S
LEFT CCA MID DIAS: 20 CM/S
LEFT CCA MID SYS: 76 CM/S
LEFT CCA PROX DIAS: 15 CM/S
LEFT CCA PROX SYS: 82 CM/S
LEFT ECA DIAS: 11 CM/S
LEFT ECA SYS: 58 CM/S
LEFT ICA DIST DIAS: 34 CM/S
LEFT ICA DIST SYS: 83 CM/S
LEFT ICA MID DIAS: 20 CM/S
LEFT ICA MID SYS: 57 CM/S
LEFT ICA PROX DIAS: 17 CM/S
LEFT ICA PROX SYS: 42 CM/S
LEFT VERTEBRAL DIAS: 12 CM/S
LEFT VERTEBRAL SYS: 38 CM/S
OHS CV CAROTID RIGHT ICA EDV HIGHEST: 24
OHS CV CAROTID ULTRASOUND LEFT ICA/CCA RATIO: 1.32
OHS CV CAROTID ULTRASOUND RIGHT ICA/CCA RATIO: 1.22
OHS CV PV CAROTID LEFT HIGHEST CCA: 82
OHS CV PV CAROTID LEFT HIGHEST ICA: 83
OHS CV PV CAROTID RIGHT HIGHEST CCA: 86
OHS CV PV CAROTID RIGHT HIGHEST ICA: 71
OHS CV US CAROTID LEFT HIGHEST EDV: 34
RIGHT ARM DIASTOLIC BLOOD PRESSURE: 86 MMHG
RIGHT ARM SYSTOLIC BLOOD PRESSURE: 136 MMHG
RIGHT CBA DIAS: 10 CM/S
RIGHT CBA SYS: 59 CM/S
RIGHT CCA DIST DIAS: 12 CM/S
RIGHT CCA DIST SYS: 58 CM/S
RIGHT CCA MID DIAS: 13 CM/S
RIGHT CCA MID SYS: 71 CM/S
RIGHT CCA PROX DIAS: 13 CM/S
RIGHT CCA PROX SYS: 86 CM/S
RIGHT ECA DIAS: 5 CM/S
RIGHT ECA SYS: 65 CM/S
RIGHT ICA DIST DIAS: 14 CM/S
RIGHT ICA DIST SYS: 56 CM/S
RIGHT ICA MID DIAS: 24 CM/S
RIGHT ICA MID SYS: 71 CM/S
RIGHT ICA PROX DIAS: 15 CM/S
RIGHT ICA PROX SYS: 51 CM/S
RIGHT VERTEBRAL DIAS: 8 CM/S
RIGHT VERTEBRAL SYS: 33 CM/S

## 2023-05-18 PROCEDURE — 93880 EXTRACRANIAL BILAT STUDY: CPT | Mod: 26,,, | Performed by: INTERNAL MEDICINE

## 2023-05-18 PROCEDURE — 93880 CV US DOPPLER CAROTID (CUPID ONLY): ICD-10-PCS | Mod: 26,,, | Performed by: INTERNAL MEDICINE

## 2023-05-18 PROCEDURE — 93880 EXTRACRANIAL BILAT STUDY: CPT

## 2023-05-19 ENCOUNTER — HOSPITAL ENCOUNTER (OUTPATIENT)
Dept: CARDIOLOGY | Facility: HOSPITAL | Age: 73
Discharge: HOME | End: 2023-05-19
Attending: INTERNAL MEDICINE
Payer: MEDICARE

## 2023-05-19 DIAGNOSIS — I48.20 CHRONIC ATRIAL FIBRILLATION: ICD-10-CM

## 2023-05-19 PROCEDURE — 93227 XTRNL ECG REC<48 HR R&I: CPT | Mod: ,,, | Performed by: INTERNAL MEDICINE

## 2023-05-19 PROCEDURE — 93227 HOLTER MONITOR - 24 HOUR (CUPID ONLY): ICD-10-PCS | Mod: ,,, | Performed by: INTERNAL MEDICINE

## 2023-05-19 PROCEDURE — 93226 XTRNL ECG REC<48 HR SCAN A/R: CPT

## 2023-05-21 PROBLEM — M85.80 OSTEOPENIA: Status: ACTIVE | Noted: 2023-05-21

## 2023-05-21 NOTE — PROGRESS NOTES
[facility] Progress Note  5/23/2023          Reason for Visit:      had concerns including Follow-up (Pt is following up on ).       History of Present Illness:     Pt is here today for a checkup    Afib/Aflutter  Eliquis 5 mg bid  Since last visit, patient saw cardiology and incidentally found to have afib with slow ventricular response.  Patient has been having dizziness/light-headedness.    Holter showed Average HR 44,  Going as low as 30 bpm when sleeping.  Plans is to place pacemaker and she is anxious to get that done as soon as possible    Hypothyroidism  Pt has hypothryoidism, the tsh and free t4 were normal on her labs in September.  She is taking levothyroxine 112 mcg daily.  She is agreeable to recheck labs     Osteoporosis   Alendronate 70mg  Dexa done 12/2022 showed T score -2.5.  Started alendronate then.  Takes calcium and a vitamin D supplement.    No side effects     CKD3a  Her kidney function has been mildly reduced in the stage 3a range.       HTN  Lisinopril 10 mg daily  Blood pressure has been controlled on current meds (lisniopril 10).      Aortic atherosclerosis  Crestor 5 mg   PT saw vascular surgery for venous insufficiency and they noted some plaque.  She is on crestor and tolerating.      Problem List:     Patient Active Problem List   Diagnosis    Essential hypertension    Acquired hypothyroidism    Age-related osteoporosis without current pathological fracture    History of [place]-en-Y gastric bypass    Severe obesity (BMI 35.0-39.9) with comorbidity    Stage 3a chronic kidney disease    Vitamin D deficiency    Venous insufficiency    Atherosclerosis of aorta    Chronic atrial fibrillation    Osteopenia         Medications:       Current Outpatient Medications:     alendronate (FOSAMAX) 70 MG tablet, Take 1 tablet (70 mg total) by mouth every 7 days., Disp: 4 tablet, Rfl: 11    apixaban (ELIQUIS) 5 mg Tab, Take 1 tablet (5 mg total) by mouth 2 (two) times daily.,  "Disp: 180 tablet, Rfl: 3    calcium citrate-vitamin D3 315-200 mg (CITRACAL+D) 315 mg-5 mcg (200 unit) per tablet, Take 1 tablet by mouth 2 (two) times daily., Disp: , Rfl:     cyanocobalamin 1,000 mcg/mL injection, INJECT 1 ML (1,000 MCG TOTAL) INTO THE MUSCLE EVERY 30 DAYS., Disp: 3 mL, Rfl: 3    doxycycline (VIBRAMYCIN) 100 MG Cap, Take 1 capsule (100 mg total) by mouth every 12 (twelve) hours., Disp: 20 capsule, Rfl: 0    EScitalopram oxalate (LEXAPRO) 10 MG tablet, Take 1 tablet by mouth once a day, Disp: 30 tablet, Rfl: 0    famotidine (PEPCID) 20 MG tablet, Take 1 tablet (20 mg total) by mouth 2 (two) times daily. (Patient taking differently: Take 20 mg by mouth Daily.), Disp: 60 tablet, Rfl: 3    levothyroxine (SYNTHROID) 112 MCG tablet, TAKE 1 TABLET BY MOUTH BEFORE BREAKFAST., Disp: 30 tablet, Rfl: 11    lisinopriL 10 MG tablet, TAKE 1 TABLET BY MOUTH EVERY DAY, Disp: 30 tablet, Rfl: 5    multivitamin capsule, Take 1 capsule by mouth once daily., Disp: , Rfl:     rosuvastatin (CRESTOR) 5 MG tablet, Take 1 tablet (5 mg total) by mouth once daily., Disp: 90 tablet, Rfl: 3        Review of Systems:     Review of Systems   Constitutional:  Positive for fatigue. Negative for chills and fever.   HENT:  Negative for ear pain and sore throat.    Respiratory:  Negative for cough, shortness of breath and wheezing.    Cardiovascular:  Negative for chest pain and palpitations.   Gastrointestinal:  Negative for constipation, diarrhea, nausea and vomiting.   Genitourinary:  Negative for dysuria and hematuria.   Musculoskeletal:  Negative for joint swelling and joint deformity.   Neurological:  Positive for light-headedness. Negative for dizziness and weakness.         Physical Exam:     Temp:             Blood Pressure:  128/68        Pulse:   (!) 53     Respirations:  18  Weight:   92.5 kg (203 lb 14.8 oz)  Height:   5' 5" (1.651 m)  BMI:     Body mass index is 33.93 kg/m².    Physical Exam  Constitutional:       " General: She is not in acute distress.  HENT:      Right Ear: Tympanic membrane normal.      Left Ear: Tympanic membrane normal.      Nose: No congestion or rhinorrhea.      Mouth/Throat:      Pharynx: No oropharyngeal exudate or posterior oropharyngeal erythema.   Cardiovascular:      Rate and Rhythm: Normal rate and regular rhythm.   Pulmonary:      Effort: Pulmonary effort is normal. No respiratory distress.      Breath sounds: No wheezing.   Abdominal:      Palpations: Abdomen is soft.      Tenderness: There is no abdominal tenderness.   Skin:     General: Skin is warm.   Neurological:      General: No focal deficit present.      Mental Status: She is alert and oriented to person, place, and time.         Labs/Imaging/Testing:     Most recent CBC:  Lab Results   Component Value Date    WBC 4.06 09/09/2022    HGB 13.7 09/09/2022     09/09/2022    MCV 86 09/09/2022       Most recent Lipids:  Lab Results   Component Value Date    CHOL 151 09/09/2022    HDL 44 09/09/2022    LDLCALC 89.2 09/09/2022    TRIG 89 09/09/2022       Most recent Chemistry:  Lab Results   Component Value Date     09/09/2022    K 4.6 09/09/2022     09/09/2022    CO2 25 09/09/2022    BUN 20 09/09/2022    CREATININE 1.3 09/09/2022    GLU 73 09/09/2022    CALCIUM 9.5 09/09/2022    ALT 16 09/09/2022    AST 19 09/09/2022    ALKPHOS 70 09/09/2022    PROT 6.4 09/09/2022    ALBUMIN 3.7 09/09/2022       Other tests:  Lab Results   Component Value Date    TSH 1.229 09/09/2022    FREET4 1.08 09/09/2022    HGBA1C 5.2 09/09/2022    FERRITIN 30 11/19/2021    SZLHQGDM90 1186 (H) 11/19/2021    DELLHWEB46VY 51 11/19/2021    MG 2.3 11/19/2021         Assessment and Plan:     1. Chronic atrial fibrillation  Plan is to place pacemaker bc of slow ventricular rate with symptoms.  Pt is hoping to get this done soon.    - CBC Auto Differential; Future  - Comprehensive Metabolic Panel; Future  - Lipid Panel; Future  - Hemoglobin A1C; Future  - TSH;  Future  - T4, Free; Future    2. Acquired hypothyroidism  Recheck Thryoid functions    - TSH; Future  - T4, Free; Future    3. Osteopenia, unspecified location  Continue alendronate  Plan repeat DEXA 12/2024    4. Stage 3a chronic kidney disease  Recheck CMP    - CBC Auto Differential; Future  - Comprehensive Metabolic Panel; Future  - Lipid Panel; Future  - Hemoglobin A1C; Future  - TSH; Future  - T4, Free; Future    5. Atherosclerosis of aorta  Stable, continue crestor    6. IFG (impaired fasting glucose)  - Hemoglobin A1C; Future    7. Hypertensive chronic kidney disease  Cotninue lisnopril 10 mg daily  Recheck CMP         No follow-ups on file.       Neftaly Lebron MD  5/23/2023    This note was prepared using voice-recognition software.  Although efforts are made to proofread the note, some errors may persist in the final document.

## 2023-05-22 ENCOUNTER — PATIENT MESSAGE (OUTPATIENT)
Dept: CARDIOLOGY | Facility: CLINIC | Age: 73
End: 2023-05-22
Payer: MEDICARE

## 2023-05-22 DIAGNOSIS — I48.20 CHRONIC ATRIAL FIBRILLATION: Primary | ICD-10-CM

## 2023-05-22 LAB
OHS CV EVENT MONITOR DAY: 0
OHS CV HOLTER LENGTH DECIMAL HOURS: 24
OHS CV HOLTER LENGTH HOURS: 24
OHS CV HOLTER LENGTH MINUTES: 0

## 2023-05-22 NOTE — TELEPHONE ENCOUNTER
Holter results discussed.  Newly recognized afib with low heart rates off of AVN blockers.   Patient endorses intermittent dizziness and weakness corresponding with heart artes in the 30s and 40s. No syncope.  Will have patient evaluated for PM implantation.

## 2023-05-23 ENCOUNTER — LAB VISIT (OUTPATIENT)
Dept: LAB | Facility: HOSPITAL | Age: 73
End: 2023-05-23
Attending: INTERNAL MEDICINE
Payer: MEDICARE

## 2023-05-23 ENCOUNTER — OFFICE VISIT (OUTPATIENT)
Dept: PRIMARY CARE CLINIC | Facility: CLINIC | Age: 73
End: 2023-05-23
Payer: MEDICARE

## 2023-05-23 VITALS
WEIGHT: 203.94 LBS | BODY MASS INDEX: 33.98 KG/M2 | OXYGEN SATURATION: 97 % | RESPIRATION RATE: 18 BRPM | HEART RATE: 53 BPM | SYSTOLIC BLOOD PRESSURE: 128 MMHG | HEIGHT: 65 IN | DIASTOLIC BLOOD PRESSURE: 68 MMHG

## 2023-05-23 DIAGNOSIS — I12.9 HYPERTENSIVE KIDNEY DISEASE WITH STAGE 3A CHRONIC KIDNEY DISEASE: ICD-10-CM

## 2023-05-23 DIAGNOSIS — N18.31 STAGE 3A CHRONIC KIDNEY DISEASE: ICD-10-CM

## 2023-05-23 DIAGNOSIS — I70.0 ATHEROSCLEROSIS OF AORTA: ICD-10-CM

## 2023-05-23 DIAGNOSIS — R73.01 IFG (IMPAIRED FASTING GLUCOSE): ICD-10-CM

## 2023-05-23 DIAGNOSIS — I48.20 CHRONIC ATRIAL FIBRILLATION: ICD-10-CM

## 2023-05-23 DIAGNOSIS — E03.9 ACQUIRED HYPOTHYROIDISM: ICD-10-CM

## 2023-05-23 DIAGNOSIS — N18.31 HYPERTENSIVE KIDNEY DISEASE WITH STAGE 3A CHRONIC KIDNEY DISEASE: ICD-10-CM

## 2023-05-23 DIAGNOSIS — I48.20 CHRONIC ATRIAL FIBRILLATION: Primary | ICD-10-CM

## 2023-05-23 DIAGNOSIS — M85.80 OSTEOPENIA, UNSPECIFIED LOCATION: ICD-10-CM

## 2023-05-23 LAB
ALBUMIN SERPL BCP-MCNC: 3.8 G/DL (ref 3.5–5.2)
ALP SERPL-CCNC: 66 U/L (ref 55–135)
ALT SERPL W/O P-5'-P-CCNC: 19 U/L (ref 10–44)
ANION GAP SERPL CALC-SCNC: 11 MMOL/L (ref 8–16)
AST SERPL-CCNC: 24 U/L (ref 10–40)
BASOPHILS # BLD AUTO: 0.01 K/UL (ref 0–0.2)
BASOPHILS NFR BLD: 0.2 % (ref 0–1.9)
BILIRUB SERPL-MCNC: 0.6 MG/DL (ref 0.1–1)
BUN SERPL-MCNC: 22 MG/DL (ref 8–23)
CALCIUM SERPL-MCNC: 9.7 MG/DL (ref 8.7–10.5)
CHLORIDE SERPL-SCNC: 107 MMOL/L (ref 95–110)
CHOLEST SERPL-MCNC: 121 MG/DL (ref 120–199)
CHOLEST/HDLC SERPL: 2.4 {RATIO} (ref 2–5)
CO2 SERPL-SCNC: 23 MMOL/L (ref 23–29)
CREAT SERPL-MCNC: 1.2 MG/DL (ref 0.5–1.4)
DIFFERENTIAL METHOD: ABNORMAL
EOSINOPHIL # BLD AUTO: 0.2 K/UL (ref 0–0.5)
EOSINOPHIL NFR BLD: 3.3 % (ref 0–8)
ERYTHROCYTE [DISTWIDTH] IN BLOOD BY AUTOMATED COUNT: 15.2 % (ref 11.5–14.5)
EST. GFR  (NO RACE VARIABLE): 48.7 ML/MIN/1.73 M^2
ESTIMATED AVG GLUCOSE: 111 MG/DL (ref 68–131)
GLUCOSE SERPL-MCNC: 73 MG/DL (ref 70–110)
HBA1C MFR BLD: 5.5 % (ref 4–5.6)
HCT VFR BLD AUTO: 42.8 % (ref 37–48.5)
HDLC SERPL-MCNC: 51 MG/DL (ref 40–75)
HDLC SERPL: 42.1 % (ref 20–50)
HGB BLD-MCNC: 13.6 G/DL (ref 12–16)
IMM GRANULOCYTES # BLD AUTO: 0.05 K/UL (ref 0–0.04)
IMM GRANULOCYTES NFR BLD AUTO: 1.1 % (ref 0–0.5)
LDLC SERPL CALC-MCNC: 59.2 MG/DL (ref 63–159)
LYMPHOCYTES # BLD AUTO: 1.3 K/UL (ref 1–4.8)
LYMPHOCYTES NFR BLD: 29.2 % (ref 18–48)
MCH RBC QN AUTO: 27.1 PG (ref 27–31)
MCHC RBC AUTO-ENTMCNC: 31.8 G/DL (ref 32–36)
MCV RBC AUTO: 85 FL (ref 82–98)
MONOCYTES # BLD AUTO: 0.3 K/UL (ref 0.3–1)
MONOCYTES NFR BLD: 7.6 % (ref 4–15)
NEUTROPHILS # BLD AUTO: 2.6 K/UL (ref 1.8–7.7)
NEUTROPHILS NFR BLD: 58.6 % (ref 38–73)
NONHDLC SERPL-MCNC: 70 MG/DL
NRBC BLD-RTO: 0 /100 WBC
PLATELET # BLD AUTO: 163 K/UL (ref 150–450)
PMV BLD AUTO: 11.9 FL (ref 9.2–12.9)
POTASSIUM SERPL-SCNC: 4.5 MMOL/L (ref 3.5–5.1)
PROT SERPL-MCNC: 7 G/DL (ref 6–8.4)
RBC # BLD AUTO: 5.02 M/UL (ref 4–5.4)
SODIUM SERPL-SCNC: 141 MMOL/L (ref 136–145)
T4 FREE SERPL-MCNC: 1.1 NG/DL (ref 0.71–1.51)
TRIGL SERPL-MCNC: 54 MG/DL (ref 30–150)
TSH SERPL DL<=0.005 MIU/L-ACNC: 1.01 UIU/ML (ref 0.4–4)
WBC # BLD AUTO: 4.49 K/UL (ref 3.9–12.7)

## 2023-05-23 PROCEDURE — 99999 PR PBB SHADOW E&M-EST. PATIENT-LVL III: CPT | Mod: PBBFAC,,, | Performed by: INTERNAL MEDICINE

## 2023-05-23 PROCEDURE — 80061 LIPID PANEL: CPT | Performed by: INTERNAL MEDICINE

## 2023-05-23 PROCEDURE — 36415 COLL VENOUS BLD VENIPUNCTURE: CPT | Mod: PN | Performed by: INTERNAL MEDICINE

## 2023-05-23 PROCEDURE — 84439 ASSAY OF FREE THYROXINE: CPT | Performed by: INTERNAL MEDICINE

## 2023-05-23 PROCEDURE — 80053 COMPREHEN METABOLIC PANEL: CPT | Performed by: INTERNAL MEDICINE

## 2023-05-23 PROCEDURE — 83036 HEMOGLOBIN GLYCOSYLATED A1C: CPT | Performed by: INTERNAL MEDICINE

## 2023-05-23 PROCEDURE — 99213 OFFICE O/P EST LOW 20 MIN: CPT | Mod: PBBFAC,PN | Performed by: INTERNAL MEDICINE

## 2023-05-23 PROCEDURE — 85025 COMPLETE CBC W/AUTO DIFF WBC: CPT | Performed by: INTERNAL MEDICINE

## 2023-05-23 PROCEDURE — 99999 PR PBB SHADOW E&M-EST. PATIENT-LVL III: ICD-10-PCS | Mod: PBBFAC,,, | Performed by: INTERNAL MEDICINE

## 2023-05-23 PROCEDURE — 84443 ASSAY THYROID STIM HORMONE: CPT | Performed by: INTERNAL MEDICINE

## 2023-05-23 PROCEDURE — 99214 PR OFFICE/OUTPT VISIT, EST, LEVL IV, 30-39 MIN: ICD-10-PCS | Mod: S$PBB,,, | Performed by: INTERNAL MEDICINE

## 2023-05-23 PROCEDURE — 99214 OFFICE O/P EST MOD 30 MIN: CPT | Mod: S$PBB,,, | Performed by: INTERNAL MEDICINE

## 2023-05-24 ENCOUNTER — TELEPHONE (OUTPATIENT)
Dept: ELECTROPHYSIOLOGY | Facility: CLINIC | Age: 73
End: 2023-05-24
Payer: MEDICARE

## 2023-05-24 NOTE — TELEPHONE ENCOUNTER
Called patient and offered her an appointment with Dr. Chance on 6/8 at 8am. Patient agreed to appointment. I also placed her on the wait list.

## 2023-05-24 NOTE — TELEPHONE ENCOUNTER
----- Message from Peng Warren sent at 5/24/2023  4:35 PM CDT -----  Regarding: Earlier Appt Needed  New pt would like an earlier appt.     Contact @ [phone]    Thanks

## 2023-05-29 RX ORDER — ALENDRONATE SODIUM 70 MG/1
70 TABLET ORAL
Qty: 4 TABLET | Refills: 11 | Status: SHIPPED | OUTPATIENT
Start: 2023-05-29 | End: 2023-12-21 | Stop reason: SDUPTHER

## 2023-06-07 PROBLEM — I48.91 ATRIAL FIBRILLATION WITH SLOW VENTRICULAR RESPONSE: Status: ACTIVE | Noted: 2023-06-07

## 2023-06-07 PROBLEM — R00.1 SYMPTOMATIC BRADYCARDIA: Status: ACTIVE | Noted: 2023-06-07

## 2023-06-07 PROBLEM — E66.01 SEVERE OBESITY (BMI 35.0-39.9) WITH COMORBIDITY: Status: RESOLVED | Noted: 2021-11-19 | Resolved: 2023-06-07

## 2023-06-07 PROBLEM — I48.19 PERSISTENT ATRIAL FIBRILLATION: Status: ACTIVE | Noted: 2023-05-11

## 2023-06-07 NOTE — PROGRESS NOTES
Subjective:   Patient ID:  Rosa Maria Vazquez is a 72 y.o. female who presents for evaluation of Atrial Fibrillation    Referring Cardiologist: Deedee Li MD  Primary Care Physician: Neftaly Lebron MD    HPI  I had the pleasure of seeing Mrs. Vazquez today in our electrophysiology clinic in consultation for her atrial fibrillation. As you are aware she is a pleasant 72 year-old woman with hypertension, obesity s/p gastric bypass, hypothyroidism, aortic atherosclerotic disease, and persistent AF. She moved to the [place] area a few years ago to be near with family. She established care with Dr. Li. AF was incidentally observed on a recent ECG during follow-up (controlled to bradycardic rates). She endorsed intermittent light-headedness. A holter monitor was ordered which showed persistent AF with ventricular rates at times in the 30s-50s corresponding to symptoms. Average ventricular rate was 44 bpm. She is no on any AV nikole blocking agents. She is referred for consideration for PPM implantation for symptomatic bradycardia. She notes fatigue x 2 months and light-headedness with an episode of near syncope.    An echocardiogram from 2021 noted preserved LV function with mild LA enlargement with impaired relaxation.    I reviewed available ECGs in Epic which are only from May of 2023 which show AF with controlled ventricular rates. QRS is narrow.    My interpretation of today's in clinic ECG is atrial fibrillation with SVR (ventricular rates 45 bpm)    Review of Systems   Constitutional: Negative for fever and malaise/fatigue.   HENT:  Negative for congestion and sore throat.    Eyes:  Negative for blurred vision and visual disturbance.   Cardiovascular:  Positive for dyspnea on exertion. Negative for chest pain, irregular heartbeat, near-syncope, palpitations and syncope.   Respiratory:  Positive for shortness of breath. Negative for cough.    Hematologic/Lymphatic: Negative for bleeding problem. Does not  bruise/bleed easily.   Skin: Negative.    Musculoskeletal: Negative.    Gastrointestinal:  Negative for bloating, abdominal pain, hematochezia and melena.   Neurological:  Positive for light-headedness. Negative for focal weakness and weakness.   Psychiatric/Behavioral: Negative.       Objective:   Physical Exam  Vitals reviewed.   Constitutional:       General: She is not in acute distress.     Appearance: She is well-developed. She is not diaphoretic.   HENT:      Head: Normocephalic and atraumatic.   Eyes:      General:         Right eye: No discharge.         Left eye: No discharge.      Conjunctiva/sclera: Conjunctivae normal.   Cardiovascular:      Rate and Rhythm: Bradycardia present. Rhythm irregularly irregular.      Heart sounds: No murmur heard.    No friction rub. No gallop.   Pulmonary:      Effort: Pulmonary effort is normal. No respiratory distress.      Breath sounds: Normal breath sounds. No wheezing or rales.   Abdominal:      General: Bowel sounds are normal. There is no distension.      Palpations: Abdomen is soft.      Tenderness: There is no abdominal tenderness.   Musculoskeletal:      Cervical back: Neck supple.   Skin:     General: Skin is warm and dry.   Neurological:      Mental Status: She is alert and oriented to person, place, and time.   Psychiatric:         Behavior: Behavior normal.         Thought Content: Thought content normal.         Judgment: Judgment normal.       Assessment:      1. Symptomatic bradycardia    2. Persistent atrial fibrillation    3. Atrial fibrillation with slow ventricular response    4. Atherosclerosis of aorta    5. Essential hypertension    6. Chronic atrial fibrillation        Plan:     In summary, Mrs. Vazquez is a pleasant 72 year-old woman with hypertension, obesity s/p gastric bypass, hypothyroidism, aortic atherosclerotic disease, and persistent AF with symptomatic bradycardia/slow ventricular response. Discussed PPM implantation followed by attempt  at rhythm control since AF is newly diagnosed. We discussed the alternatives, benefits and risks of the procedure including pain, infection, bleeding, injury to lung causing pneumothorax requiring tube placement, injury to heart valves, puncture of the heart leading to pericardial effusion or tamponade requiring tube drainage, heart attack, stroke and death. Discussed particulars of LBBPA region implant. She understood and desires to proceed. Consent signed.    Plan  dcPPM with LBBPA lead  MDT  Anesthesia  Hold eliquis 48 hours  Will try to schedule ASAP    Thank you for allowing me to participate in the care of this patient. Please do not hesitate to call me with any questions or concerns.    Zeke Chance MD, PhD  Cardiac Electrophysiology

## 2023-06-07 NOTE — H&P (VIEW-ONLY)
Subjective:   Patient ID:  Rosa Maria Vazquez is a 72 y.o. female who presents for evaluation of Atrial Fibrillation    Referring Cardiologist: Deedee Li MD  Primary Care Physician: Neftaly Lebron MD    HPI  I had the pleasure of seeing Mrs. Vazquez today in our electrophysiology clinic in consultation for her atrial fibrillation. As you are aware she is a pleasant 72 year-old woman with hypertension, obesity s/p gastric bypass, hypothyroidism, aortic atherosclerotic disease, and persistent AF. She moved to the [place] area a few years ago to be near with family. She established care with Dr. Li. AF was incidentally observed on a recent ECG during follow-up (controlled to bradycardic rates). She endorsed intermittent light-headedness. A holter monitor was ordered which showed persistent AF with ventricular rates at times in the 30s-50s corresponding to symptoms. Average ventricular rate was 44 bpm. She is no on any AV nikole blocking agents. She is referred for consideration for PPM implantation for symptomatic bradycardia. She notes fatigue x 2 months and light-headedness with an episode of near syncope.    An echocardiogram from 2021 noted preserved LV function with mild LA enlargement with impaired relaxation.    I reviewed available ECGs in Epic which are only from May of 2023 which show AF with controlled ventricular rates. QRS is narrow.    My interpretation of today's in clinic ECG is atrial fibrillation with SVR (ventricular rates 45 bpm)    Review of Systems   Constitutional: Negative for fever and malaise/fatigue.   HENT:  Negative for congestion and sore throat.    Eyes:  Negative for blurred vision and visual disturbance.   Cardiovascular:  Positive for dyspnea on exertion. Negative for chest pain, irregular heartbeat, near-syncope, palpitations and syncope.   Respiratory:  Positive for shortness of breath. Negative for cough.    Hematologic/Lymphatic: Negative for bleeding problem. Does not  bruise/bleed easily.   Skin: Negative.    Musculoskeletal: Negative.    Gastrointestinal:  Negative for bloating, abdominal pain, hematochezia and melena.   Neurological:  Positive for light-headedness. Negative for focal weakness and weakness.   Psychiatric/Behavioral: Negative.       Objective:   Physical Exam  Vitals reviewed.   Constitutional:       General: She is not in acute distress.     Appearance: She is well-developed. She is not diaphoretic.   HENT:      Head: Normocephalic and atraumatic.   Eyes:      General:         Right eye: No discharge.         Left eye: No discharge.      Conjunctiva/sclera: Conjunctivae normal.   Cardiovascular:      Rate and Rhythm: Bradycardia present. Rhythm irregularly irregular.      Heart sounds: No murmur heard.    No friction rub. No gallop.   Pulmonary:      Effort: Pulmonary effort is normal. No respiratory distress.      Breath sounds: Normal breath sounds. No wheezing or rales.   Abdominal:      General: Bowel sounds are normal. There is no distension.      Palpations: Abdomen is soft.      Tenderness: There is no abdominal tenderness.   Musculoskeletal:      Cervical back: Neck supple.   Skin:     General: Skin is warm and dry.   Neurological:      Mental Status: She is alert and oriented to person, place, and time.   Psychiatric:         Behavior: Behavior normal.         Thought Content: Thought content normal.         Judgment: Judgment normal.       Assessment:      1. Symptomatic bradycardia    2. Persistent atrial fibrillation    3. Atrial fibrillation with slow ventricular response    4. Atherosclerosis of aorta    5. Essential hypertension    6. Chronic atrial fibrillation        Plan:     In summary, Mrs. Vazquez is a pleasant 72 year-old woman with hypertension, obesity s/p gastric bypass, hypothyroidism, aortic atherosclerotic disease, and persistent AF with symptomatic bradycardia/slow ventricular response. Discussed PPM implantation followed by attempt  at rhythm control since AF is newly diagnosed. We discussed the alternatives, benefits and risks of the procedure including pain, infection, bleeding, injury to lung causing pneumothorax requiring tube placement, injury to heart valves, puncture of the heart leading to pericardial effusion or tamponade requiring tube drainage, heart attack, stroke and death. Discussed particulars of LBBPA region implant. She understood and desires to proceed. Consent signed.    Plan  dcPPM with LBBPA lead  MDT  Anesthesia  Hold eliquis 48 hours  Will try to schedule ASAP    Thank you for allowing me to participate in the care of this patient. Please do not hesitate to call me with any questions or concerns.    Zeke Chance MD, PhD  Cardiac Electrophysiology

## 2023-06-08 ENCOUNTER — OFFICE VISIT (OUTPATIENT)
Dept: CARDIOLOGY | Facility: CLINIC | Age: 73
End: 2023-06-08
Payer: MEDICARE

## 2023-06-08 ENCOUNTER — TELEPHONE (OUTPATIENT)
Dept: ELECTROPHYSIOLOGY | Facility: CLINIC | Age: 73
End: 2023-06-08

## 2023-06-08 ENCOUNTER — OFFICE VISIT (OUTPATIENT)
Dept: ELECTROPHYSIOLOGY | Facility: CLINIC | Age: 73
End: 2023-06-08
Payer: MEDICARE

## 2023-06-08 VITALS
RESPIRATION RATE: 20 BRPM | SYSTOLIC BLOOD PRESSURE: 143 MMHG | HEIGHT: 65 IN | OXYGEN SATURATION: 97 % | BODY MASS INDEX: 33.66 KG/M2 | WEIGHT: 202 LBS | DIASTOLIC BLOOD PRESSURE: 83 MMHG | HEART RATE: 51 BPM

## 2023-06-08 VITALS
SYSTOLIC BLOOD PRESSURE: 120 MMHG | HEIGHT: 65 IN | DIASTOLIC BLOOD PRESSURE: 62 MMHG | WEIGHT: 202.81 LBS | BODY MASS INDEX: 33.79 KG/M2 | HEART RATE: 45 BPM

## 2023-06-08 DIAGNOSIS — I70.0 ATHEROSCLEROSIS OF AORTA: Primary | ICD-10-CM

## 2023-06-08 DIAGNOSIS — I48.19 PERSISTENT ATRIAL FIBRILLATION: ICD-10-CM

## 2023-06-08 DIAGNOSIS — R00.1 SYMPTOMATIC BRADYCARDIA: Primary | ICD-10-CM

## 2023-06-08 DIAGNOSIS — I48.91 ATRIAL FIBRILLATION WITH SLOW VENTRICULAR RESPONSE: ICD-10-CM

## 2023-06-08 DIAGNOSIS — I48.20 CHRONIC ATRIAL FIBRILLATION: ICD-10-CM

## 2023-06-08 DIAGNOSIS — I87.2 VENOUS INSUFFICIENCY: ICD-10-CM

## 2023-06-08 DIAGNOSIS — I70.0 ATHEROSCLEROSIS OF AORTA: ICD-10-CM

## 2023-06-08 DIAGNOSIS — I10 ESSENTIAL HYPERTENSION: ICD-10-CM

## 2023-06-08 DIAGNOSIS — R00.1 SYMPTOMATIC BRADYCARDIA: ICD-10-CM

## 2023-06-08 PROCEDURE — 99999 PR PBB SHADOW E&M-EST. PATIENT-LVL III: CPT | Mod: PBBFAC,,, | Performed by: INTERNAL MEDICINE

## 2023-06-08 PROCEDURE — 93010 ELECTROCARDIOGRAM REPORT: CPT | Mod: S$PBB,,, | Performed by: INTERNAL MEDICINE

## 2023-06-08 PROCEDURE — 99999 PR PBB SHADOW E&M-EST. PATIENT-LVL III: ICD-10-PCS | Mod: PBBFAC,,, | Performed by: INTERNAL MEDICINE

## 2023-06-08 PROCEDURE — 99213 OFFICE O/P EST LOW 20 MIN: CPT | Mod: PBBFAC | Performed by: INTERNAL MEDICINE

## 2023-06-08 PROCEDURE — 99205 PR OFFICE/OUTPT VISIT, NEW, LEVL V, 60-74 MIN: ICD-10-PCS | Mod: S$PBB,,, | Performed by: INTERNAL MEDICINE

## 2023-06-08 PROCEDURE — 99214 PR OFFICE/OUTPT VISIT, EST, LEVL IV, 30-39 MIN: ICD-10-PCS | Mod: S$PBB,,, | Performed by: INTERNAL MEDICINE

## 2023-06-08 PROCEDURE — 99214 OFFICE O/P EST MOD 30 MIN: CPT | Mod: S$PBB,,, | Performed by: INTERNAL MEDICINE

## 2023-06-08 PROCEDURE — 99205 OFFICE O/P NEW HI 60 MIN: CPT | Mod: S$PBB,,, | Performed by: INTERNAL MEDICINE

## 2023-06-08 PROCEDURE — 99213 OFFICE O/P EST LOW 20 MIN: CPT | Mod: PBBFAC,27 | Performed by: INTERNAL MEDICINE

## 2023-06-08 PROCEDURE — 93010 RHYTHM STRIP: ICD-10-PCS | Mod: S$PBB,,, | Performed by: INTERNAL MEDICINE

## 2023-06-08 PROCEDURE — 93005 ELECTROCARDIOGRAM TRACING: CPT | Mod: PBBFAC | Performed by: INTERNAL MEDICINE

## 2023-06-08 NOTE — PROGRESS NOTES
HISTORY:    72-year-old female with a history of atrial fibrillation, hypertension, venous insufficiency, recurrent right lower extremity cellulitis, hypothyroidism, obesity s/p gastric bypass '03 and intraocular migraines presenting for follow-up.    Atrial fibrillation incidentally found on ecg and then on further questioning and holter monitor appeared to have symptomatic bradycardia. She is tolerating apixiban 5x2 without issue. She has visited with Dr. Chance and PM implantation is planned for June 19th.     The patient was initially evaluated for a chronic history of B LE edema for years along with chronic hyperpigmentation. No h/o VTE. Has a h/o recurrent cellulitits. Pt used to weigh over 400 pounds and had a significant improvement in LE symptoms when she lost weight around 20 years ago.    She currently uses compression stockings daily. Help significantly with symptoms.    She takes lisinopril for a h/o hypertension. Bps controlled on home log. The patient denies any previous history of myocardial infarction, coronary artery disease, peripheral arterial disease, stroke, congestive heart failure, or cardiomyopathy.    Patient moved here in '22 from [place]. She is a retired ICU nurse. She moved here to be with her children and grandkids who are [employer] graduates.      PHYSICAL EXAM:    Vitals:    06/08/23 1058   BP: (!) 143/83   Pulse: (!) 51   Resp: 20           NAD, A+Ox3.  No jvd, no bruit.  Irreg, irreg, nml s1,s2. No murmurs.  CTA B no wheezes or crackles.  2+ B radial and 1+ B DP/PT. B LE edema mild with chronic lipodermatosclerosis L>R. No erythema or TTP. No open wounds.      LABS/STUDIES (imaging reviewed during clinic visit):    May 2023 hemoglobin 13.6 with MCV of 85.  Creatinine 1.2 with a BUN 22.  Albumin 3.8.  LDL 59 and HDL 51.  Triglycerides 54.  A1c and TSH normal.  September 2022 hemoglobin 13.7.  Creatinine 1.3 with BUN of 20.  Albumin 3.7.  LDL 89 and HDL 44.  A1c 5.2.  TSH  normal.  EKG May 2023 atrial fibrillation. No Qs/Sts.   Holter May 2023 atrial fibrillation with average heart rate of 44 beats per minute.  Symptomatic episodes corresponding with heart rates in the range of 35-55.  Slowest ventricular response was 30 beats per minute and the longest pause was 3 seconds, none of which corresponded to symptoms.  TTE 2021 normal LV size and function with EF 65%.  Grade 1 diastology.  Basal septal hypertrophy with mild Marvel no significant obstruction.  CVP 3.  Venous duplex December 2022 shows no evidence of DVT bilaterally.  Bilateral GSV reflux is present. L GSV SVT.   Carotid May 2023 No significant ICA ds bilaterally.     ASSESSMENT & PLAN:    1. Atherosclerosis of aorta    2. Atrial fibrillation with slow ventricular response    3. Essential hypertension    4. Symptomatic bradycardia    5. Venous insufficiency                    Incidental diagnosis of afib likely long standing. CHADSVASC 4 and tolerating apixiban 5x2.     Symptomatic bradycardia off AVN blockers. Evaluated by Dr. Chance and plan for PM implantation.     Aortic atherosclerosis noted and tolerating rosuvastatin 5x1 for CV risk reduction.     Pt has C4b venous insufficiency bilaterally. Continue compression stockings and lifestyle modifications. Recommend continued exercise and increase of walking distance. May improve w PM implantation.    Bps acceptable on lisinopril 10x1.      Follow up in about 4 months (around 10/8/2023).      Deedee Li MD

## 2023-06-08 NOTE — TELEPHONE ENCOUNTER
Spoke with patient and scheduled her procedure (PPM for Dr Chance) for 6/19/2023 (per DR Chance). Procedure details reviewed and instructions will be sent via mail and email as requested. Will use labs from 5/23/2023 and get coags on admit.

## 2023-06-09 ENCOUNTER — OFFICE VISIT (OUTPATIENT)
Dept: URGENT CARE | Facility: CLINIC | Age: 73
End: 2023-06-09
Payer: MEDICARE

## 2023-06-09 VITALS
RESPIRATION RATE: 20 BRPM | BODY MASS INDEX: 33.66 KG/M2 | WEIGHT: 202 LBS | TEMPERATURE: 98 F | DIASTOLIC BLOOD PRESSURE: 78 MMHG | SYSTOLIC BLOOD PRESSURE: 118 MMHG | HEIGHT: 65 IN | OXYGEN SATURATION: 97 % | HEART RATE: 50 BPM

## 2023-06-09 DIAGNOSIS — I48.19 PERSISTENT ATRIAL FIBRILLATION: Primary | ICD-10-CM

## 2023-06-09 DIAGNOSIS — H00.025 HORDEOLUM INTERNUM OF LEFT LOWER EYELID: Primary | ICD-10-CM

## 2023-06-09 PROCEDURE — 99213 OFFICE O/P EST LOW 20 MIN: CPT | Mod: S$GLB,,,

## 2023-06-09 PROCEDURE — 99213 PR OFFICE/OUTPT VISIT, EST, LEVL III, 20-29 MIN: ICD-10-PCS | Mod: S$GLB,,,

## 2023-06-09 RX ORDER — ERYTHROMYCIN 5 MG/G
OINTMENT OPHTHALMIC EVERY 4 HOURS
Qty: 3.5 G | Refills: 0 | Status: ON HOLD | OUTPATIENT
Start: 2023-06-09 | End: 2023-06-19 | Stop reason: HOSPADM

## 2023-06-09 RX ORDER — ERYTHROMYCIN 5 MG/G
OINTMENT OPHTHALMIC EVERY 4 HOURS
Qty: 3.5 G | Refills: 0 | Status: SHIPPED | OUTPATIENT
Start: 2023-06-09 | End: 2023-06-09

## 2023-06-09 NOTE — PROGRESS NOTES
"Subjective:      Patient ID: Rosa Maria Vazquez is a 72 y.o. female.    Vitals:  height is 5' 5" (1.651 m) and weight is 91.6 kg (202 lb). Her temperature is 97.7 °F (36.5 °C). Her blood pressure is 118/78 and her pulse is 50 (abnormal). Her respiration is 20 and oxygen saturation is 97%.     Chief Complaint: Eye Problem    This is a 72 y.o. female   who presents today with a chief complaint of aleft eye problem that began a day ago. She's complaining of burning and redness. She hasn't taken any medication to help relieve her symptoms. She has noticed discharge coming from the eyes. Denies contact lens use.     Eye Problem   The left eye is affected. This is a new problem. The current episode started yesterday. The problem occurs constantly. The problem has been gradually worsening. There was no injury mechanism. The pain is at a severity of 5/10. The pain is moderate. There is No known exposure to pink eye. She Does not wear contacts. Associated symptoms include an eye discharge and eye redness. Pertinent negatives include no blurred vision, double vision, fever, foreign body sensation, itching, nausea, photophobia, recent URI or vomiting. She has tried nothing for the symptoms.     Constitution: Negative for fever.   Eyes:  Positive for eye discharge, eye pain, eye redness and eyelid swelling. Negative for eye itching, photophobia, vision loss, double vision and blurred vision.   Gastrointestinal:  Negative for nausea and vomiting.    Objective:     Physical Exam   Constitutional: She is oriented to person, place, and time. She appears well-developed.      Comments:Patient sits comfortably in exam chair. Answers questions in complete sentences. Does not show any signs of distress or discoloration.        HENT:   Head: Normocephalic and atraumatic.   Ears:   Right Ear: External ear normal.   Left Ear: External ear normal.   Nose: Nose normal.   Mouth/Throat: Oropharynx is clear and moist.   Eyes: Conjunctivae, EOM and " lids are normal. Pupils are equal, round, and reactive to light. Lids are everted and swept, no foreign bodies found. Left eye exhibits discharge, exudate and hordeolum. Left eye exhibits no chemosis. No foreign body present in the left eye. Left conjunctiva is not injected. Left conjunctiva has no hemorrhage. No scleral icterus. Left eye exhibits normal extraocular motion and no nystagmus.     vision grossly intact gaze aligned appropriately periorbital hyperpigmentation     Comments: PERRLA. EOMI. There is a hordeolum noted to the inside of the left lower eyelid. There is tenderness to palpation over the left lower eyelid. Mild swelling.    Neck: Trachea normal and phonation normal. Neck supple.   Musculoskeletal: Normal range of motion.         General: Normal range of motion.   Neurological: She is alert and oriented to person, place, and time.   Skin: Skin is warm, dry and intact.   Psychiatric: Her speech is normal and behavior is normal. Judgment and thought content normal.   Nursing note and vitals reviewed.  Vision Screening    Right eye Left eye Both eyes   Without correction      With correction 20/25 20/30 20/25       Assessment:     1. Hordeolum internum of left lower eyelid        Plan:       Hordeolum internum of left lower eyelid  -     Discontinue: erythromycin (ROMYCIN) ophthalmic ointment; Place into the left eye every 4 (four) hours. for 10 days  Dispense: 3.5 g; Refill: 0  -     erythromycin (ROMYCIN) ophthalmic ointment; Place into the left eye every 4 (four) hours. for 10 days  Dispense: 3.5 g; Refill: 0                  Patient Instructions   - Apply erythromycin ointment to the inside of the left lower eyelid with clean hands.    - Warm compresses to the eye for 10 minutes at a time 3-5 times per day.     - You must understand that you have received an Urgent Care treatment only and that you may be released before all of your medical problems are known or treated.   - You, the patient, will  arrange for follow up care as instructed.   - If your condition worsens or fails to improve we recommend that you receive another evaluation at the ER immediately or contact your PCP to discuss your concerns or return here.   - Follow up with your PCP or specialty clinic as directed in the next 1-2 weeks if not improved or as needed.  You can call [phone] to schedule an appointment with the appropriate provider.    If your symptoms do not improve or worsen, go to the emergency room immediately.

## 2023-06-09 NOTE — TELEPHONE ENCOUNTER
Refill Routing Note   Medication(s) are not appropriate for processing by [facility] for the following reason(s):      New or recently adjusted medication    [facility] action(s):  Defer None identified            Appointments  past 12m or future 3m with PCP    Date Provider   Last Visit   5/23/2023 Neftaly Lebron MD   Next Visit   8/24/2023 Neftaly Lebron MD   ED visits in past 90 days: 0        Note composed:10:51 AM 06/09/2023

## 2023-06-09 NOTE — TELEPHONE ENCOUNTER
No care due was identified.  [facility] Embedded Care Due Messages. Reference number: [id number].   6/09/2023 10:13:24 AM CDT

## 2023-06-09 NOTE — PATIENT INSTRUCTIONS
- Apply erythromycin ointment to the inside of the left lower eyelid with clean hands.    - Warm compresses to the eye for 10 minutes at a time 3-5 times per day.     - You must understand that you have received an Urgent Care treatment only and that you may be released before all of your medical problems are known or treated.   - You, the patient, will arrange for follow up care as instructed.   - If your condition worsens or fails to improve we recommend that you receive another evaluation at the ER immediately or contact your PCP to discuss your concerns or return here.   - Follow up with your PCP or specialty clinic as directed in the next 1-2 weeks if not improved or as needed.  You can call [phone] to schedule an appointment with the appropriate provider.    If your symptoms do not improve or worsen, go to the emergency room immediately.

## 2023-06-12 RX ORDER — LISINOPRIL 10 MG/1
TABLET ORAL
Qty: 90 TABLET | Refills: 3 | Status: SHIPPED | OUTPATIENT
Start: 2023-06-12 | End: 2024-03-20 | Stop reason: SDUPTHER

## 2023-06-16 ENCOUNTER — TELEPHONE (OUTPATIENT)
Dept: ELECTROPHYSIOLOGY | Facility: CLINIC | Age: 73
End: 2023-06-16
Payer: MEDICARE

## 2023-06-16 NOTE — TELEPHONE ENCOUNTER
Spoke to Patient.    CONFIRMED procedure arrival time of 8 am on 6/19/2023 for dcPPM implant with Dr Chance.    Reiterated instructions including:    -Directions to check in desk.    -NPO after midnight night prior to procedure. Fasting upon arrival.    -High importance of HOLDING Eliquis x 2 days prior to the procedure, last dose to be taken tonight.      -Pre-procedure LABS reviewed with no alerts noted. Coags on admit.    -Confirmed absence or presence of implanted device/stimulator  -n/a    -Confirmed no recent or current fever, cough, or shortness of breath .    -Confirmed no redness, rash, irritation, or yeast infection to chest area .    -Bathe night prior and morning prior to procedure with Hibiclens solution or an antibacterial soap.    -Reviewed current visitor policy    Patient verbalized understanding of above, denies any further questions and appreciated the call.

## 2023-06-19 ENCOUNTER — ANESTHESIA (OUTPATIENT)
Dept: MEDSURG UNIT | Facility: HOSPITAL | Age: 73
End: 2023-06-19
Payer: MEDICARE

## 2023-06-19 ENCOUNTER — ANESTHESIA EVENT (OUTPATIENT)
Dept: MEDSURG UNIT | Facility: HOSPITAL | Age: 73
End: 2023-06-19
Payer: MEDICARE

## 2023-06-19 ENCOUNTER — HOSPITAL ENCOUNTER (OUTPATIENT)
Facility: HOSPITAL | Age: 73
Discharge: HOME | End: 2023-06-19
Attending: INTERNAL MEDICINE | Admitting: INTERNAL MEDICINE
Payer: MEDICARE

## 2023-06-19 VITALS
TEMPERATURE: 98 F | HEIGHT: 64 IN | OXYGEN SATURATION: 98 % | DIASTOLIC BLOOD PRESSURE: 73 MMHG | BODY MASS INDEX: 34.49 KG/M2 | RESPIRATION RATE: 18 BRPM | SYSTOLIC BLOOD PRESSURE: 157 MMHG | HEART RATE: 60 BPM | WEIGHT: 202 LBS

## 2023-06-19 DIAGNOSIS — I49.9 ARRHYTHMIA: ICD-10-CM

## 2023-06-19 DIAGNOSIS — R00.1 SYMPTOMATIC BRADYCARDIA: ICD-10-CM

## 2023-06-19 DIAGNOSIS — Z95.9 CARDIAC DEVICE IN SITU: ICD-10-CM

## 2023-06-19 DIAGNOSIS — I48.91 ATRIAL FIBRILLATION WITH SLOW VENTRICULAR RESPONSE: ICD-10-CM

## 2023-06-19 LAB
APTT PPP: 24.1 SEC (ref 21–32)
INR PPP: 1 (ref 0.8–1.2)
PROTHROMBIN TIME: 10.6 SEC (ref 9–12.5)

## 2023-06-19 PROCEDURE — 63600175 PHARM REV CODE 636 W HCPCS: Performed by: NURSE PRACTITIONER

## 2023-06-19 PROCEDURE — 93005 ELECTROCARDIOGRAM TRACING: CPT

## 2023-06-19 PROCEDURE — D9220A PRA ANESTHESIA: ICD-10-PCS | Mod: CRNA,,, | Performed by: NURSE ANESTHETIST, CERTIFIED REGISTERED

## 2023-06-19 PROCEDURE — 25000003 PHARM REV CODE 250: Performed by: NURSE PRACTITIONER

## 2023-06-19 PROCEDURE — 25500020 PHARM REV CODE 255: Performed by: NURSE ANESTHETIST, CERTIFIED REGISTERED

## 2023-06-19 PROCEDURE — 85610 PROTHROMBIN TIME: CPT | Performed by: INTERNAL MEDICINE

## 2023-06-19 PROCEDURE — 93010 EKG 12-LEAD: ICD-10-PCS | Mod: ,,, | Performed by: INTERNAL MEDICINE

## 2023-06-19 PROCEDURE — D9220A PRA ANESTHESIA: Mod: ANES,,, | Performed by: ANESTHESIOLOGY

## 2023-06-19 PROCEDURE — A4216 STERILE WATER/SALINE, 10 ML: HCPCS | Performed by: INTERNAL MEDICINE

## 2023-06-19 PROCEDURE — D9220A PRA ANESTHESIA: Mod: CRNA,,, | Performed by: NURSE ANESTHETIST, CERTIFIED REGISTERED

## 2023-06-19 PROCEDURE — 27201423 OPTIME MED/SURG SUP & DEVICES STERILE SUPPLY: Performed by: INTERNAL MEDICINE

## 2023-06-19 PROCEDURE — 93010 ELECTROCARDIOGRAM REPORT: CPT | Mod: ,,, | Performed by: INTERNAL MEDICINE

## 2023-06-19 PROCEDURE — 25000003 PHARM REV CODE 250: Performed by: INTERNAL MEDICINE

## 2023-06-19 PROCEDURE — 37000009 HC ANESTHESIA EA ADD 15 MINS: Performed by: INTERNAL MEDICINE

## 2023-06-19 PROCEDURE — 85730 THROMBOPLASTIN TIME PARTIAL: CPT | Performed by: INTERNAL MEDICINE

## 2023-06-19 PROCEDURE — C1894 INTRO/SHEATH, NON-LASER: HCPCS | Performed by: INTERNAL MEDICINE

## 2023-06-19 PROCEDURE — 37000008 HC ANESTHESIA 1ST 15 MINUTES: Performed by: INTERNAL MEDICINE

## 2023-06-19 PROCEDURE — C1887 CATHETER, GUIDING: HCPCS | Performed by: INTERNAL MEDICINE

## 2023-06-19 PROCEDURE — D9220A PRA ANESTHESIA: ICD-10-PCS | Mod: ANES,,, | Performed by: ANESTHESIOLOGY

## 2023-06-19 PROCEDURE — 33208 INSRT HEART PM ATRIAL & VENT: CPT | Performed by: INTERNAL MEDICINE

## 2023-06-19 PROCEDURE — C1785 PMKR, DUAL, RATE-RESP: HCPCS | Performed by: INTERNAL MEDICINE

## 2023-06-19 PROCEDURE — C1898 LEAD, PMKR, OTHER THAN TRANS: HCPCS | Performed by: INTERNAL MEDICINE

## 2023-06-19 PROCEDURE — C1769 GUIDE WIRE: HCPCS | Performed by: INTERNAL MEDICINE

## 2023-06-19 PROCEDURE — 63600175 PHARM REV CODE 636 W HCPCS: Performed by: INTERNAL MEDICINE

## 2023-06-19 PROCEDURE — 33208 INSRT HEART PM ATRIAL & VENT: CPT | Mod: 22,KX,GC, | Performed by: INTERNAL MEDICINE

## 2023-06-19 PROCEDURE — 25000003 PHARM REV CODE 250: Performed by: NURSE ANESTHETIST, CERTIFIED REGISTERED

## 2023-06-19 PROCEDURE — 63600175 PHARM REV CODE 636 W HCPCS: Performed by: NURSE ANESTHETIST, CERTIFIED REGISTERED

## 2023-06-19 PROCEDURE — 33208 PR INSER HART PACER XVENOUS ATR/VENTR: ICD-10-PCS | Mod: 22,KX,GC, | Performed by: INTERNAL MEDICINE

## 2023-06-19 PROCEDURE — 25000003 PHARM REV CODE 250: Performed by: ANESTHESIOLOGY

## 2023-06-19 DEVICE — LEAD 3830 US MKT/ 69CM MRI LBBAP
Type: IMPLANTABLE DEVICE | Site: CHEST  WALL | Status: FUNCTIONAL
Brand: SELECTSECURE™ MRI SURESCAN™

## 2023-06-19 DEVICE — IPG W3DR01 AZURE S DR MRI USA
Type: IMPLANTABLE DEVICE | Site: CHEST | Status: FUNCTIONAL
Brand: AZURE™ S DR MRI SURESCAN™

## 2023-06-19 DEVICE — LEAD 5076-52 MRI US RCMCRD
Type: IMPLANTABLE DEVICE | Site: HEART | Status: FUNCTIONAL
Brand: CAPSUREFIX NOVUS MRI™ SURESCAN®

## 2023-06-19 RX ORDER — LIDOCAINE HYDROCHLORIDE 20 MG/ML
INJECTION, SOLUTION EPIDURAL; INFILTRATION; INTRACAUDAL; PERINEURAL
Status: DISCONTINUED | OUTPATIENT
Start: 2023-06-19 | End: 2023-06-19 | Stop reason: HOSPADM

## 2023-06-19 RX ORDER — BUPIVACAINE HYDROCHLORIDE 2.5 MG/ML
INJECTION, SOLUTION EPIDURAL; INFILTRATION; INTRACAUDAL
Status: DISCONTINUED | OUTPATIENT
Start: 2023-06-19 | End: 2023-06-19 | Stop reason: HOSPADM

## 2023-06-19 RX ORDER — DEXAMETHASONE SODIUM PHOSPHATE 4 MG/ML
INJECTION, SOLUTION INTRA-ARTICULAR; INTRALESIONAL; INTRAMUSCULAR; INTRAVENOUS; SOFT TISSUE
Status: DISCONTINUED | OUTPATIENT
Start: 2023-06-19 | End: 2023-06-19

## 2023-06-19 RX ORDER — VANCOMYCIN HYDROCHLORIDE 1 G/20ML
INJECTION, POWDER, LYOPHILIZED, FOR SOLUTION INTRAVENOUS
Status: DISCONTINUED | OUTPATIENT
Start: 2023-06-19 | End: 2023-06-19 | Stop reason: HOSPADM

## 2023-06-19 RX ORDER — IODIXANOL 320 MG/ML
INJECTION, SOLUTION INTRAVASCULAR
Status: DISCONTINUED | OUTPATIENT
Start: 2023-06-19 | End: 2023-06-19

## 2023-06-19 RX ORDER — SODIUM CHLORIDE 9 MG/ML
INJECTION, SOLUTION INTRAMUSCULAR; INTRAVENOUS; SUBCUTANEOUS
Status: DISCONTINUED | OUTPATIENT
Start: 2023-06-19 | End: 2023-06-19 | Stop reason: HOSPADM

## 2023-06-19 RX ORDER — MIDAZOLAM HYDROCHLORIDE 1 MG/ML
INJECTION INTRAMUSCULAR; INTRAVENOUS
Status: DISCONTINUED | OUTPATIENT
Start: 2023-06-19 | End: 2023-06-19

## 2023-06-19 RX ORDER — FENTANYL CITRATE 50 UG/ML
INJECTION, SOLUTION INTRAMUSCULAR; INTRAVENOUS
Status: DISCONTINUED | OUTPATIENT
Start: 2023-06-19 | End: 2023-06-19

## 2023-06-19 RX ORDER — PROPOFOL 10 MG/ML
VIAL (ML) INTRAVENOUS
Status: DISCONTINUED | OUTPATIENT
Start: 2023-06-19 | End: 2023-06-19

## 2023-06-19 RX ORDER — PROPOFOL 10 MG/ML
VIAL (ML) INTRAVENOUS CONTINUOUS PRN
Status: DISCONTINUED | OUTPATIENT
Start: 2023-06-19 | End: 2023-06-19

## 2023-06-19 RX ORDER — DOXYCYCLINE 100 MG/1
100 CAPSULE ORAL 2 TIMES DAILY
Qty: 10 CAPSULE | Refills: 0 | Status: SHIPPED | OUTPATIENT
Start: 2023-06-19 | End: 2023-06-24

## 2023-06-19 RX ORDER — ACETAMINOPHEN 325 MG/1
650 TABLET ORAL EVERY 4 HOURS PRN
Status: DISCONTINUED | OUTPATIENT
Start: 2023-06-19 | End: 2023-06-19 | Stop reason: HOSPADM

## 2023-06-19 RX ORDER — FLUORESCEIN SODIUM AND BENOXINATE HYDROCHLORIDE 2.6; 4.4 MG/ML; MG/ML
1 SOLUTION/ DROPS OPHTHALMIC ONCE
Status: COMPLETED | OUTPATIENT
Start: 2023-06-19 | End: 2023-06-19

## 2023-06-19 RX ORDER — ONDANSETRON 2 MG/ML
4 INJECTION INTRAMUSCULAR; INTRAVENOUS DAILY PRN
Status: DISCONTINUED | OUTPATIENT
Start: 2023-06-19 | End: 2023-06-19 | Stop reason: HOSPADM

## 2023-06-19 RX ORDER — FENTANYL CITRATE 50 UG/ML
25 INJECTION, SOLUTION INTRAMUSCULAR; INTRAVENOUS EVERY 5 MIN PRN
Status: DISCONTINUED | OUTPATIENT
Start: 2023-06-19 | End: 2023-06-19 | Stop reason: HOSPADM

## 2023-06-19 RX ORDER — LIDOCAINE HYDROCHLORIDE 20 MG/ML
INJECTION INTRAVENOUS
Status: DISCONTINUED | OUTPATIENT
Start: 2023-06-19 | End: 2023-06-19

## 2023-06-19 RX ADMIN — SODIUM CHLORIDE: 0.9 INJECTION, SOLUTION INTRAVENOUS at 10:06

## 2023-06-19 RX ADMIN — IODIXANOL 10 ML: 320 INJECTION, SOLUTION INTRAVASCULAR at 10:06

## 2023-06-19 RX ADMIN — CEFAZOLIN 2 G: 2 INJECTION, POWDER, FOR SOLUTION INTRAMUSCULAR; INTRAVENOUS at 10:06

## 2023-06-19 RX ADMIN — MIDAZOLAM HYDROCHLORIDE 1 MG: 1 INJECTION INTRAMUSCULAR; INTRAVENOUS at 10:06

## 2023-06-19 RX ADMIN — PROPOFOL 125 MCG/KG/MIN: 10 INJECTION, EMULSION INTRAVENOUS at 11:06

## 2023-06-19 RX ADMIN — ACETAMINOPHEN 650 MG: 325 TABLET ORAL at 01:06

## 2023-06-19 RX ADMIN — FENTANYL CITRATE 50 MCG: 50 INJECTION, SOLUTION INTRAMUSCULAR; INTRAVENOUS at 10:06

## 2023-06-19 RX ADMIN — PROPOFOL 50 MG: 10 INJECTION, EMULSION INTRAVENOUS at 12:06

## 2023-06-19 RX ADMIN — DEXAMETHASONE SODIUM PHOSPHATE 8 MG: 4 INJECTION, SOLUTION INTRAMUSCULAR; INTRAVENOUS at 10:06

## 2023-06-19 RX ADMIN — LIDOCAINE HYDROCHLORIDE 60 MG: 20 INJECTION INTRAVENOUS at 10:06

## 2023-06-19 RX ADMIN — PROPOFOL 50 MG: 10 INJECTION, EMULSION INTRAVENOUS at 10:06

## 2023-06-19 RX ADMIN — PROPOFOL 125 MCG/KG/MIN: 10 INJECTION, EMULSION INTRAVENOUS at 10:06

## 2023-06-19 RX ADMIN — FLUORESCEIN SODIUM AND BENOXINATE HYDROCHLORIDE 1 DROP: 2.6; 4.4 SOLUTION/ DROPS OPHTHALMIC at 02:06

## 2023-06-19 NOTE — Clinical Note
The lead thresholds were tested. A new lead was attached to the other lead.      The chronic LB lead

## 2023-06-19 NOTE — DISCHARGE SUMMARY
[facility] - Cardiology  Cardiac Electrophysiology  Discharge Summary      Patient Name: Rosa Maria Vazquez  MRN: [id number]  Admission Date: 6/19/2023  Hospital Length of Stay: 0 days  Discharge Date and Time:  06/19/2023 1:50 PM  Attending Physician: Zeke Chance MD    Discharging Provider: Neftaly Marie MD  Primary Care Physician: Neftaly Lebron MD    Hospital Course:  Successful implantation of dual chamber PPM with LBBAP. No immediate complications. Doxy x 5 days. Will plan for TIM/DCCV in 1 month.     HPI: see admit H&P    Procedure(s) (LRB):  INSERTION, CARDIAC PACEMAKER, DUAL CHAMBER (N/A)     Indwelling Lines/Drains at time of discharge:  Lines/Drains/Airways     None               Goals of Care Treatment Preferences:  Code Status: Full Code    Significant Diagnostic Studies: Labs: All labs within the past 24 hours have been reviewed    Pending Diagnostic Studies:     Procedure Component Value Units Date/Time    X-Ray Chest AP Portable [[id number]] Resulted: 06/19/23 1336    Order Status: Sent Lab Status: In process Updated: 06/19/23 1337          There are no hospital problems to display for this patient.    No new Assessment & Plan notes have been filed under this hospital service since the last note was generated.  Service: Arrhythmia      Discharged Condition: stable    Follow Up:   Follow-up Information     Zeke Chance MD Follow up in 3 month(s).    Specialties: Electrophysiology, Cardiology  Contact information:  [address]  [facility] [address]  [phone]             DEVICE CHECK CLINIC Follow up in 1 week(s).    Why: For wound and device check                     Patient Instructions:   Medication instructions:   Complete your 5 days of antibiotics   If you are on a blood thinner (examples: apixiban [Eliquis], rivaroxaban [Xarelto], dabigatran [Pradaxa], or enoxaparin [Lovenox]), do not take your blood thinner for the next 5 days after your procedure. You can resume after 5 days  post-procedure (i.e., when you complete your antibiotics). If you are on Coumadin you can continue to take it the day of your procedure   Pain control: you can take up to Tylenol 1000 mg every 6 hours as needed or (if you do not have kidney disease) ibuprofen 600 mg every 6 hours as needed as needed for pain control (if you do not have kidney disease). If unsure, please contact your primary care physician for recommendations.    Activity restrictions & precautions:   Wear you sling for the next 48 hours, then nightly for the next 6 weeks   Surgical site dressing (see images below)  **Note: these are general rules to follow unless instructed otherwise**  o If you have a brown rectangular gel bandage (A.K.A. Aquacel Ag dressing) over your surgical incision do not remove it. This will be removed at your device clinic follow up appointment in 1 week.  o If you have a square light brown bandage (A.K.A Mepilex dressing) you should remove in 48 hours after your procedure.  o If you have a pressure dressing (white elastic tape with gauze underneath or a very large bandage that covers your left chest and is shaped like a triangle) over your surgical site, this should be removed the morning after your procedure unless instructed otherwise.   You can shower after 24 hours post-procedure, but do not let the jet directly hit your pocket site for at least 2 weeks   Do not submerge your surgical incision site under water (swimming, bathing if you submerge the actual surgical site) for at least 6 week   No lifting over 5-10 pounds using your arm (on the side of your device) for the 2 weeks after your procedure    Do not lift your arm (on the side of your device) above shoulder height for 6 weeks   No driving for 1 week if you use the arm for driving that is on the opposite side as your device, and for 4 weeks if you use your arm for driving on the same side as your device   Follow up in device clinic in 1 week to check  your incision and device function   Please contact the electrophysiology clinic if you have any questions or if you experience: potential surgical/pocket site complications (pain, swelling, bleeding, drainage), fevers, chest pain/shortness of breath, or for any other concerns.         Medications:  Reconciled Home Medications:      Medication List      START taking these medications    doxycycline 100 MG capsule  Commonly known as: MONODOX  Take 1 capsule (100 mg total) by mouth 2 (two) times a day. for 5 days        CONTINUE taking these medications    alendronate 70 MG tablet  Commonly known as: FOSAMAX  TAKE 1 TABLET (70 MG TOTAL) BY MOUTH EVERY 7 DAYS     apixaban 5 mg Tab  Commonly known as: ELIQUIS  Take 1 tablet (5 mg total) by mouth 2 (two) times daily.     calcium citrate-vitamin D3 315-200 mg 315 mg-5 mcg (200 unit) per tablet  Commonly known as: CITRACAL+D  Take 1 tablet by mouth 2 (two) times daily.     cyanocobalamin 1,000 mcg/mL injection  INJECT 1 ML (1,000 MCG TOTAL) INTO THE MUSCLE EVERY 30 DAYS.     famotidine 20 MG tablet  Commonly known as: PEPCID  Take 1 tablet (20 mg total) by mouth 2 (two) times daily.     levothyroxine 112 MCG tablet  Commonly known as: SYNTHROID  TAKE 1 TABLET BY MOUTH BEFORE BREAKFAST.     lisinopriL 10 MG tablet  TAKE 1 TABLET BY MOUTH EVERY DAY     multivitamin capsule  Take 1 capsule by mouth once daily.     rosuvastatin 5 MG tablet  Commonly known as: CRESTOR  Take 1 tablet (5 mg total) by mouth once daily.        STOP taking these medications    doxycycline 100 MG Cap  Commonly known as: VIBRAMYCIN     erythromycin ophthalmic ointment  Commonly known as: ROMYCIN        ASK your doctor about these medications    EScitalopram oxalate 10 MG tablet  Commonly known as: LEXAPRO  Take 1 tablet by mouth once a day            Time spent on the discharge of patient: 15 minutes    Neftaly Marie MD  Cardiac Electrophysiology  [facility] - Cardiology

## 2023-06-19 NOTE — Clinical Note
The lead was inserted under fluorscopic guidance. A new lead was attached to the other lead.      The chronic LB lead

## 2023-06-19 NOTE — ANESTHESIA POSTPROCEDURE EVALUATION
Anesthesia Post Evaluation    Patient: Rosa Maria Vazquez    Procedure(s) Performed: Procedure(s) (LRB):  INSERTION, CARDIAC PACEMAKER, DUAL CHAMBER (N/A)    Final Anesthesia Type: general      Patient location during evaluation: PACU  Patient participation: Yes- Able to Participate  Level of consciousness: awake and alert  Post-procedure vital signs: reviewed and stable  Pain management: adequate  Airway patency: patent    PONV status at discharge: No PONV  Anesthetic complications: no      Cardiovascular status: hemodynamically stable  Respiratory status: spontaneous ventilation  Follow-up not needed.      Pt has healing stye on her L eye but felt like there was also something in her eye post procedure. PACU nurse flushed with saline without improvement then ordered fluoroscein drops. Drops with resolution of patient's eye pain which did not return throughout the remainder of her time in PACU or Phase 2. Eye no longer painful or injected. Sent home with the remainder of the drops and told she can use them up to TID PRN.       Vitals Value Taken Time   /82 06/19/23 1247   Temp 98.0 06/19/23 1255   Pulse 60 06/19/23 1255   Resp 20 06/19/23 1243   SpO2 100 % 06/19/23 1255   Vitals shown include unvalidated device data.      No case tracking events are documented in the log.      Pain/Vitaly Score: No data recorded

## 2023-06-19 NOTE — PROGRESS NOTES
PT is AAOx4. VSS. EKG and Xray done in EP PACU. Pt c/o burning to L eye. Dr. Khanna notified and ordered fluorescein eye gtts.  Gtts received from pharmacy. Anesthesia resident notified and came to bedside to place gtts.

## 2023-06-19 NOTE — Clinical Note
The chest was prepped. The site was prepped with ChloraPrep. The patient was draped. The patient was positioned supine. The patient was secured using safety straps, with ulnar pads and to an armboard.

## 2023-06-19 NOTE — INTERVAL H&P NOTE
The patient has been examined and the H&P has been reviewed: Patient presents for dual chamber PPM with LBBAP for symptomatic bradycardia. No complaints. No fevers or active infections. Off OAC since 16th.       I concur with the findings and no changes have occurred since H&P was written.    Procedure risks, benefits and alternative options discussed and understood by patient/family.        There are no hospital problems to display for this patient.

## 2023-06-19 NOTE — PROGRESS NOTES
Patient left eye still reddness noted and states that not any better notified dr. Khanna new order for drops to eyes and call resident when drops come up from pharmacy.

## 2023-06-19 NOTE — PROGRESS NOTES
Patient admitted to recovery see [facility] for complete assessment pacu bcg's maintained safety measures verified patient instructed onpain scale and patient verbalized understanding. Called for ekg and chest xray . Also called patient's son in law and updated on patient location with the permission of you.

## 2023-06-19 NOTE — HOSPITAL COURSE
Successful implantation of dual chamber PPM with LBBAP. No immediate complications. Doxy x 5 days. Will plan for TIM/DCCV in 1 month.

## 2023-06-19 NOTE — NURSING TRANSFER
Nursing Transfer Note      6/19/2023     Reason patient is being transferred: d/c critieria met     Transfer To: [facility]    Transfer via stretcher    Transfer with cardiac monitoring and registered box     Transported by maribell cordero     Telemetry: yes and registered patient box     Medicines sent: none     Any special needs or follow-up needed: left chest monitor site     Chart send with patient: Yes    Notified: maribell cordero reported to steve cordero     Patient reassessed at: see epic (date, time)  1  Upon arrival to floor: to room no complaints no distress noted. Resident will go to [facility] to reevaluate eye since he put in patient's drops.

## 2023-06-19 NOTE — DISCHARGE INSTRUCTIONS
Medication instructions:  Complete your 5 days of antibiotics  If you are on a blood thinner (examples: apixiban [Eliquis], rivaroxaban [Xarelto], dabigatran [Pradaxa], or enoxaparin [Lovenox]), do not take your blood thinner for the next 5 days after your procedure. You can resume after 5 days post-procedure (i.e., when you complete your antibiotics). If you are on Coumadin you can continue to take it the day of your procedure  Pain control: you can take up to Tylenol 1000 mg every 6 hours as needed or (if you do not have kidney disease) ibuprofen 600 mg every 6 hours as needed as needed for pain control (if you do not have kidney disease). If unsure, please contact your primary care physician for recommendations.    Activity restrictions & precautions:  Wear you sling for the next 48 hours, then nightly for the next 6 weeks  Surgical site dressing (see images below)  **Note: these are general rules to follow unless instructed otherwise**  If you have a brown rectangular gel bandage (A.K.A. Aquacel Ag dressing) over your surgical incision do not remove it. This will be removed at your device clinic follow up appointment in 1 week.  If you have a square light brown bandage (A.K.A Mepilex dressing) you should remove in 48 hours after your procedure.  If you have a pressure dressing (white elastic tape with gauze underneath or a very large bandage that covers your left chest and is shaped like a triangle) over your surgical site, this should be removed the morning after your procedure unless instructed otherwise.  You can shower after 24 hours post-procedure, but do not let the jet directly hit your pocket site for at least 2 weeks  Do not submerge your surgical incision site under water (swimming, bathing if you submerge the actual surgical site) for at least 6 week  No lifting over 5-10 pounds using your arm (on the side of your device) for the 2 weeks after your procedure   Do not lift your arm (on the side of your  device) above shoulder height for 6 weeks  No driving for 1 week if you use the arm for driving that is on the opposite side as your device, and for 4 weeks if you use your arm for driving on the same side as your device  Follow up in device clinic in 1 week to check your incision and device function  Please contact the electrophysiology clinic if you have any questions or if you experience: potential surgical/pocket site complications (pain, swelling, bleeding, drainage), fevers, chest pain/shortness of breath, or for any other concerns.

## 2023-06-19 NOTE — PROGRESS NOTES
Patient came to pacu with left eye reddness noted and patient stated she feels like something in it she did say that she just had a stye in eye will monitor.

## 2023-06-19 NOTE — PLAN OF CARE
Amb with nurse, amrit well. Will monitor.   
Patient arrived to room. PIV placed, labs sent. Admit assessment completed. Plan of care discussed with patient. Will monitor   
Patient discharged per MD orders. Instructions given on medications, wound care, activity, signs of infection, when to call MD, and follow up appointments. Pt verbalized understanding. Telemetry and PIV removed. Patient awaiting wc to take to pharmacy and meet family at private vehicle.   
Pt co eye pain, anesthesia to bedside. Ordered med from pharmacy.  
Received report from GABY Clifford. Patient s/p PPM placement, AAOx3. VSS, no c/o pain or discomfort at this time, resp even and unlabored. Dressing to chest wall is CDI. No active bleeding. No hematoma noted. Post procedure protocol reviewed with patient and patient's family. Understanding verbalized. Family members at bedside. Nurse call bell within reach. Will continue to monitor per post procedure protocol.   
(1) More than 48 hours/None

## 2023-06-19 NOTE — TRANSFER OF CARE
"Anesthesia Transfer of Care Note    Patient: Rosa Maria Vazquez    Procedure(s) Performed: Procedure(s) (LRB):  INSERTION, CARDIAC PACEMAKER, DUAL CHAMBER (N/A)    Patient location: PACU    Anesthesia Type: MAC    Transport from OR: Transported from OR on 6-10 L/min O2 by face mask with adequate spontaneous ventilation    Post pain: adequate analgesia    Post assessment: no apparent anesthetic complications    Post vital signs: stable    Level of consciousness: confused    Nausea/Vomiting: no nausea/vomiting    Complications: none    Transfer of care protocol was followed      Last vitals:   Visit Vitals 6/19/23 1242  /64 (BP Location: Right arm, Patient Position: Lying)   Pulse 62   Temp 36.3 °C (97.3 °F) (Temporal)   Resp 18   Ht 5' 4" (1.626 m)   Wt 91.6 kg (202 lb)   SpO2 100%   Breastfeeding No   BMI 34.67 kg/m²     "

## 2023-06-19 NOTE — ANESTHESIA PREPROCEDURE EVALUATION
2023  Rosa Maria Vazquez is a 72 y.o., female.  Pre-operative evaluation for Procedure(s) (LRB):  INSERTION, CARDIAC PACEMAKER, DUAL CHAMBER (N/A)    Rosa Maria Vazquez is a 72 y.o. female       Patient Active Problem List   Diagnosis    Essential hypertension    Acquired hypothyroidism    Age-related osteoporosis without current pathological fracture    History of Naga-en-Y gastric bypass    Stage 3a chronic kidney disease    Vitamin D deficiency    Venous insufficiency    Atherosclerosis of aorta    Persistent atrial fibrillation    Osteopenia    Atrial fibrillation with slow ventricular response    Symptomatic bradycardia       Past Surgical History:   Procedure Laterality Date    EYE SURGERY      GASTRIC BYPASS         Social History     Socioeconomic History    Marital status:    Tobacco Use    Smoking status: Former     Packs/day: 1.00     Years: 10.00     Pack years: 10.00     Types: Cigarettes     Quit date:      Years since quittin.4    Smokeless tobacco: Never   Substance and Sexual Activity    Alcohol use: Never    Drug use: Never    Sexual activity: Not Currently     Partners: Male     Comment:  (no interest)       No current facility-administered medications on file prior to encounter.     Current Outpatient Medications on File Prior to Encounter   Medication Sig Dispense Refill    alendronate (FOSAMAX) 70 MG tablet TAKE 1 TABLET (70 MG TOTAL) BY MOUTH EVERY 7 DAYS 4 tablet 11    apixaban (ELIQUIS) 5 mg Tab Take 1 tablet (5 mg total) by mouth 2 (two) times daily. 180 tablet 3    calcium citrate-vitamin D3 315-200 mg (CITRACAL+D) 315 mg-5 mcg (200 unit) per tablet Take 1 tablet by mouth 2 (two) times daily.      cyanocobalamin 1,000 mcg/mL injection INJECT 1 ML (1,000 MCG TOTAL) INTO THE MUSCLE EVERY 30 DAYS. 3 mL 3    doxycycline (VIBRAMYCIN) 100 MG Cap  Take 1 capsule (100 mg total) by mouth every 12 (twelve) hours. (Patient not taking: Reported on 6/9/2023) 20 capsule 0    EScitalopram oxalate (LEXAPRO) 10 MG tablet Take 1 tablet by mouth once a day 30 tablet 0    famotidine (PEPCID) 20 MG tablet Take 1 tablet (20 mg total) by mouth 2 (two) times daily. (Patient taking differently: Take 20 mg by mouth Daily.) 60 tablet 3    levothyroxine (SYNTHROID) 112 MCG tablet TAKE 1 TABLET BY MOUTH BEFORE BREAKFAST. 30 tablet 11    multivitamin capsule Take 1 capsule by mouth once daily.      rosuvastatin (CRESTOR) 5 MG tablet Take 1 tablet (5 mg total) by mouth once daily. 90 tablet 3       Review of patient's allergies indicates:  No Known Allergies      CBC: No results for input(s): WBC, RBC, HGB, HCT, PLT, MCV, MCH, MCHC in the last 72 hours.    CMP: No results for input(s): NA, K, CL, CO2, BUN, CREATININE, GLU, MG, PHOS, CALCIUM, ALBUMIN, PROT, ALKPHOS, ALT, AST, BILITOT in the last 72 hours.    INR  No results for input(s): PT, INR, PROTIME, APTT in the last 72 hours.      Diagnostic Studies:    EKG:   Results for orders placed or performed in visit on 05/11/23   EKG 12-lead    Collection Time: 05/11/23  9:28 AM    Narrative    Test Reason : I70.0,I10,    Vent. Rate : 053 BPM     Atrial Rate : 055 BPM     P-R Int : 000 ms          QRS Dur : 080 ms      QT Int : 420 ms       P-R-T Axes : 000 006 069 degrees     QTc Int : 394 ms    Atrial fibrillation  Low voltage QRS  Abnormal ECG  No previous ECGs available  Confirmed by Deedee Li MD ([id number]) on 5/11/2023 1:15:31 PM    Referred By:             Confirmed By:Deedee Li MD       TTE:  Results for orders placed or performed during the hospital encounter of 11/23/21   Echo Saline Bubble? No   Result Value Ref Range    BSA 2.08 m2    TDI SEPTAL 0.08 m/s    LV LATERAL E/E' RATIO 8.11 m/s    LV SEPTAL E/E' RATIO 9.13 m/s    LA WIDTH 4.24 cm    TDI LATERAL 0.09 m/s    LVIDd 3.84 3.5 - 6.0 cm    IVS 0.80 0.6 - 1.1 cm     "Posterior Wall 0.57 (A) 0.6 - 1.1 cm    LVIDs 2.97 2.1 - 4.0 cm    FS 23 28 - 44 %    LA volume 75.39 cm3    Sinus 2.70 cm    STJ 2.27 cm    Ascending aorta 2.75 cm    LV mass 71.15 g    LA size 3.61 cm    RVDD 3.85 cm    TAPSE 2.33 cm    RV S' 10.24 cm/s    Left Ventricle Relative Wall Thickness 0.30 cm    AV mean gradient 8 mmHg    AV valve area 2.11 cm2    AV Velocity Ratio 0.68     AV index (prosthetic) 0.64     MV valve area p 1/2 method 2.51 cm2    E/A ratio 0.86     Mean e' 0.09 m/s    E wave deceleration time 302.74 msec    IVRT 99.90 msec    MV "A" wave duration 16.37 msec    Pulm vein S/D ratio 1.48     LVOT diameter 2.05 cm    LVOT area 3.3 cm2    LVOT peak nic 1.35 m/s    LVOT peak VTI 31.26 cm    Ao peak nic 1.99 m/s    Ao VTI 48.76 cm    LVOT stroke volume 103.13 cm3    AV peak gradient 16 mmHg    E/E' ratio 8.59 m/s    MV Peak E Nic 0.73 m/s    TR Max Nic 2.46 m/s    MV stenosis pressure 1/2 time 87.80 ms    MV Peak A Nic 0.85 m/s    PV Peak S Nic 0.37 m/s    PV Peak D Nic 0.25 m/s    LV Systolic Volume 34.02 mL    LV Systolic Volume Index 16.8 mL/m2    LV Diastolic Volume 63.41 mL    LV Diastolic Volume Index 31.39 mL/m2    LA Volume Index 37.3 mL/m2    LV Mass Index 35 g/m2    RA Major Axis 5.23 cm    Left Atrium Minor Axis 5.77 cm    Left Atrium Major Axis 5.82 cm    Triscuspid Valve Regurgitation Peak Gradient 24 mmHg    LA Volume Index (Mod) 32.4 mL/m2    LA volume (mod) 65.38 cm3    RA Width 4.26 cm    Right Atrial Pressure (from IVC) 3 mmHg    EF 65 %    TV rest pulmonary artery pressure 27 mmHg    Narrative    · The left ventricle is normal in size with normal systolic function.  · Mild left atrial enlargement.  · The estimated ejection fraction is 65%.  · Grade I left ventricular diastolic dysfunction.  · Basal septal hypertrophy with mild systolic flow acceleration without   significant obstruction, suspect etiology of murmur.  · Normal right ventricular size with normal right ventricular " systolic   function.  · Mild tricuspid regurgitation.  · Normal central venous pressure (3 mmHg).  · The estimated PA systolic pressure is 27 mmHg.        EF   Date Value Ref Range Status   11/23/2021 65 % Final      No results found for this or any previous visit.    TIM:  No results found for this or any previous visit.    Stress Test:  No results found for this or any previous visit.       LHC:  No results found for this or any previous visit.       PFT:  No results found for: FEV1, FVC, DOJ0AWQ, TLC, DLCO     ALLERGIES:   Review of patient's allergies indicates:  No Known Allergies  LDA:          Lines/Drains/Airways     None                Anesthesia Evaluation      Airway   Mallampati: II  TM distance: > 6 cm  Neck ROM: Normal ROM  Dental    (+) Intact    Pulmonary    Cardiovascular   (+) hypertension,     Rate: Normal    Neuro/Psych      GI/Hepatic/Renal      Endo/Other    (+) hypothyroidism,   Abdominal                         Pre-op Assessment    I have reviewed the Patient Summary Reports.     I have reviewed the Nursing Notes. I have reviewed the NPO Status.   I have reviewed the Medications.     Review of Systems  Anesthesia Hx:  No problems with previous Anesthesia  Denies Family Hx of Anesthesia complications.   Denies Personal Hx of Anesthesia complications.   Cardiovascular:   Hypertension    Pulmonary:  Pulmonary Normal    Renal/:  Renal/ Normal     Hepatic/GI:  Hepatic/GI Normal    Neurological:  Neurology Normal    Endocrine:   Hypothyroidism        Physical Exam  General: Well nourished    Airway:  Mallampati: II   Mouth Opening: Normal  TM Distance: > 6 cm  Tongue: Normal  Neck ROM: Normal ROM    Dental:  Intact    Chest/Lungs:  Normal Respiratory Rate    Heart:  Rate: Normal        Anesthesia Plan  Type of Anesthesia, risks & benefits discussed:    Anesthesia Type: Gen Natural Airway, MAC  Intra-op Monitoring Plan: Standard ASA Monitors  Post Op Pain Control Plan: multimodal analgesia and  IV/PO Opioids PRN  Induction:  IV  Airway Plan: Direct, Post-Induction  Informed Consent: Informed consent signed with the Patient and all parties understand the risks and agree with anesthesia plan.  All questions answered. Patient consented to blood products? Yes  ASA Score: 3  Day of Surgery Review of History & Physical: H&P Update referred to the surgeon/provider.    Ready For Surgery From Anesthesia Perspective.     .

## 2023-06-20 ENCOUNTER — TELEPHONE (OUTPATIENT)
Dept: CARDIOLOGY | Facility: HOSPITAL | Age: 73
End: 2023-06-20
Payer: MEDICARE

## 2023-06-20 NOTE — TELEPHONE ENCOUNTER
Spoke with patient and informed her it is okay to remove all of her bandage except the medicated one.  Patient stated understanding.   ----- Message from Julianna Edwards MA sent at 6/20/2023  8:52 AM CDT -----    ----- Message -----  From: Fernando Morgan  Sent: 6/20/2023   8:12 AM CDT  To: [facility] Staff    Name Of Caller: Rosa Maria        Provider Name: Zeke Chance        Does patient feel the need to be seen today? no        Relationship to the Pt?: patient        Contact Preference?: [phone]        What is the nature of the call?: Patient had a pacemaker inserted on yesterday and states that she would like to speak with someone in the office in regards to some questions she has about the removal of her dressings.

## 2023-06-21 DIAGNOSIS — I48.19 PERSISTENT ATRIAL FIBRILLATION: Primary | ICD-10-CM

## 2023-06-27 ENCOUNTER — CLINICAL SUPPORT (OUTPATIENT)
Dept: CARDIOLOGY | Facility: HOSPITAL | Age: 73
End: 2023-06-27
Attending: INTERNAL MEDICINE
Payer: MEDICARE

## 2023-06-27 ENCOUNTER — TELEPHONE (OUTPATIENT)
Dept: ELECTROPHYSIOLOGY | Facility: CLINIC | Age: 73
End: 2023-06-27
Payer: MEDICARE

## 2023-06-27 DIAGNOSIS — I10 ESSENTIAL HYPERTENSION: ICD-10-CM

## 2023-06-27 DIAGNOSIS — N18.31 STAGE 3A CHRONIC KIDNEY DISEASE: ICD-10-CM

## 2023-06-27 DIAGNOSIS — R00.1 SYMPTOMATIC BRADYCARDIA: ICD-10-CM

## 2023-06-27 DIAGNOSIS — I48.19 PERSISTENT ATRIAL FIBRILLATION: ICD-10-CM

## 2023-06-27 DIAGNOSIS — I48.91 ATRIAL FIBRILLATION WITH SLOW VENTRICULAR RESPONSE: ICD-10-CM

## 2023-06-27 DIAGNOSIS — I48.19 PERSISTENT ATRIAL FIBRILLATION: Primary | ICD-10-CM

## 2023-06-27 PROCEDURE — 93280 CARDIAC DEVICE CHECK - IN CLINIC & HOSPITAL: ICD-10-PCS | Mod: 26,,, | Performed by: INTERNAL MEDICINE

## 2023-06-27 PROCEDURE — 93280 PM DEVICE PROGR EVAL DUAL: CPT

## 2023-06-27 PROCEDURE — 93280 PM DEVICE PROGR EVAL DUAL: CPT | Mod: 26,,, | Performed by: INTERNAL MEDICINE

## 2023-06-27 NOTE — TELEPHONE ENCOUNTER
Spoke with  Patient scheduled for TIM/DCCV procedure on 7/18/2023 with Dr Chance. Procedure details reviewed and advised that pre procedure patient instructions will be sent via emial as requested. Advised to call the office for any questions or concerns prior to scheduled procedure. Understanding verbalized.

## 2023-07-10 ENCOUNTER — OFFICE VISIT (OUTPATIENT)
Dept: INTERNAL MEDICINE | Facility: CLINIC | Age: 73
End: 2023-07-10
Payer: MEDICARE

## 2023-07-10 VITALS
HEIGHT: 64 IN | HEART RATE: 75 BPM | OXYGEN SATURATION: 99 % | DIASTOLIC BLOOD PRESSURE: 75 MMHG | WEIGHT: 198 LBS | SYSTOLIC BLOOD PRESSURE: 115 MMHG | BODY MASS INDEX: 33.8 KG/M2

## 2023-07-10 DIAGNOSIS — H00.012 HORDEOLUM EXTERNUM OF RIGHT LOWER EYELID: Primary | ICD-10-CM

## 2023-07-10 PROCEDURE — 99214 OFFICE O/P EST MOD 30 MIN: CPT | Mod: S$PBB,GC,,

## 2023-07-10 PROCEDURE — 99999 PR PBB SHADOW E&M-EST. PATIENT-LVL III: CPT | Mod: PBBFAC,GC,,

## 2023-07-10 PROCEDURE — 99214 PR OFFICE/OUTPT VISIT, EST, LEVL IV, 30-39 MIN: ICD-10-PCS | Mod: S$PBB,GC,,

## 2023-07-10 PROCEDURE — 99213 OFFICE O/P EST LOW 20 MIN: CPT | Mod: PBBFAC

## 2023-07-10 PROCEDURE — 99999 PR PBB SHADOW E&M-EST. PATIENT-LVL III: ICD-10-PCS | Mod: PBBFAC,GC,,

## 2023-07-10 RX ORDER — TOBRAMYCIN AND DEXAMETHASONE 3; 1 MG/ML; MG/ML
1 SUSPENSION/ DROPS OPHTHALMIC
Qty: 10 ML | Refills: 0 | Status: SHIPPED | OUTPATIENT
Start: 2023-07-10 | End: 2023-07-10

## 2023-07-10 RX ORDER — TOBRAMYCIN AND DEXAMETHASONE 3; 1 MG/ML; MG/ML
1 SUSPENSION/ DROPS OPHTHALMIC
Qty: 10 ML | Refills: 1 | Status: CANCELLED | OUTPATIENT
Start: 2023-07-10

## 2023-07-10 RX ORDER — TOBRAMYCIN AND DEXAMETHASONE 3; 1 MG/ML; MG/ML
1 SUSPENSION/ DROPS OPHTHALMIC
Qty: 10 ML | Refills: 0 | Status: SHIPPED | OUTPATIENT
Start: 2023-07-10 | End: 2023-09-26

## 2023-07-10 NOTE — PATIENT INSTRUCTIONS
-Warm Compresses every 4-6 hours. Gentle massage lower eyelid form outer to inner side.  -You should expect improvement in 3-5 days days.  If inflammation persists after weeks visit ophtalmologist.  Tylenol every 8 hours as needed.  Loratadine or cetirizine 10 mg 1 tablet a day.  -Antibiotic drops to be used only if symptoms worsen or signs of infection as discussed.

## 2023-07-10 NOTE — PROGRESS NOTES
Clinic Note  7/10/2023      Subjective:       Patient ID:  Rosa Maria is a 72 y.o. female being seen for an established visit.    Chief Complaint: Stye    HPI  Ms. Vazquez is here for urgent care visit.  She tried to make an appointment with her ophthalmologist with nearest data available was July 19th.  Today complaining right eye swelling, pruritus and discomfort this started Friday evening.  Denies visual disturbances.  States that she has had stye on both eyes in the past.  Sometimes taking up to 2 weeks to resolve.  Most recent episode was on June 19 while hospitalized when getting pacemaker.  At that time she was prescribed ciprofloxacin eyedrops which really did not make a difference.  For the past 2 days she has been using warm compresses and Tylenol, states she has woken up with some mucoid discharge.     Review of Systems   Constitutional:  Negative for fever.   HENT:  Negative for congestion, hearing loss, nosebleeds, sinus pain and tinnitus.    Eyes:  Positive for pain (not marisel pain but discomfort in right eye), discharge and redness. Negative for blurred vision, double vision and photophobia.   Respiratory:  Negative for cough, shortness of breath and wheezing.    Cardiovascular:  Negative for chest pain, palpitations and leg swelling.   Gastrointestinal: Negative.  Negative for abdominal pain, constipation, diarrhea, nausea and vomiting.   Genitourinary: Negative.    Musculoskeletal:  Negative for back pain, joint pain and neck pain.   Skin:  Negative for rash.   Neurological:  Negative for dizziness, seizures, weakness and headaches.   Endo/Heme/Allergies: Negative.    Psychiatric/Behavioral:  Negative for depression and memory loss. The patient is not nervous/anxious.      Past Medical History:   Diagnosis Date    Anticoagulant long-term use     Atrial fibrillation     Hypertension     Thyroid disease        Family History   Problem Relation Age of Onset    Heart disease Mother     Heart disease  Father     Diabetes Sister     Protein C deficiency Sister     Protein S deficiency Sister     Rectal cancer Sister         reports that she quit smoking about 43 years ago. Her smoking use included cigarettes. She has a 10.00 pack-year smoking history. She has never used smokeless tobacco. She reports that she does not drink alcohol and does not use drugs.    Medication List with Changes/Refills   New Medications    TOBRAMYCIN-DEXAMETHASONE 0.3-0.1% (TOBRADEX) 0.3-0.1 % DRPS    Place 1 drop into the right eye every 4 (four) hours while awake.   Current Medications    ALENDRONATE (FOSAMAX) 70 MG TABLET    TAKE 1 TABLET (70 MG TOTAL) BY MOUTH EVERY 7 DAYS    APIXABAN (ELIQUIS) 5 MG TAB    Take 1 tablet (5 mg total) by mouth 2 (two) times daily.    CALCIUM CITRATE-VITAMIN D3 315-200 MG (CITRACAL+D) 315 MG-5 MCG (200 UNIT) PER TABLET    Take 1 tablet by mouth 2 (two) times daily.    CYANOCOBALAMIN 1,000 MCG/ML INJECTION    INJECT 1 ML (1,000 MCG TOTAL) INTO THE MUSCLE EVERY 30 DAYS.    ESCITALOPRAM OXALATE (LEXAPRO) 10 MG TABLET    Take 1 tablet by mouth once a day    FAMOTIDINE (PEPCID) 20 MG TABLET    Take 1 tablet (20 mg total) by mouth 2 (two) times daily.    LEVOTHYROXINE (SYNTHROID) 112 MCG TABLET    TAKE 1 TABLET BY MOUTH BEFORE BREAKFAST.    LISINOPRIL 10 MG TABLET    TAKE 1 TABLET BY MOUTH EVERY DAY    MULTIVITAMIN CAPSULE    Take 1 capsule by mouth once daily.    ROSUVASTATIN (CRESTOR) 5 MG TABLET    Take 1 tablet (5 mg total) by mouth once daily.   Discontinued Medications    CIPROFLOXACIN HCL (CILOXAN) 0.3 % OPHTHALMIC SOLUTION    INSTILL 1 DROP INTO LEFT EYE EVERY 2 HOURS FOR 12 DAYS     Review of patient's allergies indicates:   Allergen Reactions    Lopressor [metoprolol tartrate] Anaphylaxis    Ondansetron Other (See Comments)     Double vision         Patient Active Problem List   Diagnosis    Essential hypertension    Acquired hypothyroidism    Age-related osteoporosis without current pathological  "fracture    History of Naga-en-Y gastric bypass    Stage 3a chronic kidney disease    Vitamin D deficiency    Venous insufficiency    Atherosclerosis of aorta    Persistent atrial fibrillation    Osteopenia    Atrial fibrillation with slow ventricular response    Symptomatic bradycardia           Objective:      /75 (BP Location: Left arm, Patient Position: Sitting, BP Method: Large (Automatic))   Pulse 75   Ht 5' 4" (1.626 m)   Wt 89.8 kg (197 lb 15.6 oz)   SpO2 99%   BMI 33.98 kg/m²   Estimated body mass index is 33.98 kg/m² as calculated from the following:    Height as of this encounter: 5' 4" (1.626 m).    Weight as of this encounter: 89.8 kg (197 lb 15.6 oz).  Physical Exam  Constitutional:       General: She is not in acute distress.  HENT:      Head: Normocephalic and atraumatic.      Right Ear: External ear normal.      Left Ear: External ear normal.   Eyes:      General: No visual field deficit or scleral icterus.        Right eye: Hordeolum present.      Extraocular Movements:      Right eye: No nystagmus.      Left eye: No nystagmus.     Cardiovascular:      Rate and Rhythm: Normal rate.   Pulmonary:      Effort: Pulmonary effort is normal.      Breath sounds: Normal breath sounds.   Skin:     General: Skin is warm.   Neurological:      General: No focal deficit present.      Mental Status: She is alert and oriented to person, place, and time.       Assessment and Plan:         Rosa Maria was seen today for belepharitis.    Diagnoses and all orders for this visit:    Hordeolum externum of right lower eyelid  -We discussed th clinical natural course of stye, explained that symptoms can take up to two weeks with conservative management .  -Recommended warm compresses and tylenol  -Suggested to keep appointment with ophthalmology for July 19 in case symptoms have worsen.    Sent eye drops that are to be used if symptoms worsen in the next week. (increased mucupurulent discharge, surrounding erythema , " visual disturbances )    -     tobramycin-dexAMETHasone 0.3-0.1% (TOBRADEX) 0.3-0.1 % DrpS; Place 1 drop into the right eye every 4 (four) hours while awake.        No follow-ups on file.    Other Orders Placed This Visit:  No orders of the defined types were placed in this encounter.            ASHLEY Hartman MD  PGY-2 Internal Medicine  [facility]

## 2023-07-11 ENCOUNTER — LAB VISIT (OUTPATIENT)
Dept: LAB | Facility: HOSPITAL | Age: 73
End: 2023-07-11
Attending: INTERNAL MEDICINE
Payer: MEDICARE

## 2023-07-11 DIAGNOSIS — I48.19 PERSISTENT ATRIAL FIBRILLATION: ICD-10-CM

## 2023-07-11 DIAGNOSIS — N18.31 STAGE 3A CHRONIC KIDNEY DISEASE: ICD-10-CM

## 2023-07-11 DIAGNOSIS — R00.1 SYMPTOMATIC BRADYCARDIA: ICD-10-CM

## 2023-07-11 DIAGNOSIS — I10 ESSENTIAL HYPERTENSION: ICD-10-CM

## 2023-07-11 LAB
ANION GAP SERPL CALC-SCNC: 9 MMOL/L (ref 8–16)
APTT PPP: 26.2 SEC (ref 21–32)
BASOPHILS # BLD AUTO: 0.02 K/UL (ref 0–0.2)
BASOPHILS NFR BLD: 0.5 % (ref 0–1.9)
BUN SERPL-MCNC: 22 MG/DL (ref 8–23)
CALCIUM SERPL-MCNC: 10 MG/DL (ref 8.7–10.5)
CHLORIDE SERPL-SCNC: 107 MMOL/L (ref 95–110)
CO2 SERPL-SCNC: 23 MMOL/L (ref 23–29)
CREAT SERPL-MCNC: 1 MG/DL (ref 0.5–1.4)
DIFFERENTIAL METHOD: NORMAL
EOSINOPHIL # BLD AUTO: 0.2 K/UL (ref 0–0.5)
EOSINOPHIL NFR BLD: 4.3 % (ref 0–8)
ERYTHROCYTE [DISTWIDTH] IN BLOOD BY AUTOMATED COUNT: 14.4 % (ref 11.5–14.5)
EST. GFR  (NO RACE VARIABLE): >60 ML/MIN/1.73 M^2
GLUCOSE SERPL-MCNC: 78 MG/DL (ref 70–110)
HCT VFR BLD AUTO: 41.5 % (ref 37–48.5)
HGB BLD-MCNC: 13.3 G/DL (ref 12–16)
IMM GRANULOCYTES # BLD AUTO: 0.02 K/UL (ref 0–0.04)
IMM GRANULOCYTES NFR BLD AUTO: 0.5 % (ref 0–0.5)
INR PPP: 1 (ref 0.8–1.2)
LYMPHOCYTES # BLD AUTO: 1.4 K/UL (ref 1–4.8)
LYMPHOCYTES NFR BLD: 35.2 % (ref 18–48)
MCH RBC QN AUTO: 27.6 PG (ref 27–31)
MCHC RBC AUTO-ENTMCNC: 32 G/DL (ref 32–36)
MCV RBC AUTO: 86 FL (ref 82–98)
MONOCYTES # BLD AUTO: 0.3 K/UL (ref 0.3–1)
MONOCYTES NFR BLD: 7.4 % (ref 4–15)
NEUTROPHILS # BLD AUTO: 2 K/UL (ref 1.8–7.7)
NEUTROPHILS NFR BLD: 52.1 % (ref 38–73)
NRBC BLD-RTO: 0 /100 WBC
PLATELET # BLD AUTO: 191 K/UL (ref 150–450)
PMV BLD AUTO: 11.3 FL (ref 9.2–12.9)
POTASSIUM SERPL-SCNC: 4.7 MMOL/L (ref 3.5–5.1)
PROTHROMBIN TIME: 11.1 SEC (ref 9–12.5)
RBC # BLD AUTO: 4.82 M/UL (ref 4–5.4)
SODIUM SERPL-SCNC: 139 MMOL/L (ref 136–145)
WBC # BLD AUTO: 3.92 K/UL (ref 3.9–12.7)

## 2023-07-11 PROCEDURE — 85610 PROTHROMBIN TIME: CPT | Performed by: INTERNAL MEDICINE

## 2023-07-11 PROCEDURE — 85025 COMPLETE CBC W/AUTO DIFF WBC: CPT | Performed by: INTERNAL MEDICINE

## 2023-07-11 PROCEDURE — 36415 COLL VENOUS BLD VENIPUNCTURE: CPT | Mod: PN | Performed by: INTERNAL MEDICINE

## 2023-07-11 PROCEDURE — 85730 THROMBOPLASTIN TIME PARTIAL: CPT | Performed by: INTERNAL MEDICINE

## 2023-07-11 PROCEDURE — 80048 BASIC METABOLIC PNL TOTAL CA: CPT | Performed by: INTERNAL MEDICINE

## 2023-07-13 ENCOUNTER — TELEPHONE (OUTPATIENT)
Dept: ELECTROPHYSIOLOGY | Facility: CLINIC | Age: 73
End: 2023-07-13
Payer: MEDICARE

## 2023-07-13 NOTE — TELEPHONE ENCOUNTER
Spoke to patient .     CONFIRMED procedure arrival time of 8 am on 7/18/2023 for TIM/DCCV with Dr Chance.     Reiterated instructions including:    -Directions to check in desk    -NPO after midnight night prior to procedure. Fasting upon arrival to the hospital the day of the procedure.     -High importance of HOLDING Lisinopril the morning of the procedure.     -Confirmed compliance of Eliquis as prescribed with no missed doses to date.     -Pre-procedure LABS reviewed with no alerts noted.     -Confirmed absence or presence of implanted device/stimulator MDT PPM in situ.    -Confirmed no recent or current fever, cough, or shortness of breath .    -Confirmed no redness, rash, irritation, or yeast infection to skin/ chest  area.       Patient verbalized understanding of above, denies any further questions and appreciated the call.

## 2023-07-17 NOTE — CONSULTS
[facility] [place]  TIM Consult      Rosa Maria Vazquez  YOB: 1950  Medical Record Number:  [id number]  Attending Physician:  Zeke Chance MD   Current Principal Problem: Persistent Atrial Fibrillation    History     Cc: Persistent Atrial Fibrillation, DCCV    HPI  73yo female with history of persistent atrial fibrillation, HTN, venous insufficiency, hypothyroidism, obesity with prior gastri bypass 2003. Retired ICU nurse. Presents for TIM +/- DCCV.    TTE 11/2021  The left ventricle is normal in size with normal systolic function.  Mild left atrial enlargement.  The estimated ejection fraction is 65%.  Grade I left ventricular diastolic dysfunction.  Basal septal hypertrophy with mild systolic flow acceleration without significant obstruction, suspect etiology of murmur.  Normal right ventricular size with normal right ventricular systolic function.  Mild tricuspid regurgitation.  Normal central venous pressure (3 mmHg).  The estimated PA systolic pressure is 27 mmHg.     [id number] 4  Anticoagulant/antiplatelets: eliquis, compliant  ECG: AF,     Prior gastric bypass surgery  Dysphagia or odynophagia:  No  Liver Disease, esophageal disease, or known varices:  No  Upper GI Bleeding: No  Snoring:  No  Sleep Apnea:  No  Prior neck surgery or radiation:  No  History of anesthetic difficulties:  No  Family history of anesthetic difficulties:  No  Last oral intake: yesterday before midnight  Able to move neck in all directions:  Yes    Medications - Outpatient  Prior to Admission medications    Medication Sig Start Date End Date Taking? Authorizing Provider   alendronate (FOSAMAX) 70 MG tablet TAKE 1 TABLET (70 MG TOTAL) BY MOUTH EVERY 7 DAYS 5/29/23 5/28/24  Neftaly Lebron MD   apixaban (ELIQUIS) 5 mg Tab Take 1 tablet (5 mg total) by mouth 2 (two) times daily. 5/11/23   Deedee Li MD   calcium citrate-vitamin D3 315-200 mg (CITRACAL+D) 315 mg-5 mcg (200 unit) per tablet Take 1  tablet by mouth 2 (two) times daily.    Historical Provider   cyanocobalamin 1,000 mcg/mL injection INJECT 1 ML (1,000 MCG TOTAL) INTO THE MUSCLE EVERY 30 DAYS. 10/18/22   Torrie Wright DNP   EScitalopram oxalate (LEXAPRO) 10 MG tablet Take 1 tablet by mouth once a day  Patient not taking: Reported on 6/19/2023 5/12/23      famotidine (PEPCID) 20 MG tablet Take 1 tablet (20 mg total) by mouth 2 (two) times daily.  Patient taking differently: Take 20 mg by mouth daily as needed. 9/1/22 9/1/23  Neftaly Lebron MD   levothyroxine (SYNTHROID) 112 MCG tablet TAKE 1 TABLET BY MOUTH BEFORE BREAKFAST. 10/18/22   Torrie Wright DNP   lisinopriL 10 MG tablet TAKE 1 TABLET BY MOUTH EVERY DAY 6/12/23   Neftaly Lebron MD   multivitamin capsule Take 1 capsule by mouth once daily.    Historical Provider   rosuvastatin (CRESTOR) 5 MG tablet Take 1 tablet (5 mg total) by mouth once daily. 5/11/23   Deedee Li MD   tobramycin-dexAMETHasone 0.3-0.1% (TOBRADEX) 0.3-0.1 % DrpS Place 1 drop into the right eye every 4 (four) hours while awake. 7/10/23   Que Hartman MD     Allergies  Review of patient's allergies indicates:   Allergen Reactions    Lopressor [metoprolol tartrate] Anaphylaxis    Ondansetron Other (See Comments)     Double vision       Past Medical History  Past Medical History:   Diagnosis Date    Anticoagulant long-term use     Atrial fibrillation     Hypertension     Thyroid disease      Past Surgical History  Past Surgical History:   Procedure Laterality Date    A-V CARDIAC PACEMAKER INSERTION N/A 6/19/2023    Procedure: INSERTION, CARDIAC PACEMAKER, DUAL CHAMBER;  Surgeon: Zeke Chance MD;  Location: [facility] EP LAB;  Service: Cardiology;  Laterality: N/A;  SB/AF, Dual PPM with LBAP lead, MDT, MAC, [place], 3 Prep    EYE SURGERY      GASTRIC BYPASS       ROS  10 point ROS performed and negative except as stated in HPI     Physical Examination   Vital Signs       24 Hour VS Range  BP: ()/()   Arterial  Line BP: ()/()   No intake or output data in the 24 hours ending 07/17/23 1425      Physical Exam:   Constitutional: no acute distress  HEENT: NCAT, EOMI, no scleral icterus  Cardiovascular: Regular rate and rhythm  Pulmonary: Normal respiratory effort   Abdomen: nontender, non-distended   Neuro: alert and oriented, no focal deficits  Extremities: warm, no edema   MSK: no deformities  Skin: intact, no rashes     Data     Recent Labs   Lab 07/11/23  0853   WBC 3.92   HGB 13.3   HCT 41.5         Recent Labs   Lab 07/11/23  0853   INR 1.0      Recent Labs   Lab 07/11/23  0853      K 4.7      CO2 23   BUN 22   CREATININE 1.0   ANIONGAP 9   CALCIUM 10.0      Assessment & Plan     TIM for STEVE assessment prior to cardioversion  -No absolute contraindications of esophageal stricture, tumor, perforation, laceration,or diverticulum and/or active GI bleed  -The risks, benefits & alternatives of the procedure were explained to the patient.   -The risks of transesophageal echo include but are not limited to:  Dental trauma, esophageal trauma/perforation, bleeding, laryngospasm/brochospasm, aspiration, sore throat/hoarseness, & dislodgement of the endotracheal tube/nasogastric tube (where applicable).    -The risks of ANES monitored sedation include hypotension, respiratory depression, arrhythmias, bronchospasm, & death.    -Informed consent was obtained. The patient is agreeable to proceed with the procedure and all questions and concerns addressed.    Case discussed with an attending in echocardiography lab.    Jaciel Kingsley MD  [facility]   Cardiovascular Disease PGY-V

## 2023-07-18 ENCOUNTER — HOSPITAL ENCOUNTER (OUTPATIENT)
Facility: HOSPITAL | Age: 73
Discharge: HOME | End: 2023-07-18
Attending: INTERNAL MEDICINE | Admitting: INTERNAL MEDICINE
Payer: MEDICARE

## 2023-07-18 ENCOUNTER — DOCUMENTATION ONLY (OUTPATIENT)
Dept: CARDIOLOGY | Facility: HOSPITAL | Age: 73
End: 2023-07-18
Payer: MEDICARE

## 2023-07-18 ENCOUNTER — HOSPITAL ENCOUNTER (OUTPATIENT)
Dept: CARDIOLOGY | Facility: HOSPITAL | Age: 73
Discharge: HOME | End: 2023-07-18
Attending: INTERNAL MEDICINE | Admitting: INTERNAL MEDICINE
Payer: MEDICARE

## 2023-07-18 ENCOUNTER — ANESTHESIA (OUTPATIENT)
Dept: MEDSURG UNIT | Facility: HOSPITAL | Age: 73
End: 2023-07-18
Payer: MEDICARE

## 2023-07-18 ENCOUNTER — DOCUMENTATION ONLY (OUTPATIENT)
Dept: ELECTROPHYSIOLOGY | Facility: CLINIC | Age: 73
End: 2023-07-18
Payer: MEDICARE

## 2023-07-18 ENCOUNTER — ANESTHESIA EVENT (OUTPATIENT)
Dept: MEDSURG UNIT | Facility: HOSPITAL | Age: 73
End: 2023-07-18
Payer: MEDICARE

## 2023-07-18 VITALS
SYSTOLIC BLOOD PRESSURE: 116 MMHG | HEIGHT: 64 IN | HEART RATE: 70 BPM | WEIGHT: 197 LBS | OXYGEN SATURATION: 100 % | TEMPERATURE: 98 F | RESPIRATION RATE: 18 BRPM | BODY MASS INDEX: 33.63 KG/M2 | DIASTOLIC BLOOD PRESSURE: 69 MMHG

## 2023-07-18 VITALS
BODY MASS INDEX: 33.63 KG/M2 | HEIGHT: 64 IN | SYSTOLIC BLOOD PRESSURE: 122 MMHG | DIASTOLIC BLOOD PRESSURE: 60 MMHG | WEIGHT: 197 LBS

## 2023-07-18 DIAGNOSIS — I48.91 ATRIAL FIBRILLATION: Primary | ICD-10-CM

## 2023-07-18 DIAGNOSIS — I48.19 PERSISTENT ATRIAL FIBRILLATION: ICD-10-CM

## 2023-07-18 DIAGNOSIS — R00.1 SYMPTOMATIC BRADYCARDIA: ICD-10-CM

## 2023-07-18 DIAGNOSIS — Z92.89 HISTORY OF CARDIOVERSION: ICD-10-CM

## 2023-07-18 LAB
ASCENDING AORTA: 3.3 CM
BSA FOR ECHO PROCEDURE: 2.01 M2
EJECTION FRACTION: 55 %
SINUS: 3 CM
STJ: 2.6 CM

## 2023-07-18 PROCEDURE — 25000003 PHARM REV CODE 250: Performed by: NURSE ANESTHETIST, CERTIFIED REGISTERED

## 2023-07-18 PROCEDURE — 93010 EKG 12-LEAD: ICD-10-PCS | Mod: ,,, | Performed by: INTERNAL MEDICINE

## 2023-07-18 PROCEDURE — 93312 TRANSESOPHAGEAL ECHO (TEE) (CUPID ONLY): ICD-10-PCS | Mod: 26,,, | Performed by: INTERNAL MEDICINE

## 2023-07-18 PROCEDURE — 93325 DOPPLER ECHO COLOR FLOW MAPG: CPT | Mod: 26,,, | Performed by: INTERNAL MEDICINE

## 2023-07-18 PROCEDURE — D9220A PRA ANESTHESIA: ICD-10-PCS | Mod: CRNA,,, | Performed by: NURSE ANESTHETIST, CERTIFIED REGISTERED

## 2023-07-18 PROCEDURE — 93005 ELECTROCARDIOGRAM TRACING: CPT | Mod: 59

## 2023-07-18 PROCEDURE — 93325 TRANSESOPHAGEAL ECHO (TEE) (CUPID ONLY): ICD-10-PCS | Mod: 26,,, | Performed by: INTERNAL MEDICINE

## 2023-07-18 PROCEDURE — 92960 PR CARDIOVERSION, ELECTIVE;EXTERN: ICD-10-PCS | Mod: ,,, | Performed by: INTERNAL MEDICINE

## 2023-07-18 PROCEDURE — 93320 DOPPLER ECHO COMPLETE: CPT | Mod: 26,,, | Performed by: INTERNAL MEDICINE

## 2023-07-18 PROCEDURE — 63600175 PHARM REV CODE 636 W HCPCS: Performed by: NURSE ANESTHETIST, CERTIFIED REGISTERED

## 2023-07-18 PROCEDURE — 92960 CARDIOVERSION ELECTRIC EXT: CPT | Performed by: INTERNAL MEDICINE

## 2023-07-18 PROCEDURE — 93325 DOPPLER ECHO COLOR FLOW MAPG: CPT

## 2023-07-18 PROCEDURE — D9220A PRA ANESTHESIA: Mod: CRNA,,, | Performed by: NURSE ANESTHETIST, CERTIFIED REGISTERED

## 2023-07-18 PROCEDURE — 93010 ELECTROCARDIOGRAM REPORT: CPT | Mod: ,,, | Performed by: INTERNAL MEDICINE

## 2023-07-18 PROCEDURE — 93320 TRANSESOPHAGEAL ECHO (TEE) (CUPID ONLY): ICD-10-PCS | Mod: 26,,, | Performed by: INTERNAL MEDICINE

## 2023-07-18 PROCEDURE — D9220A PRA ANESTHESIA: Mod: ANES,,, | Performed by: ANESTHESIOLOGY

## 2023-07-18 PROCEDURE — 37000008 HC ANESTHESIA 1ST 15 MINUTES: Performed by: INTERNAL MEDICINE

## 2023-07-18 PROCEDURE — 93312 ECHO TRANSESOPHAGEAL: CPT | Mod: 26,,, | Performed by: INTERNAL MEDICINE

## 2023-07-18 PROCEDURE — 92960 CARDIOVERSION ELECTRIC EXT: CPT | Mod: ,,, | Performed by: INTERNAL MEDICINE

## 2023-07-18 PROCEDURE — D9220A PRA ANESTHESIA: ICD-10-PCS | Mod: ANES,,, | Performed by: ANESTHESIOLOGY

## 2023-07-18 PROCEDURE — 37000009 HC ANESTHESIA EA ADD 15 MINS: Performed by: INTERNAL MEDICINE

## 2023-07-18 RX ORDER — DIPHENHYDRAMINE HYDROCHLORIDE 50 MG/ML
25 INJECTION INTRAMUSCULAR; INTRAVENOUS EVERY 6 HOURS PRN
Status: CANCELLED | OUTPATIENT
Start: 2023-07-18

## 2023-07-18 RX ORDER — PROPOFOL 10 MG/ML
VIAL (ML) INTRAVENOUS CONTINUOUS PRN
Status: DISCONTINUED | OUTPATIENT
Start: 2023-07-18 | End: 2023-07-18

## 2023-07-18 RX ORDER — FLECAINIDE ACETATE 100 MG/1
100 TABLET ORAL EVERY 12 HOURS
Qty: 60 TABLET | Refills: 6 | Status: SHIPPED | OUTPATIENT
Start: 2023-07-18 | End: 2024-01-24

## 2023-07-18 RX ORDER — HYDROMORPHONE HYDROCHLORIDE 1 MG/ML
0.2 INJECTION, SOLUTION INTRAMUSCULAR; INTRAVENOUS; SUBCUTANEOUS EVERY 5 MIN PRN
Status: CANCELLED | OUTPATIENT
Start: 2023-07-18

## 2023-07-18 RX ORDER — ONDANSETRON 2 MG/ML
4 INJECTION INTRAMUSCULAR; INTRAVENOUS ONCE AS NEEDED
Status: CANCELLED | OUTPATIENT
Start: 2023-07-18 | End: 2034-12-14

## 2023-07-18 RX ORDER — LIDOCAINE HYDROCHLORIDE 10 MG/ML
INJECTION, SOLUTION INTRAVENOUS
Status: DISCONTINUED | OUTPATIENT
Start: 2023-07-18 | End: 2023-07-18

## 2023-07-18 RX ORDER — DILTIAZEM HYDROCHLORIDE 120 MG/1
120 TABLET, FILM COATED ORAL DAILY
Qty: 30 TABLET | Refills: 6 | Status: SHIPPED | OUTPATIENT
Start: 2023-07-18 | End: 2024-01-16

## 2023-07-18 RX ORDER — FENTANYL CITRATE 50 UG/ML
25 INJECTION, SOLUTION INTRAMUSCULAR; INTRAVENOUS EVERY 5 MIN PRN
Status: CANCELLED | OUTPATIENT
Start: 2023-07-18

## 2023-07-18 RX ORDER — PROPOFOL 10 MG/ML
VIAL (ML) INTRAVENOUS
Status: DISCONTINUED | OUTPATIENT
Start: 2023-07-18 | End: 2023-07-18

## 2023-07-18 RX ORDER — FENTANYL CITRATE 50 UG/ML
INJECTION, SOLUTION INTRAMUSCULAR; INTRAVENOUS
Status: DISCONTINUED | OUTPATIENT
Start: 2023-07-18 | End: 2023-07-18

## 2023-07-18 RX ADMIN — FENTANYL CITRATE 100 MCG: 50 INJECTION, SOLUTION INTRAMUSCULAR; INTRAVENOUS at 10:07

## 2023-07-18 RX ADMIN — PROPOFOL 60 MG: 10 INJECTION, EMULSION INTRAVENOUS at 10:07

## 2023-07-18 RX ADMIN — Medication 100 MCG/KG/MIN: at 10:07

## 2023-07-18 RX ADMIN — LIDOCAINE HYDROCHLORIDE 50 MG: 10 INJECTION, SOLUTION INTRAVENOUS at 10:07

## 2023-07-18 RX ADMIN — SODIUM CHLORIDE: 0.9 INJECTION, SOLUTION INTRAVENOUS at 10:07

## 2023-07-18 NOTE — DISCHARGE INSTRUCTIONS
Medications:  -Continue to take your home medications as listed on your medication list after you are discharged.    New Medications:  -Diltiazem 120 mg once daily.  -Flecainide 100 mg two times per day.    Diet  -You may resume oral intake after you are discharged, as long you have no swallowing difficulties.    Because you have received sedation for this procedure:  -Limit activity for the remainder of the day.  -Do not smoke for at least 6 hours and until you are fully awake and alert.  -Do not drink alcoholic beverage for 24 hours.  -Do not drive for 24 hours.  -Defer important decision making until the following day.     Go to the Emergency Department if you develop:   -Bleeding  -Weakness or numbness  -Visual, gait or speech disturbance  -New chest pain, palpitations, shortness of breath, rapid heart beat, or fainting  -Fever    Follow up:  -Send a remote transmission from your device in 1 week. Call the clinic at [phone], option 4, to confirm that your remote transmission was received.  -Dr. Zeke Chance in 1 month.     If you have a pacemaker, defibrillator or loop recorder that is monitored remotely by the [facility], you may be eligible to send in a remote transmission on the day of your EKG appointment in lieu of the EKG. You will be informed at discharge if this is an option. If you are instructed to send in a remote transmission, please send a manual transmission from your home monitor one week after your procedure and then call the clinic at [phone], option 4, to confirm that your remote transmission was received.    Any need to reschedule or cancel procedures, or any questions regarding your procedures should be addressed directly with the Arrhythmia Department Nurses at the following phone number: [phone].

## 2023-07-18 NOTE — ANESTHESIA POSTPROCEDURE EVALUATION
Anesthesia Post Evaluation    Patient: Rosa Maria Vazquez    Procedure(s) Performed: Procedure(s) (LRB):  Cardioversion or Defibrillation (N/A)  Transesophageal echo (TIM) intra-procedure log documentation (N/A)    Final Anesthesia Type: general      Level of consciousness: awake and alert  Post-procedure vital signs: reviewed and stable  Pain control: Pain has been treated.  Airway patency: patent    PONV status: Absent or treated.  Anesthetic complications: no      Cardiovascular status: hemodynamically stable  Respiratory status: unassisted  Hydration status: euvolemic            Vitals Value Taken Time   /70 07/18/23 1146   Temp 36.4 °C (97.5 °F) 07/18/23 1105   Pulse 69 07/18/23 1200   Resp 18 07/18/23 1200   SpO2 100 % 07/18/23 1200   Vitals shown include unvalidated device data.      No case tracking events are documented in the log.      Pain/Vitaly Score: Vitaly Score: 10 (7/18/2023 11:30 AM)

## 2023-07-18 NOTE — NURSING
Received report from GABY Levine and DAJA Tinsley. Patient s/p TIM/ cardioversion, AAOx3. VSS, no c/o pain or discomfort at this time, resp even and unlabored. Post procedure protocol reviewed with patient. Understanding verbalized. RN at bedside.

## 2023-07-18 NOTE — SUBJECTIVE & OBJECTIVE
Past Medical History:   Diagnosis Date    Anticoagulant long-term use     Atrial fibrillation     Hypertension     Thyroid disease        Past Surgical History:   Procedure Laterality Date    A-V CARDIAC PACEMAKER INSERTION N/A 6/19/2023    Procedure: INSERTION, CARDIAC PACEMAKER, DUAL CHAMBER;  Surgeon: Zeke Chance MD;  Location: [facility] EP LAB;  Service: Cardiology;  Laterality: N/A;  SB/AF, Dual PPM with LBAP lead, MDT, MAC, [place], 3 Prep    EYE SURGERY      GASTRIC BYPASS         Review of patient's allergies indicates:   Allergen Reactions    Lopressor [metoprolol tartrate] Anaphylaxis    Ondansetron Other (See Comments)     Double vision         No current facility-administered medications on file prior to encounter.     Current Outpatient Medications on File Prior to Encounter   Medication Sig    alendronate (FOSAMAX) 70 MG tablet TAKE 1 TABLET (70 MG TOTAL) BY MOUTH EVERY 7 DAYS    apixaban (ELIQUIS) 5 mg Tab Take 1 tablet (5 mg total) by mouth 2 (two) times daily.    calcium citrate-vitamin D3 315-200 mg (CITRACAL+D) 315 mg-5 mcg (200 unit) per tablet Take 1 tablet by mouth 2 (two) times daily.    cyanocobalamin 1,000 mcg/mL injection INJECT 1 ML (1,000 MCG TOTAL) INTO THE MUSCLE EVERY 30 DAYS.    EScitalopram oxalate (LEXAPRO) 10 MG tablet Take 1 tablet by mouth once a day (Patient not taking: Reported on 6/19/2023)    famotidine (PEPCID) 20 MG tablet Take 1 tablet (20 mg total) by mouth 2 (two) times daily. (Patient taking differently: Take 20 mg by mouth daily as needed.)    levothyroxine (SYNTHROID) 112 MCG tablet TAKE 1 TABLET BY MOUTH BEFORE BREAKFAST.    lisinopriL 10 MG tablet TAKE 1 TABLET BY MOUTH EVERY DAY    multivitamin capsule Take 1 capsule by mouth once daily.    rosuvastatin (CRESTOR) 5 MG tablet Take 1 tablet (5 mg total) by mouth once daily.     Family History       Problem Relation (Age of Onset)    Diabetes Sister    Heart disease Mother, Father    Protein C deficiency Sister    Protein S  deficiency Sister    Rectal cancer Sister          Tobacco Use    Smoking status: Former     Packs/day: 1.00     Years: 10.00     Pack years: 10.00     Types: Cigarettes     Quit date:      Years since quittin.5    Smokeless tobacco: Never   Substance and Sexual Activity    Alcohol use: Never    Drug use: Never    Sexual activity: Not Currently     Partners: Male     Comment:  (no interest)     Review of Systems   Constitutional: Negative for chills, fever and malaise/fatigue.   HENT:  Negative for congestion and nosebleeds.    Eyes:  Negative for blurred vision.   Cardiovascular:  Negative for chest pain, dyspnea on exertion, irregular heartbeat, leg swelling, near-syncope, orthopnea, palpitations, paroxysmal nocturnal dyspnea and syncope.   Respiratory:  Negative for cough, hemoptysis, shortness of breath, sleep disturbances due to breathing, sputum production and wheezing.    Endocrine: Negative for polyphagia.   Hematologic/Lymphatic: Negative for bleeding problem. Does not bruise/bleed easily.   Skin:  Negative for itching and rash.   Musculoskeletal:  Negative for back pain, joint swelling, muscle cramps and muscle weakness.   Gastrointestinal:  Negative for bloating, abdominal pain, hematemesis, hematochezia, nausea and vomiting.   Genitourinary:  Negative for dysuria and hematuria.   Neurological:  Negative for dizziness, focal weakness, headaches, light-headedness, loss of balance, numbness and weakness.   Psychiatric/Behavioral:  Negative for altered mental status.    Objective:     Vital Signs (Most Recent):    Vital Signs (24h Range):             There is no height or weight on file to calculate BMI.             Physical Exam  Vitals and nursing note reviewed.   Constitutional:       General: She is not in acute distress.     Appearance: Normal appearance. She is well-developed. She is not diaphoretic.   HENT:      Head: Normocephalic and atraumatic.      Mouth/Throat:      Mouth: Mucous  membranes are moist.      Pharynx: No oropharyngeal exudate.   Eyes:      Conjunctiva/sclera: Conjunctivae normal.      Pupils: Pupils are equal, round, and reactive to light.   Cardiovascular:      Rate and Rhythm: Normal rate. Rhythm irregular. No extrasystoles are present.     Pulses: Normal pulses and intact distal pulses.           Radial pulses are 2+ on the right side and 2+ on the left side.        Dorsalis pedis pulses are 2+ on the right side and 2+ on the left side.      Heart sounds: Normal heart sounds, S1 normal and S2 normal. No murmur heard.  Pulmonary:      Effort: Pulmonary effort is normal. No accessory muscle usage or respiratory distress.      Breath sounds: Normal breath sounds. No decreased breath sounds, wheezing, rhonchi or rales.   Chest:      Chest wall: No tenderness.   Abdominal:      General: Bowel sounds are normal. There is no distension.      Palpations: Abdomen is soft.      Tenderness: There is no abdominal tenderness. There is no guarding.   Musculoskeletal:         General: Normal range of motion.      Cervical back: Normal range of motion and neck supple.   Skin:     General: Skin is warm and dry.      Findings: No erythema or rash.   Neurological:      Mental Status: She is alert and oriented to person, place, and time. She is not disoriented.      Sensory: No sensory deficit.      Motor: No abnormal muscle tone.      Coordination: Coordination normal.      Gait: Gait normal.   Psychiatric:         Mood and Affect: Mood normal.         Behavior: Behavior normal.         Thought Content: Thought content normal.         Judgment: Judgment normal.          Significant Labs: Pre procedure labs from     Significant Imaging:  ECG

## 2023-07-18 NOTE — PROGRESS NOTES
Cardiac device interrogation and/or reprogramming completed by industry representative, Zahraa ESTRELLA with [employer] ; please refer to report located in the Media tab.

## 2023-07-18 NOTE — H&P
[facility]  Cardiac Electrophysiology  History and Physical     Admission Date: 7/18/2023  Code Status: Prior   Attending Provider: Zeke Chance MD   Principal Problem:Atrial fibrillation with slow ventricular response    Subjective:     Chief Complaint: Atrial fibrillation with slow ventricular response    HPI: Mrs. Vazquez is a 72 year-old female with a PMHx of hypertension, obesity s/p gastric bypass, hypothyroidism, aortic atherosclerotic disease, and persistent AF with symptomatic bradycardia/slow ventricular response.     History obtained through patient and notes:    AF was incidentally observed on a recent ECG during follow-up with Dr. Li (controlled to bradycardic rates). She endorsed intermittent light-headedness. A holter monitor was ordered which showed persistent AF with ventricular rates at times in the 30s-50s corresponding to symptoms. Average ventricular rate was 44 bpm. She is not on any AV nikole blocking agents. She is referred for consideration for PPM implantation for symptomatic bradycardia. She notes fatigue x 2 months and light-headedness with an episode of near syncope.  An echocardiogram from 2021 noted preserved LV function with mild LA enlargement with impaired relaxation.    During last ast clinic visit 6/8/23, Dr. Chance discussed PPM implantation followed by attempt at rhythm control since AF is newly diagnosed. We discussed the alternatives, benefits and risks of the procedure including pain, infection, bleeding, injury to lung causing pneumothorax requiring tube placement, injury to heart valves, puncture of the heart leading to pericardial effusion or tamponade requiring tube drainage, heart attack, stroke and death. Discussed particulars of LBBPA region implant. She understood and desires to proceed.    6/19/23 Successful implantation of dual chamber PPM with LBBAP. No immediate complications. Doxy x 5 days. Patient presented in AF with slow ventricular  response at 50 bpm. Will plan for TIM/DCCV in 1 month.     Ms. Vazquez presents today to [facility] for scheduled TIM/DCCV with Dr. Chance. She denies any chest pain, palpitations, SOB, FRAZIER, dizziness, light headedness, weakness, syncope, or near syncopal episodes. She denies any bleeding, infections, fevers, rashes, or surgeries in the past 30 days. She is currently taking eliquis 5 mg BID (last dose taken this AM).    ECG today shows V-Paced rhythm at 64 bpm. Device interrogation completed this AM shows underlying rhythm of atrial fibrillation.    Past Medical History:   Diagnosis Date    Anticoagulant long-term use     Atrial fibrillation     Hypertension     Thyroid disease      Past Surgical History:   Procedure Laterality Date    A-V CARDIAC PACEMAKER INSERTION N/A 6/19/2023    Procedure: INSERTION, CARDIAC PACEMAKER, DUAL CHAMBER;  Surgeon: Zeke Chance MD;  Location: [facility];  Service: Cardiology;  Laterality: N/A;  SB/AF, Dual PPM with LBAP lead, MDT, MAC, IL, 3 Prep    EYE SURGERY      GASTRIC BYPASS       Review of patient's allergies indicates:   Allergen Reactions    Lopressor [metoprolol tartrate] Anaphylaxis    Ondansetron Other (See Comments)     Double vision       No current facility-administered medications on file prior to encounter.     Current Outpatient Medications on File Prior to Encounter   Medication Sig    alendronate (FOSAMAX) 70 MG tablet TAKE 1 TABLET (70 MG TOTAL) BY MOUTH EVERY 7 DAYS    apixaban (ELIQUIS) 5 mg Tab Take 1 tablet (5 mg total) by mouth 2 (two) times daily.    calcium citrate-vitamin D3 315-200 mg (CITRACAL+D) 315 mg-5 mcg (200 unit) per tablet Take 1 tablet by mouth 2 (two) times daily.    cyanocobalamin 1,000 mcg/mL injection INJECT 1 ML (1,000 MCG TOTAL) INTO THE MUSCLE EVERY 30 DAYS.    EScitalopram oxalate (LEXAPRO) 10 MG tablet Take 1 tablet by mouth once a day (Patient not taking: Reported on 6/19/2023)    famotidine (PEPCID) 20 MG tablet Take 1 tablet (20 mg  total) by mouth 2 (two) times daily. (Patient taking differently: Take 20 mg by mouth daily as needed.)    levothyroxine (SYNTHROID) 112 MCG tablet TAKE 1 TABLET BY MOUTH BEFORE BREAKFAST.    lisinopriL 10 MG tablet TAKE 1 TABLET BY MOUTH EVERY DAY    multivitamin capsule Take 1 capsule by mouth once daily.    rosuvastatin (CRESTOR) 5 MG tablet Take 1 tablet (5 mg total) by mouth once daily.     Family History       Problem Relation (Age of Onset)    Diabetes Sister    Heart disease Mother, Father    Protein C deficiency Sister    Protein S deficiency Sister    Rectal cancer Sister     Tobacco Use    Smoking status: Former     Packs/day: 1.00     Years: 10.00     Pack years: 10.00     Types: Cigarettes     Quit date:      Years since quittin.5    Smokeless tobacco: Never   Substance and Sexual Activity    Alcohol use: Never    Drug use: Never    Sexual activity: Not Currently     Partners: Male     Comment:  (no interest)     Review of Systems   Constitutional: Negative for chills, fever and malaise/fatigue.   HENT:  Negative for congestion and nosebleeds.    Eyes:  Negative for blurred vision.   Cardiovascular:  Negative for chest pain, dyspnea on exertion, irregular heartbeat, leg swelling, near-syncope, orthopnea, palpitations, paroxysmal nocturnal dyspnea and syncope. Left chest wall healing device site.  Respiratory:  Negative for cough, hemoptysis, shortness of breath, sleep disturbances due to breathing, sputum production and wheezing.    Endocrine: Negative for polyphagia.   Hematologic/Lymphatic: Negative for bleeding problem. Does not bruise/bleed easily.   Skin:  Negative for itching and rash.   Musculoskeletal:  Negative for back pain, joint swelling, muscle cramps and muscle weakness.   Gastrointestinal:  Negative for bloating, abdominal pain, hematemesis, hematochezia, nausea and vomiting.   Genitourinary:  Negative for dysuria and hematuria.   Neurological:  Negative for dizziness,  "focal weakness, headaches, light-headedness, loss of balance, numbness and weakness.   Psychiatric/Behavioral:  Negative for altered mental status.    Objective:     Vital Signs (Most Recent):     Vitals:    07/18/23 0700   BP: 119/68   BP Location: Left arm   Patient Position: Lying   Pulse: 63   Resp: 17   Temp: 98.1 °F (36.7 °C)   SpO2: 100%   Weight: 89.4 kg (197 lb)   Height: 5' 4" (1.626 m)      Vital Signs (24h Range):        Physical Exam  Vitals and nursing note reviewed.   Constitutional:       General: She is not in acute distress.     Appearance: Normal appearance. She is well-developed. She is not diaphoretic.   HENT:      Head: Normocephalic and atraumatic.      Mouth/Throat:      Mouth: Mucous membranes are moist.      Pharynx: No oropharyngeal exudate.   Eyes:      Conjunctiva/sclera: Conjunctivae normal.      Pupils: Pupils are equal, round, and reactive to light.   Cardiovascular:      Rate and Rhythm: Normal rate. Rhythm irregular     Pulses: Normal pulses and intact distal pulses.           Radial pulses are 2+ on the right side and 2+ on the left side.      Heart sounds: Normal heart sounds.  Pulmonary:      Effort: Pulmonary effort is normal. No accessory muscle usage or respiratory distress.      Breath sounds: Normal breath sounds. No decreased breath sounds, wheezing, rhonchi or rales.   Chest:      Chest wall: No tenderness. Device to LUCW. Incision and pocket in good condition.  Abdominal:      General: Bowel sounds are normal. There is no distension.      Palpations: Abdomen is soft.      Tenderness: There is no abdominal tenderness. There is no guarding.   Musculoskeletal:         General: Normal range of motion.      Cervical back: Normal range of motion and neck supple.   Skin:     General: Skin is warm and dry.      Findings: No erythema or rash.   Neurological:      Mental Status: She is alert and oriented to person, place, and time. She is not disoriented.      Sensory: No sensory " deficit.      Motor: No abnormal muscle tone.      Coordination: Coordination normal.      Gait: Gait normal.   Psychiatric:         Mood and Affect: Mood normal.         Behavior: Behavior normal.         Thought Content: Thought content normal.         Judgment: Judgment normal.     Significant Labs: Pre procedure labs from 7/11/23 reviewed.    Significant Imaging:  ECG today shows V-Paced rhythm at 64 bpm. Device interrogation completed this AM shows underlying rhythm of atrial fibrillation.    Assessment and Plan:   Plan:  -TIM/DCCV  -Anesthesia for sedation    Prior to procedure, extensive discussion with patient regarding risks and benefits of DCCV. The patient voices understanding, all questions have been answered, and patient would like to proceed. No further questions/concerns voiced at this time. Consents signed.     Usha Gonzalez NP  Cardiac Electrophysiology  [facility]    Attending: Zeke Chance MD

## 2023-07-18 NOTE — TRANSFER OF CARE
"Anesthesia Transfer of Care Note    Patient: Rosa Maria Vazquez    Procedure(s) Performed: Procedure(s) (LRB):  Cardioversion or Defibrillation (N/A)  Transesophageal echo (TIM) intra-procedure log documentation (N/A)    Patient location: PACU    Anesthesia Type: general    Transport from OR: Transported from OR on 6-10 L/min O2 by face mask with adequate spontaneous ventilation    Post pain: adequate analgesia    Post assessment: no apparent anesthetic complications    Post vital signs: stable    Level of consciousness: awake, alert and oriented    Nausea/Vomiting: no nausea/vomiting    Complications: none    Transfer of care protocol was followed      Last vitals:   Visit Vitals  /68 (BP Location: Left arm, Patient Position: Lying)   Pulse 63   Temp 36.7 °C (98.1 °F)   Resp 17   Ht 5' 4" (1.626 m)   Wt 89.4 kg (197 lb)   SpO2 100%   Breastfeeding No   BMI 33.81 kg/m²     "

## 2023-07-18 NOTE — NURSING
Patient discharged per MD orders. Instructions given on medications, wound care, activity, signs of infection, when to call MD, and follow up appointments. Pt verbalized understanding. Telemetry and PIV removed. Patient transferred from unit via wheelchair by transporter.

## 2023-07-18 NOTE — HPI
Mrs. Vazquez is a 72 year-old female with a PMHx of hypertension, obesity s/p gastric bypass, hypothyroidism, aortic atherosclerotic disease, and persistent AF with symptomatic bradycardia/slow ventricular response.     History obtained through patient and notes:    AF was incidentally observed on a recent ECG during follow-up with Dr. Li (controlled to bradycardic rates). She endorsed intermittent light-headedness. A holter monitor was ordered which showed persistent AF with ventricular rates at times in the 30s-50s corresponding to symptoms. Average ventricular rate was 44 bpm. She is not on any AV nikole blocking agents. She is referred for consideration for PPM implantation for symptomatic bradycardia. She notes fatigue x 2 months and light-headedness with an episode of near syncope.  An echocardiogram from 2021 noted preserved LV function with mild LA enlargement with impaired relaxation.    During last ast clinic visit 6/8/23, Dr. Chance discussed PPM implantation followed by attempt at rhythm control since AF is newly diagnosed. We discussed the alternatives, benefits and risks of the procedure including pain, infection, bleeding, injury to lung causing pneumothorax requiring tube placement, injury to heart valves, puncture of the heart leading to pericardial effusion or tamponade requiring tube drainage, heart attack, stroke and death. Discussed particulars of LBBPA region implant. She understood and desires to proceed.    6/19/23 Successful implantation of dual chamber PPM with LBBAP. No immediate complications. Doxy x 5 days. Patient presented in AF with slow ventricular response at 50 bpm. Will plan for TIM/DCCV in 1 month.     Ms. Vazquez presents today to [facility] for scheduled TIM/DCCV with Dr. Chance. She denies any chest pain, palpitations, SOB, FRAZIER, dizziness, light headedness, weakness, syncope, or near syncopal episodes. She denies any bleeding, infections, fevers, rashes, or surgeries in the past  30 days. She is currently taking eliquis 5 mg BID (last dose taken this AM).    ECG today shows V-Paced rhythm at 64 bpm. Device interrogation completed this AM shows underlying rhythm of atrial fibrillation.

## 2023-07-18 NOTE — ANESTHESIA PREPROCEDURE EVALUATION
07/18/2023  Rosa Maria Vazquez is a 72 y.o., female.  Patient Active Problem List   Diagnosis    Essential hypertension    Acquired hypothyroidism    Age-related osteoporosis without current pathological fracture    History of Naga-en-Y gastric bypass    Stage 3a chronic kidney disease    Vitamin D deficiency    Venous insufficiency    Atherosclerosis of aorta    Persistent atrial fibrillation    Osteopenia    Atrial fibrillation with slow ventricular response    Symptomatic bradycardia           Pre-op Assessment          Review of Systems      Physical Exam    Airway:  No airway management difficulties anticipated  Dental:No active dental issues noted  Chest/Lungs:  Clear to auscultation    Heart:  Rate: Normal  Rhythm: Regular Rhythm  Sounds: Normal        Anesthesia Plan  Type of Anesthesia, risks & benefits discussed:    Anesthesia Type: Gen Natural Airway  Informed Consent: Informed consent signed with the Patient and all parties understand the risks and agree with anesthesia plan.  All questions answered.   ASA Score: 3  Anesthesia Plan Notes: Chart reviewed. Patient seen and examined. Anesthesia plan discussed and questions answered. E-consent signed. Kt Davalos MD    Ready For Surgery From Anesthesia Perspective.     .

## 2023-07-18 NOTE — DISCHARGE SUMMARY
[facility] Cardiac [facility]  Cardiac Electrophysiology  Discharge Summary      Patient Name: Rosa Maria Vazquez  MRN: [id number]  Admission Date: 7/18/2023  Hospital Length of Stay: 0 days  Discharge Date and Time: 7/18/2023  1:22 PM  Attending Physician: Zeke Chance MD  Discharging Provider: Usha Gonzalez NP  Primary Care Physician: Neftaly Lebron MD    HPI: Mrs. Vazquez is a 72 year-old female with a PMHx of hypertension, obesity s/p gastric bypass, hypothyroidism, aortic atherosclerotic disease, and persistent AF with symptomatic bradycardia/slow ventricular response.      History obtained through patient and notes:     AF was incidentally observed on a recent ECG during follow-up with Dr. Li (controlled to bradycardic rates). She endorsed intermittent light-headedness. A holter monitor was ordered which showed persistent AF with ventricular rates at times in the 30s-50s corresponding to symptoms. Average ventricular rate was 44 bpm. She is not on any AV nikole blocking agents. She is referred for consideration for PPM implantation for symptomatic bradycardia. She notes fatigue x 2 months and light-headedness with an episode of near syncope.  An echocardiogram from 2021 noted preserved LV function with mild LA enlargement with impaired relaxation.     During last ast clinic visit 6/8/23, Dr. Chance discussed PPM implantation followed by attempt at rhythm control since AF is newly diagnosed. We discussed the alternatives, benefits and risks of the procedure including pain, infection, bleeding, injury to lung causing pneumothorax requiring tube placement, injury to heart valves, puncture of the heart leading to pericardial effusion or tamponade requiring tube drainage, heart attack, stroke and death. Discussed particulars of LBBPA region implant. She understood and desires to proceed.     6/19/23 Successful implantation of dual chamber PPM with LBBAP. No immediate complications. Doxy x 5 days. Patient  presented in AF with slow ventricular response at 50 bpm. Will plan for TIM/DCCV in 1 month.      Ms. Vazquez presents today to [facility] for scheduled TIM/DCCV with Dr. Chance. She denies any chest pain, palpitations, SOB, FRAZIER, dizziness, light headedness, weakness, syncope, or near syncopal episodes. She denies any bleeding, infections, fevers, rashes, or surgeries in the past 30 days. She is currently taking eliquis 5 mg BID (last dose taken this AM).     ECG today shows V-Paced rhythm at 64 bpm. Device interrogation completed this AM shows underlying rhythm of atrial fibrillation.    Procedure(s) (LRB):  Cardioversion or Defibrillation (N/A)  Transesophageal echo (TIM) intra-procedure log documentation (N/A)     Indwelling Lines/Drains at time of discharge:  Lines/Drains/Airways     None   Hospital Course: Ms. Vazquez presented in AF and currently on eliquis. She underwent TIM without evidence of STEVE thrombus. Proceeded with DCCV 200 J x 1 shock, converting from AF to sinus rhythm. She tolerated the procedure without any acute complications. Post-DCCV ECG revealed AV paced and atrial sensed ventricular paced at 69 bpm  ms  ms QT/Qtc 386/413 ms. Plan to continue all home medications including eliquis. Start flecainide 100 mg BID and diltiazem 120 mg daily. Of note, Ms. Vazquez has taken lopressor (about 12 years ago) and had to discontinue due to FRAZIER while taking. She said this resolved after she discontinued it. Instructed to send remote transmission from device in 1 week and follow up in 4 weeks with Dr. Chance.   Patient was assessed at bedside prior to discharge. She reported feeling well and denied chest discomfort, shortness of breath, palpitations, lightheadedness, or any other acute symptoms. Discharge instructions were discussed with patient and all questions were answered. She was discharged home in stable condition.     Goals of Care Treatment Preferences:  Code Status: Full Code    Consults:  Cardiology TIM    Significant Diagnostic Studies: TIM   Normal left ventricular systolic function. The estimated ejection fraction is 55%.   Normal right ventricular systolic function.   Biatrial enlargement.   Normal appearing left atrial appendage. No thrombus is present in the appendage.   Grade 3 plaque present in the descending aorta.    Final Active Diagnoses:    Diagnosis Date Noted POA    PRINCIPAL PROBLEM:  Atrial fibrillation with slow ventricular response [I48.91] 06/07/2023 Yes      Problems Resolved During this Admission:     Discharged Condition: stable    Disposition: Home or Self Care    Follow Up:   Follow-up Information     HOME MONITOR DEVICE CHECK Follow up in 1 week(s).    Specialty: Cardiology  Why: Post cardioversion           Zeke Chance MD Follow up in 1 month(s).    Specialties: Electrophysiology, Cardiology  Why: Post cardioversion  Contact information:  [address]  [facility] [id number] [phone]                       Patient Instructions:      No driving until:   Order Comments: No driving or operating heavy machinery for 24-48 hours after your procedure because you received sedation.     Other restrictions (specify):   Order Comments: Medications:  -Continue to take your home medications as listed on your medication list after you are discharged.    New Medications:  -Diltiazem 120 mg once daily.  -Flecainide 100 mg two times per day.    Diet  -You may resume oral intake after you are discharged, as long you have no swallowing difficulties.    Because you have received sedation for this procedure:  -Limit activity for the remainder of the day.  -Do not smoke for at least 6 hours and until you are fully awake and alert.  -Do not drink alcoholic beverage for 24 hours.  -Do not drive for 24 hours.  -Defer important decision making until the following day.     Go to the Emergency Department if you develop:   -Bleeding  -Weakness or numbness  -Visual, gait or speech  disturbance  -New chest pain, palpitations, shortness of breath, rapid heart beat, or fainting  -Fever    Follow up:  -Send a remote transmission from your device in 1 week. Call the clinic at [phone], option 4, to confirm that your remote transmission was received.  -Dr. Zeke Chance in 1 month.     If you have a pacemaker, defibrillator or loop recorder that is monitored remotely by the [facility] clinic, you may be eligible to send in a remote transmission on the day of your EKG appointment in lieu of the EKG. You will be informed at discharge if this is an option. If you are instructed to send in a remote transmission, please send a manual transmission from your home monitor one week after your procedure and then call the clinic at [phone], option 4, to confirm that your remote transmission was received.    Any need to reschedule or cancel procedures, or any questions regarding your procedures should be addressed directly with the Arrhythmia Department Nurses at the following phone number: [phone].     Notify your health care provider if you experience any of the following:  increased confusion or weakness     Notify your health care provider if you experience any of the following:  persistent dizziness, light-headedness, or visual disturbances     Notify your health care provider if you experience any of the following:  worsening rash     Notify your health care provider if you experience any of the following:  severe persistent headache     Notify your health care provider if you experience any of the following:  difficulty breathing or increased cough     Notify your health care provider if you experience any of the following:  redness, tenderness, or signs of infection (pain, swelling, redness, odor or green/yellow discharge around incision site)     Notify your health care provider if you experience any of the following:  persistent nausea and vomiting or diarrhea     Notify your health care  provider if you experience any of the following:  temperature >100.4     Notify your health care provider if you experience any of the following:  severe uncontrolled pain     Medications:  Reconciled Home Medications:      Medication List      START taking these medications    diltiaZEM 120 MG tablet  Commonly known as: CARDIZEM  Take 1 tablet (120 mg total) by mouth once daily.     flecainide 100 MG Tab  Commonly known as: TAMBOCOR  Take 1 tablet (100 mg total) by mouth every 12 (twelve) hours.        CONTINUE taking these medications    alendronate 70 MG tablet  Commonly known as: FOSAMAX  TAKE 1 TABLET (70 MG TOTAL) BY MOUTH EVERY 7 DAYS     apixaban 5 mg Tab  Commonly known as: ELIQUIS  Take 1 tablet (5 mg total) by mouth 2 (two) times daily.     calcium citrate-vitamin D3 315-200 mg 315 mg-5 mcg (200 unit) per tablet  Commonly known as: CITRACAL+D  Take 1 tablet by mouth 2 (two) times daily.     cyanocobalamin 1,000 mcg/mL injection  INJECT 1 ML (1,000 MCG TOTAL) INTO THE MUSCLE EVERY 30 DAYS.     famotidine 20 MG tablet  Commonly known as: PEPCID  Take 1 tablet (20 mg total) by mouth 2 (two) times daily.     levothyroxine 112 MCG tablet  Commonly known as: SYNTHROID  TAKE 1 TABLET BY MOUTH BEFORE BREAKFAST.     lisinopriL 10 MG tablet  TAKE 1 TABLET BY MOUTH EVERY DAY     multivitamin capsule  Take 1 capsule by mouth once daily.     rosuvastatin 5 MG tablet  Commonly known as: CRESTOR  Take 1 tablet (5 mg total) by mouth once daily.     tobramycin-dexAMETHasone 0.3-0.1% 0.3-0.1 % Drps  Commonly known as: TOBRADEX  Place 1 drop into the right eye every 4 (four) hours while awake.        ASK your doctor about these medications    EScitalopram oxalate 10 MG tablet  Commonly known as: LEXAPRO  Take 1 tablet by mouth once a day          Plan:  -Start flecainide 100 mg BID  -Start diltiazem 120 mg daily  -Continue all other home medications  -Follow up: send remote transmission from device in 1 week  -Follow up  with Dr. Chance in 1 month    Time spent on the discharge of patient: 15 minutes    Usha Gonzalez NP  Cardiac Electrophysiology  [facility] -Arrhythmia     Attending: Zeke Chance MD

## 2023-07-25 ENCOUNTER — TELEPHONE (OUTPATIENT)
Dept: ELECTROPHYSIOLOGY | Facility: CLINIC | Age: 73
End: 2023-07-25
Payer: MEDICARE

## 2023-07-25 NOTE — TELEPHONE ENCOUNTER
Pt is post DCCV on 7/18/23.  Recurrence of paroxysmal atrial fibrillation is noted.  Ventricular rates appear controlled.  The patient is asymptomatic.  Spoke with patient this am.  Knew she was out of rhythm following DCCV, but has been feeling good since then.  Advised in normal rhythm today.  Compliant with meds.    Started on Flecainide 100mg BID and Diltiazem 120mg QD on 7/18/23.    Last recorded AF episode in device is on 7/19/23.  Max duration of AF episodes is 18hrs on 7/18/23.     Presenting egram today is APVP     Scheduled to see Mandy 8/15/23.

## 2023-08-10 NOTE — PROGRESS NOTES
Ms. Vazquez is a patient of Dr. Chance and was last seen in clinic 6/8/2023.      Subjective:   Patient ID:  Rosa Maria Vazquez is a 72 y.o. female who presents for follow up of Pacemaker Check  .     HPI:    Ms. Vazquez is a 72 y.o. female with HTN, obesity, hypothyroidism, aortic atherosclerosis, persistent AF here for follow up after PPM implantation and cardioversion.    Background:    Referring Cardiologist: Deedee Li MD  Primary Care Physician: Neftaly Lebron MD    Mrs. Vazquez has a history of hypertension, obesity s/p gastric bypass, hypothyroidism, aortic atherosclerotic disease, and persistent AF. She moved to the [place] area a few years ago to be near with family. She established care with Dr. Li. AF was incidentally observed on a recent ECG during follow-up (controlled to bradycardic rates). She endorsed intermittent light-headedness. A holter monitor was ordered which showed persistent AF with ventricular rates at times in the 30s-50s corresponding to symptoms. Average ventricular rate was 44 bpm. She is no on any AV nikole blocking agents. She is referred for consideration for PPM implantation for symptomatic bradycardia. She notes fatigue x 2 months and light-headedness with an episode of near syncope.    An echocardiogram from 2021 noted preserved LV function with mild LA enlargement with impaired relaxation.    ECGs in Epic which are only from May of 2023 which show AF with controlled ventricular rates. QRS is narrow.    My interpretation of today's in clinic ECG is atrial fibrillation with SVR (ventricular rates 45 bpm)  In summary, Mrs. Vazquez is a pleasant 72 year-old woman with hypertension, obesity s/p gastric bypass, hypothyroidism, aortic atherosclerotic disease, and persistent AF with symptomatic bradycardia/slow ventricular response. Discussed PPM implantation followed by attempt at rhythm control since AF is newly diagnosed. We discussed the alternatives, benefits and risks of the  procedure including pain, infection, bleeding, injury to lung causing pneumothorax requiring tube placement, injury to heart valves, puncture of the heart leading to pericardial effusion or tamponade requiring tube drainage, heart attack, stroke and death. Discussed particulars of LBBPA region implant. She understood and desires to proceed. Consent signed.    Plan  dcPPM with LBBPA lead  MDT  Anesthesia  Hold eliquis 48 hours  Will try to schedule ASAP    Update (08/15/2023):    6/19/2023:    Successful implantation of PPM Dual with RV lead placed at the left bundle branch pacing area region.    Complex modifier for implantation requested due to extra-equipment, time and effort required for implantation of the RV lead into the LBBPA region.    7/18/2023: Cardioversion was successfully performed with restoration of normal sinus rhythm. Started on flecainide.     Today she says she feels significantly better since PPM and DCCV. More energy. Can play with her grandchildren. No worsening FRAZIER, CP, palps, LH, syncope reported.    She is currently taking eliquis 5mg BID for stroke prophylaxis and denies significant bleeding episodes. She is currently being treated with flecainide 100mg BID for rhythm control and diltiazem 120mg daily for HR control.  Kidney function is stable, with a creatinine of 1 on 7/11/2023.    Device Interrogation (8/15/2023) reveals an intrinsic SB in 40's with 2:1 AVB noted with stable lead and device function.  LVAT = <75 ms   LBBA pacing lead impedance unipolar 418 Ohms, bipolar 589 Ohms  LBB capture threshold unipolar 1.0 @ 0.4, bipolar 1.25 @ 0.4   EKGs were recorded in bipolar and unipolar pacing configurations at high and low outputs.  EKGs reviewed with Dr. Chance.  5 AT/AF episodes, max duration 18 hrs on 7/18/23.  Last recorded AF episode 7/19/23. No ventricular arrhythmias noted.  She paces 87% in the RA and 94% in the RV (LBBA). Estimated battery longevity 10.8 years.     I have  personally reviewed the patient's EKG today, which shows APVP at 64bpm. VT interval is 230. QRS is 132. QTc is 422.    Relevant Cardiac Test Results:    TIM (7/18/2023):  Normal left ventricular systolic function. The estimated ejection fraction is 55%.  Normal right ventricular systolic function.  Biatrial enlargement.  Normal appearing left atrial appendage. No thrombus is present in the appendage.  Grade 3 plaque present in the descending aorta.    Current Outpatient Medications   Medication Sig    alendronate (FOSAMAX) 70 MG tablet TAKE 1 TABLET (70 MG TOTAL) BY MOUTH EVERY 7 DAYS    apixaban (ELIQUIS) 5 mg Tab Take 1 tablet (5 mg total) by mouth 2 (two) times daily.    calcium citrate-vitamin D3 315-200 mg (CITRACAL+D) 315 mg-5 mcg (200 unit) per tablet Take 1 tablet by mouth 2 (two) times daily.    cyanocobalamin 1,000 mcg/mL injection INJECT 1 ML (1,000 MCG TOTAL) INTO THE MUSCLE EVERY 30 DAYS.    diltiaZEM (CARDIZEM) 120 MG tablet Take 1 tablet (120 mg total) by mouth once daily.    EScitalopram oxalate (LEXAPRO) 10 MG tablet Take 1 tablet by mouth once a day    famotidine (PEPCID) 20 MG tablet Take 1 tablet (20 mg total) by mouth 2 (two) times daily. (Patient taking differently: Take 20 mg by mouth daily as needed.)    flecainide (TAMBOCOR) 100 MG Tab Take 1 tablet (100 mg total) by mouth every 12 (twelve) hours.    levothyroxine (SYNTHROID) 112 MCG tablet TAKE 1 TABLET BY MOUTH BEFORE BREAKFAST.    lisinopriL 10 MG tablet TAKE 1 TABLET BY MOUTH EVERY DAY    multivitamin capsule Take 1 capsule by mouth once daily.    rosuvastatin (CRESTOR) 5 MG tablet Take 1 tablet (5 mg total) by mouth once daily.    tobramycin-dexAMETHasone 0.3-0.1% (TOBRADEX) 0.3-0.1 % DrpS Place 1 drop into the right eye every 4 (four) hours while awake.     No current facility-administered medications for this visit.       Review of Systems   Constitutional: Negative for malaise/fatigue.   Cardiovascular:  Negative for chest pain,  "dyspnea on exertion, irregular heartbeat, leg swelling and palpitations.   Respiratory:  Negative for shortness of breath.    Hematologic/Lymphatic: Negative for bleeding problem.   Skin:  Negative for rash.   Musculoskeletal:  Negative for myalgias.   Gastrointestinal:  Negative for hematemesis, hematochezia and nausea.   Genitourinary:  Negative for hematuria.   Neurological:  Negative for light-headedness.   Psychiatric/Behavioral:  Negative for altered mental status.    Allergic/Immunologic: Negative for persistent infections.       Objective:          /61   Pulse 64   Ht 5' 4" (1.626 m)   Wt 88.1 kg (194 lb 3.6 oz)   BMI 33.34 kg/m²     Physical Exam  Vitals and nursing note reviewed.   Constitutional:       Appearance: Normal appearance. She is well-developed.   HENT:      Head: Normocephalic.      Nose: Nose normal.   Eyes:      Pupils: Pupils are equal, round, and reactive to light.   Cardiovascular:      Rate and Rhythm: Normal rate and regular rhythm.   Pulmonary:      Effort: No respiratory distress.      Breath sounds: Normal breath sounds.   Chest:      Comments: Device to LUCW. Incision and pocket in good repair.    Musculoskeletal:         General: Normal range of motion.   Skin:     General: Skin is warm and dry.      Findings: No erythema.   Neurological:      Mental Status: She is alert and oriented to person, place, and time.   Psychiatric:         Speech: Speech normal.         Behavior: Behavior normal.           Lab Results   Component Value Date     07/11/2023    K 4.7 07/11/2023    MG 2.3 11/19/2021    BUN 22 07/11/2023    CREATININE 1.0 07/11/2023    ALT 19 05/23/2023    AST 24 05/23/2023    HGB 13.3 07/11/2023    HCT 41.5 07/11/2023    TSH 1.014 05/23/2023    LDLCALC 59.2 (L) 05/23/2023       Recent Labs   Lab 06/19/23  0755 07/11/23  0853   INR 1.0 1.0       Assessment:     1. Cardiac pacemaker in situ - LBBA lead    2. Atrial fibrillation with slow ventricular response  "   3. Essential hypertension    4. Stage 3a chronic kidney disease      Plan:     In summary, Ms. Vazquez is a 72 y.o. female with HTN, obesity, hypothyroidism, aortic atherosclerosis, persistent AF here for follow up after PPM implantation and cardioversion.  She is now 2 mo s/p PPM implantation with LBBA pacing lead. She subsequently was cardioverted 1 mo ago and placed on flecainide 100mg BID. TIM EF 55%.  Ms. Vazquez is doing well from a device perspective with stable lead and device function. 94% LBBA pacing. LVAT <75. LBBA impedence and threshold stable in unipolar and bipolar. ECGs in bipolar and unipolar reviewed with Dr. Chance. No AF since [facility].  CHADSVASc 3 on eliquis. She is feeling significant symptom improvement.     Continue current medication regimen and device settings.   Follow up in device clinic as scheduled.   Follow up in EP clinic in 6 mo, sooner as needed.     *A copy of this note has been sent to Dr. Chance*    Follow up in about 6 months (around 2/15/2024).    ------------------------------------------------------------------    ARIANE Campos, NP-C  Cardiac Electrophysiology

## 2023-08-14 ENCOUNTER — TELEPHONE (OUTPATIENT)
Dept: ELECTROPHYSIOLOGY | Facility: CLINIC | Age: 73
End: 2023-08-14
Payer: MEDICARE

## 2023-08-15 ENCOUNTER — CLINICAL SUPPORT (OUTPATIENT)
Dept: CARDIOLOGY | Facility: HOSPITAL | Age: 73
End: 2023-08-15
Attending: INTERNAL MEDICINE
Payer: MEDICARE

## 2023-08-15 ENCOUNTER — OFFICE VISIT (OUTPATIENT)
Dept: ELECTROPHYSIOLOGY | Facility: CLINIC | Age: 73
End: 2023-08-15
Payer: MEDICARE

## 2023-08-15 VITALS
DIASTOLIC BLOOD PRESSURE: 61 MMHG | HEART RATE: 64 BPM | HEIGHT: 64 IN | BODY MASS INDEX: 33.16 KG/M2 | WEIGHT: 194.25 LBS | SYSTOLIC BLOOD PRESSURE: 112 MMHG

## 2023-08-15 DIAGNOSIS — N18.31 STAGE 3A CHRONIC KIDNEY DISEASE: ICD-10-CM

## 2023-08-15 DIAGNOSIS — Z95.0 CARDIAC PACEMAKER IN SITU: Primary | ICD-10-CM

## 2023-08-15 DIAGNOSIS — I10 ESSENTIAL HYPERTENSION: ICD-10-CM

## 2023-08-15 DIAGNOSIS — I48.19 PERSISTENT ATRIAL FIBRILLATION: ICD-10-CM

## 2023-08-15 DIAGNOSIS — I48.91 ATRIAL FIBRILLATION WITH SLOW VENTRICULAR RESPONSE: ICD-10-CM

## 2023-08-15 DIAGNOSIS — R00.1 SYMPTOMATIC BRADYCARDIA: ICD-10-CM

## 2023-08-15 PROCEDURE — 93280 CARDIAC DEVICE CHECK - IN CLINIC & HOSPITAL: ICD-10-PCS | Mod: 26,,, | Performed by: INTERNAL MEDICINE

## 2023-08-15 PROCEDURE — 93010 RHYTHM STRIP: ICD-10-PCS | Mod: S$PBB,,, | Performed by: INTERNAL MEDICINE

## 2023-08-15 PROCEDURE — 99214 OFFICE O/P EST MOD 30 MIN: CPT | Mod: S$PBB,,, | Performed by: NURSE PRACTITIONER

## 2023-08-15 PROCEDURE — 93010 ELECTROCARDIOGRAM REPORT: CPT | Mod: S$PBB,,, | Performed by: INTERNAL MEDICINE

## 2023-08-15 PROCEDURE — 93280 PM DEVICE PROGR EVAL DUAL: CPT | Mod: 26,,, | Performed by: INTERNAL MEDICINE

## 2023-08-15 PROCEDURE — 99999 PR PBB SHADOW E&M-EST. PATIENT-LVL IV: ICD-10-PCS | Mod: PBBFAC,,, | Performed by: NURSE PRACTITIONER

## 2023-08-15 PROCEDURE — 99214 PR OFFICE/OUTPT VISIT, EST, LEVL IV, 30-39 MIN: ICD-10-PCS | Mod: S$PBB,,, | Performed by: NURSE PRACTITIONER

## 2023-08-15 PROCEDURE — 99214 OFFICE O/P EST MOD 30 MIN: CPT | Mod: PBBFAC | Performed by: NURSE PRACTITIONER

## 2023-08-15 PROCEDURE — 93005 ELECTROCARDIOGRAM TRACING: CPT | Mod: PBBFAC | Performed by: INTERNAL MEDICINE

## 2023-08-15 PROCEDURE — 93280 PM DEVICE PROGR EVAL DUAL: CPT

## 2023-08-15 PROCEDURE — 99999 PR PBB SHADOW E&M-EST. PATIENT-LVL IV: CPT | Mod: PBBFAC,,, | Performed by: NURSE PRACTITIONER

## 2023-08-20 PROBLEM — M85.80 OSTEOPENIA: Status: RESOLVED | Noted: 2023-05-21 | Resolved: 2023-08-20

## 2023-08-20 NOTE — PROGRESS NOTES
[facility] Progress Note  8/24/2023          Reason for Visit:      had concerns including Follow-up and Atrial Fibrillation.       History of Present Illness:     Pt is here today for a check up  She has no new complaints today    Afib/Aflutter  Eliquis 5 mg bid  Flecainide 100 bid  Cardizem 120 daily  Since last visit, patient had TIM then cardioversion and has remained in NSR since.  She also had pacemaker placed because of the slow ventricular response/ symptomatic bradycardia.  She feels much better after getting the pacemaker and is not having any more of the presyncope/syncope.    Hypothyroidism  Levothyroxine 112  Normal Tsh and Free T4 in may  Plan follow up in November and will repeat labs then    Osteoporosis   Alendronate 70mg  Dexa done 12/2022 showed T score -2.5.  Started alendronate then.  Takes calcium and a vitamin D supplement.    No side effects  Plan repeat DEXA in 12/2024     CKD3a  Her kidney function has been mildly reduced in the stage 3a range.    Stable on recent labs     HTN  Lisinopril 10 mg daily  Cardizem 120 daily  Blood pressure has been controlled on current meds        Aortic atherosclerosis  Crestor 5 mg   PT saw vascular surgery for venous insufficiency and they noted some plaque.  She is on crestor and tolerating.    HM  Agreeable to get Prevnar-20 today  Encouraged flu shot, covid booster and RSV vaccine when available at pharmacy this fall.  She did get 2 doses of shingrix though we only have 1 date.         Problem List:     Patient Active Problem List   Diagnosis    Essential hypertension    Acquired hypothyroidism    Osteoporosis    History of Naga-en-Y gastric bypass    Stage 3a chronic kidney disease    Vitamin D deficiency    Venous insufficiency    Atherosclerosis of aorta    Persistent atrial fibrillation    Atrial fibrillation with slow ventricular response    Symptomatic bradycardia    Cardiac pacemaker in situ - LBBA lead         Medications:        Current Outpatient Medications:     alendronate (FOSAMAX) 70 MG tablet, TAKE 1 TABLET (70 MG TOTAL) BY MOUTH EVERY 7 DAYS, Disp: 4 tablet, Rfl: 11    apixaban (ELIQUIS) 5 mg Tab, Take 1 tablet (5 mg total) by mouth 2 (two) times daily., Disp: 180 tablet, Rfl: 3    calcium citrate-vitamin D3 315-200 mg (CITRACAL+D) 315 mg-5 mcg (200 unit) per tablet, Take 1 tablet by mouth 2 (two) times daily., Disp: , Rfl:     cyanocobalamin 1,000 mcg/mL injection, INJECT 1 ML (1,000 MCG TOTAL) INTO THE MUSCLE EVERY 30 DAYS., Disp: 3 mL, Rfl: 3    diltiaZEM (CARDIZEM) 120 MG tablet, Take 1 tablet (120 mg total) by mouth once daily., Disp: 30 tablet, Rfl: 6    famotidine (PEPCID) 20 MG tablet, Take 1 tablet (20 mg total) by mouth 2 (two) times daily. (Patient taking differently: Take 20 mg by mouth daily as needed.), Disp: 60 tablet, Rfl: 3    flecainide (TAMBOCOR) 100 MG Tab, Take 1 tablet (100 mg total) by mouth every 12 (twelve) hours., Disp: 60 tablet, Rfl: 6    levothyroxine (SYNTHROID) 112 MCG tablet, TAKE 1 TABLET BY MOUTH BEFORE BREAKFAST., Disp: 30 tablet, Rfl: 11    lisinopriL 10 MG tablet, TAKE 1 TABLET BY MOUTH EVERY DAY, Disp: 90 tablet, Rfl: 3    multivitamin capsule, Take 1 capsule by mouth once daily., Disp: , Rfl:     rosuvastatin (CRESTOR) 5 MG tablet, Take 1 tablet (5 mg total) by mouth once daily., Disp: 90 tablet, Rfl: 3    tobramycin-dexAMETHasone 0.3-0.1% (TOBRADEX) 0.3-0.1 % DrpS, Place 1 drop into the right eye every 4 (four) hours while awake., Disp: 10 mL, Rfl: 0        Review of Systems:     Review of Systems   Constitutional:  Negative for chills and fever.   HENT:  Negative for ear pain and sore throat.    Respiratory:  Negative for cough, shortness of breath and wheezing.    Cardiovascular:  Negative for chest pain and palpitations.   Gastrointestinal:  Negative for constipation, diarrhea, nausea and vomiting.   Genitourinary:  Negative for dysuria and hematuria.   Musculoskeletal:  Negative for  "joint swelling and joint deformity.   Neurological:  Negative for dizziness and weakness.           Physical Exam:     Temp:             Blood Pressure:  124/76        Pulse:   89     Respirations:  18  Weight:   86 kg (189 lb 9.5 oz)  Height:   5' 4" (1.626 m)  BMI:     Body mass index is 32.54 kg/m².    Physical Exam  Constitutional:       General: She is not in acute distress.  HENT:      Right Ear: Tympanic membrane normal.      Left Ear: Tympanic membrane normal.      Nose: No congestion or rhinorrhea.      Mouth/Throat:      Pharynx: No oropharyngeal exudate or posterior oropharyngeal erythema.   Cardiovascular:      Rate and Rhythm: Normal rate and regular rhythm.   Pulmonary:      Effort: Pulmonary effort is normal. No respiratory distress.      Breath sounds: No wheezing.   Abdominal:      Palpations: Abdomen is soft.      Tenderness: There is no abdominal tenderness.   Skin:     General: Skin is warm.   Neurological:      General: No focal deficit present.      Mental Status: She is alert and oriented to person, place, and time.           Labs/Imaging/Testing:       Most recent CBC:  Lab Results   Component Value Date    WBC 3.92 07/11/2023    HGB 13.3 07/11/2023     07/11/2023    MCV 86 07/11/2023       Most recent Lipids:  Lab Results   Component Value Date    CHOL 121 05/23/2023    HDL 51 05/23/2023    LDLCALC 59.2 (L) 05/23/2023    TRIG 54 05/23/2023       Most recent Chemistry:  Lab Results   Component Value Date     07/11/2023    K 4.7 07/11/2023     07/11/2023    CO2 23 07/11/2023    BUN 22 07/11/2023    CREATININE 1.0 07/11/2023    GLU 78 07/11/2023    CALCIUM 10.0 07/11/2023    ALT 19 05/23/2023    AST 24 05/23/2023    ALKPHOS 66 05/23/2023    PROT 7.0 05/23/2023    ALBUMIN 3.8 05/23/2023       Other tests:  Lab Results   Component Value Date    TSH 1.014 05/23/2023    FREET4 1.10 05/23/2023    INR 1.0 07/11/2023    HGBA1C 5.5 05/23/2023    FERRITIN 30 11/19/2021    QXDLFBQS20 " 1186 (H) 11/19/2021    [id number] 51 11/19/2021    MG 2.3 11/19/2021       Assessment and Plan:     1. Chronic atrial fibrillation  Stable, follow up if no improvement or worsening    2. Acquired hypothyroidism  Stable, recheck labs in November    3. Osteoporosis, unspecified osteoporosis type, unspecified pathological fracture presence  Continue alendroante, repeat BMD in 12/2024    4. Stage 3a chronic kidney disease  Stable on current meds    5. Essential hypertension  Stable, controlled on current meds    6. Atherosclerosis of aorta  Continue crestor    RTC 3 mos or sooner mary Lebron MD  8/24/2023    This note was prepared using voice-recognition software.  Although efforts are made to proofread the note, some errors may persist in the final document.

## 2023-08-24 ENCOUNTER — OFFICE VISIT (OUTPATIENT)
Dept: PRIMARY CARE CLINIC | Facility: CLINIC | Age: 73
End: 2023-08-24
Payer: MEDICARE

## 2023-08-24 VITALS
RESPIRATION RATE: 18 BRPM | HEART RATE: 89 BPM | DIASTOLIC BLOOD PRESSURE: 76 MMHG | HEIGHT: 64 IN | BODY MASS INDEX: 32.37 KG/M2 | OXYGEN SATURATION: 97 % | WEIGHT: 189.63 LBS | SYSTOLIC BLOOD PRESSURE: 124 MMHG

## 2023-08-24 DIAGNOSIS — E03.9 ACQUIRED HYPOTHYROIDISM: ICD-10-CM

## 2023-08-24 DIAGNOSIS — I70.0 ATHEROSCLEROSIS OF AORTA: ICD-10-CM

## 2023-08-24 DIAGNOSIS — M81.0 OSTEOPOROSIS, UNSPECIFIED OSTEOPOROSIS TYPE, UNSPECIFIED PATHOLOGICAL FRACTURE PRESENCE: ICD-10-CM

## 2023-08-24 DIAGNOSIS — N18.31 STAGE 3A CHRONIC KIDNEY DISEASE: ICD-10-CM

## 2023-08-24 DIAGNOSIS — I10 ESSENTIAL HYPERTENSION: ICD-10-CM

## 2023-08-24 DIAGNOSIS — I48.20 CHRONIC ATRIAL FIBRILLATION: Primary | ICD-10-CM

## 2023-08-24 PROCEDURE — 99999 PR PBB SHADOW E&M-EST. PATIENT-LVL III: CPT | Mod: PBBFAC,,, | Performed by: INTERNAL MEDICINE

## 2023-08-24 PROCEDURE — 90677 PCV20 VACCINE IM: CPT | Mod: PBBFAC,PN

## 2023-08-24 PROCEDURE — 99214 OFFICE O/P EST MOD 30 MIN: CPT | Mod: S$PBB,,, | Performed by: INTERNAL MEDICINE

## 2023-08-24 PROCEDURE — 99999 PR PBB SHADOW E&M-EST. PATIENT-LVL III: ICD-10-PCS | Mod: PBBFAC,,, | Performed by: INTERNAL MEDICINE

## 2023-08-24 PROCEDURE — 99214 PR OFFICE/OUTPT VISIT, EST, LEVL IV, 30-39 MIN: ICD-10-PCS | Mod: S$PBB,,, | Performed by: INTERNAL MEDICINE

## 2023-08-24 PROCEDURE — 99999PBSHW PNEUMOCOCCAL CONJUGATE VACCINE 20-VALENT: Mod: PBBFAC,,,

## 2023-08-24 PROCEDURE — 99999PBSHW PNEUMOCOCCAL CONJUGATE VACCINE 20-VALENT: ICD-10-PCS | Mod: PBBFAC,,,

## 2023-08-24 PROCEDURE — 99213 OFFICE O/P EST LOW 20 MIN: CPT | Mod: PBBFAC,PN | Performed by: INTERNAL MEDICINE

## 2023-08-31 ENCOUNTER — OFFICE VISIT (OUTPATIENT)
Dept: INTERNAL MEDICINE | Facility: CLINIC | Age: 73
End: 2023-08-31
Payer: MEDICARE

## 2023-08-31 VITALS
OXYGEN SATURATION: 98 % | HEART RATE: 90 BPM | HEIGHT: 64 IN | SYSTOLIC BLOOD PRESSURE: 120 MMHG | WEIGHT: 196.63 LBS | BODY MASS INDEX: 33.57 KG/M2 | DIASTOLIC BLOOD PRESSURE: 82 MMHG

## 2023-08-31 DIAGNOSIS — K12.1 ORAL ULCER: Primary | ICD-10-CM

## 2023-08-31 PROCEDURE — 99213 OFFICE O/P EST LOW 20 MIN: CPT | Mod: PBBFAC | Performed by: INTERNAL MEDICINE

## 2023-08-31 PROCEDURE — 99213 OFFICE O/P EST LOW 20 MIN: CPT | Mod: S$PBB,,, | Performed by: INTERNAL MEDICINE

## 2023-08-31 PROCEDURE — 99999 PR PBB SHADOW E&M-EST. PATIENT-LVL III: ICD-10-PCS | Mod: PBBFAC,,, | Performed by: INTERNAL MEDICINE

## 2023-08-31 PROCEDURE — 99213 PR OFFICE/OUTPT VISIT, EST, LEVL III, 20-29 MIN: ICD-10-PCS | Mod: S$PBB,,, | Performed by: INTERNAL MEDICINE

## 2023-08-31 PROCEDURE — 99999 PR PBB SHADOW E&M-EST. PATIENT-LVL III: CPT | Mod: PBBFAC,,, | Performed by: INTERNAL MEDICINE

## 2023-08-31 NOTE — PROGRESS NOTES
Subjective:       Patient ID: Rosa Maria Vazquez is a 72 y.o. female.    Chief Complaint: Mouth Lesions      HPI  Rosa Maria Vazquez is a 72 y.o. year old female presents for lip swelling and infection of upper left lip after dental procedure. Had dental work done this past Monday. Noticed lip swelling the day after procedure. Called dentist, told to use self remedies. Has improved with salt water gargles and hot tea. Denies any fever, chills. Did notice fibrinous material in mouth yesterday, scheduled an appointment for evaluation.     Review of Systems   HENT:  Positive for mouth sores.        Past Medical History:   Diagnosis Date    Anticoagulant long-term use     Atrial fibrillation     Hypertension     Thyroid disease         Prior to Admission medications    Medication Sig Start Date End Date Taking? Authorizing Provider   alendronate (FOSAMAX) 70 MG tablet TAKE 1 TABLET (70 MG TOTAL) BY MOUTH EVERY 7 DAYS 5/29/23 5/28/24 Yes Neftaly Lebron MD   apixaban (ELIQUIS) 5 mg Tab Take 1 tablet (5 mg total) by mouth 2 (two) times daily. 5/11/23  Yes Deedee Li MD   calcium citrate-vitamin D3 315-200 mg (CITRACAL+D) 315 mg-5 mcg (200 unit) per tablet Take 1 tablet by mouth 2 (two) times daily.   Yes Provider, Historical   cyanocobalamin 1,000 mcg/mL injection INJECT 1 ML (1,000 MCG TOTAL) INTO THE MUSCLE EVERY 30 DAYS. 10/18/22  Yes Torrie Wright DNP   diltiaZEM (CARDIZEM) 120 MG tablet Take 1 tablet (120 mg total) by mouth once daily. 7/18/23  Yes Usha Gonzalez NP   famotidine (PEPCID) 20 MG tablet Take 1 tablet (20 mg total) by mouth 2 (two) times daily.  Patient taking differently: Take 20 mg by mouth daily as needed. 9/1/22 9/1/23 Yes Neftaly Lebron MD   flecainide (TAMBOCOR) 100 MG Tab Take 1 tablet (100 mg total) by mouth every 12 (twelve) hours. 7/18/23  Yes Usha Gonzalez NP   levothyroxine (SYNTHROID) 112 MCG tablet TAKE 1 TABLET BY MOUTH BEFORE BREAKFAST. 10/18/22  Yes Torrie Wright,  "DNP   lisinopriL 10 MG tablet TAKE 1 TABLET BY MOUTH EVERY DAY 6/12/23  Yes Neftaly Lebron MD   multivitamin capsule Take 1 capsule by mouth once daily.   Yes Provider, Historical   rosuvastatin (CRESTOR) 5 MG tablet Take 1 tablet (5 mg total) by mouth once daily. 5/11/23  Yes Deedee Li MD   tobramycin-dexAMETHasone 0.3-0.1% (TOBRADEX) 0.3-0.1 % DrpS Place 1 drop into the right eye every 4 (four) hours while awake.  Patient not taking: Reported on 8/31/2023 7/10/23   Que Hartman MD        Past medical history, surgical history, and family medical history reviewed and updated as appropriate.    Medications and allergies reviewed.     Objective:          Vitals:    08/31/23 1316   BP: 120/82   BP Location: Right arm   Patient Position: Sitting   Pulse: 90   SpO2: 98%   Weight: 89.2 kg (196 lb 10.4 oz)   Height: 5' 4" (1.626 m)     Body mass index is 33.76 kg/m².  Physical Exam  HENT:      Mouth/Throat:      Comments: Ulcer on inner left upper lip with fibrinous material.   No lip swelling  No induration palpated  No skin changes  No lymphadenopathy    Lab Results   Component Value Date    WBC 3.92 07/11/2023    HGB 13.3 07/11/2023    HCT 41.5 07/11/2023     07/11/2023    CHOL 121 05/23/2023    TRIG 54 05/23/2023    HDL 51 05/23/2023    ALT 19 05/23/2023    AST 24 05/23/2023     07/11/2023    K 4.7 07/11/2023     07/11/2023    CREATININE 1.0 07/11/2023    BUN 22 07/11/2023    CO2 23 07/11/2023    TSH 1.014 05/23/2023    INR 1.0 07/11/2023    HGBA1C 5.5 05/23/2023       Assessment:       1. Oral ulcer          Plan:     Rosa Maria was seen today for mouth lesions.    Diagnoses and all orders for this visit:    Oral ulcer  Comments:  left side upper inner lip. appears to be stomatitis. lip swelling has been resolving. No reported bad taste in mouth. Did have dental work done 3 days ago.    Did salt water gargles yesterday and today with improvement of lip swelling.     Reassurance " provided.      Follow up if symptoms worsen or fail to improve.    Adalid Bar MD  Internal Medicine / Primary Care  [facility]  8/31/2023

## 2023-09-08 DIAGNOSIS — I48.0 PAROXYSMAL ATRIAL FIBRILLATION: Primary | ICD-10-CM

## 2023-09-18 ENCOUNTER — OFFICE VISIT (OUTPATIENT)
Dept: CARDIOLOGY | Facility: CLINIC | Age: 73
End: 2023-09-18
Payer: MEDICARE

## 2023-09-18 ENCOUNTER — CLINICAL SUPPORT (OUTPATIENT)
Dept: CARDIOLOGY | Facility: HOSPITAL | Age: 73
End: 2023-09-18
Payer: MEDICARE

## 2023-09-18 VITALS
SYSTOLIC BLOOD PRESSURE: 110 MMHG | DIASTOLIC BLOOD PRESSURE: 73 MMHG | BODY MASS INDEX: 32.27 KG/M2 | RESPIRATION RATE: 20 BRPM | WEIGHT: 189 LBS | HEIGHT: 64 IN | HEART RATE: 85 BPM

## 2023-09-18 DIAGNOSIS — I48.0 PAROXYSMAL ATRIAL FIBRILLATION: Primary | ICD-10-CM

## 2023-09-18 DIAGNOSIS — I10 ESSENTIAL HYPERTENSION: ICD-10-CM

## 2023-09-18 DIAGNOSIS — Z95.0 PRESENCE OF CARDIAC PACEMAKER: ICD-10-CM

## 2023-09-18 DIAGNOSIS — I87.2 VENOUS INSUFFICIENCY: ICD-10-CM

## 2023-09-18 DIAGNOSIS — Z95.0 CARDIAC PACEMAKER IN SITU: ICD-10-CM

## 2023-09-18 DIAGNOSIS — R00.1 SYMPTOMATIC BRADYCARDIA: ICD-10-CM

## 2023-09-18 DIAGNOSIS — I70.0 ATHEROSCLEROSIS OF AORTA: ICD-10-CM

## 2023-09-18 PROBLEM — I48.91 ATRIAL FIBRILLATION WITH SLOW VENTRICULAR RESPONSE: Status: RESOLVED | Noted: 2023-06-07 | Resolved: 2023-09-18

## 2023-09-18 PROCEDURE — 99214 PR OFFICE/OUTPT VISIT, EST, LEVL IV, 30-39 MIN: ICD-10-PCS | Mod: S$PBB,,, | Performed by: INTERNAL MEDICINE

## 2023-09-18 PROCEDURE — 99999 PR PBB SHADOW E&M-EST. PATIENT-LVL III: CPT | Mod: PBBFAC,,, | Performed by: INTERNAL MEDICINE

## 2023-09-18 PROCEDURE — 93294 REM INTERROG EVL PM/LDLS PM: CPT | Mod: ,,, | Performed by: INTERNAL MEDICINE

## 2023-09-18 PROCEDURE — 99999 PR PBB SHADOW E&M-EST. PATIENT-LVL III: ICD-10-PCS | Mod: PBBFAC,,, | Performed by: INTERNAL MEDICINE

## 2023-09-18 PROCEDURE — 99213 OFFICE O/P EST LOW 20 MIN: CPT | Mod: PBBFAC | Performed by: INTERNAL MEDICINE

## 2023-09-18 PROCEDURE — 99214 OFFICE O/P EST MOD 30 MIN: CPT | Mod: S$PBB,,, | Performed by: INTERNAL MEDICINE

## 2023-09-18 PROCEDURE — 93294 CARDIAC DEVICE CHECK - REMOTE: ICD-10-PCS | Mod: ,,, | Performed by: INTERNAL MEDICINE

## 2023-09-18 RX ORDER — ROSUVASTATIN CALCIUM 10 MG/1
10 TABLET, COATED ORAL DAILY
Qty: 90 TABLET | Refills: 3 | Status: SHIPPED | OUTPATIENT
Start: 2023-09-18 | End: 2024-03-20 | Stop reason: SDUPTHER

## 2023-09-18 NOTE — PROGRESS NOTES
HISTORY:    72-year-old female with a history of paroxysmal atrial fibrillation, status post pacemaker 2023 for symptomatic bradycardia, hypertension, venous insufficiency, recurrent right lower extremity cellulitis, hypothyroidism, obesity s/p gastric bypass '03 and intraocular migraines presenting for follow-up.    Atrial fibrillation incidentally found on ecg in early '23 and then on further questioning and holter monitor appeared to have symptomatic bradycardia.  Now status post pacemaker placement.    Patient denies chest pain, shortness of breath, or dyspnea on exertion at this time feels much improved post pacemaker implant.    Activity levels improved. Walking 3x/week 2-4 miles.     B LE edema for years along with chronic hyperpigmentation. No h/o VTE. Has a h/o recurrent cellulitits. Pt used to weigh over 400 pounds and had a significant improvement in LE symptoms when she lost weight around 20 years ago. She currently uses compression stockings daily. Help significantly with symptoms.    She tolerates diltiazem 120 x 1, flecainide 100 x 2, lisinopril 10 x 1, rosuvastatin 5 x 1, and apixaban 5 x 2. Bps controlled on home log.     The patient denies any previous history of myocardial infarction, coronary artery disease, peripheral arterial disease, stroke, congestive heart failure, or cardiomyopathy.    Patient moved here in '22 from [place]. She is a retired ICU nurse. She moved here to be with her children and grandkids who are [employer] graduates.      PHYSICAL EXAM:    Vitals:    09/18/23 0831   BP: 110/73   Pulse: 85   Resp: 20     NAD, A+Ox3.  No jvd, no bruit.  Irreg, irreg, nml s1,s2. No murmurs.  CTA B no wheezes or crackles.  2+ B radial and 1+ B DP/PT. B LE edema mild with chronic lipodermatosclerosis L>R. No erythema or TTP. No open wounds.    LABS/STUDIES (imaging reviewed during clinic visit):    July 2023 CBC and CMP normal.  LDL 59 and HDL 51.  Triglycerides 54.  A1c and TSH normal.     EKG August 2023 av dual paced.  May 2023 atrial fibrillation. No Qs/Sts.   Device check August 2023 87% RA 94% RV pacing.  AFib episode July 19th with no ventricular arrhythmias.  Holter May 2023 atrial fibrillation with average heart rate of 44 beats per minute.  Symptomatic episodes corresponding with heart rates in the range of 35-55.  Slowest ventricular response was 30 beats per minute and the longest pause was 3 seconds, none of which corresponded to symptoms.  TTE 2021 normal LV size and function with EF 65%.  Grade 1 diastology.  Basal septal hypertrophy with mild Marvel no significant obstruction.  CVP 3.  TIM July 2023 Nml LVEF w no significant valve ds. No LA appendage clot s/p CV.   Venous duplex December 2022 shows no evidence of DVT bilaterally.  Bilateral GSV reflux is present. L GSV SVT.   Carotid May 2023 No significant ICA ds bilaterally.     ASSESSMENT & PLAN:    1. Paroxysmal atrial fibrillation    2. Essential hypertension    3. Cardiac pacemaker in situ - LBBA lead    4. Atherosclerosis of aorta    5. Symptomatic bradycardia    6. Venous insufficiency              Orders Placed This Encounter    rosuvastatin (CRESTOR) 10 MG tablet          Incidental diagnosis of afib likely long standing. CHADSVASC 4 and tolerating apixiban 5x2. On diltiazem and flecainide.     Symptomatic bradycardia off AVN blockers s/p PM implant.     Aortic atherosclerosis noted and tolerating rosuvastatin 5x1 for CV risk reduction. Increase to 10x1.     Bps controlled on diltiazem 120x1 and lisinopril 10x1.    Pt has C4b venous insufficiency bilaterally. Continue compression stockings and lifestyle modifications. Recommend continued exercise and increase of walking distance.     Follow up in about 6 months (around 3/18/2024).      Deedee Li MD

## 2023-09-25 RX ORDER — LEVOTHYROXINE SODIUM 112 UG/1
112 TABLET ORAL
Qty: 90 TABLET | Refills: 3 | Status: SHIPPED | OUTPATIENT
Start: 2023-09-25

## 2023-09-26 ENCOUNTER — LAB VISIT (OUTPATIENT)
Dept: LAB | Facility: HOSPITAL | Age: 73
End: 2023-09-26
Payer: MEDICARE

## 2023-09-26 ENCOUNTER — IMMUNIZATION (OUTPATIENT)
Dept: INTERNAL MEDICINE | Facility: CLINIC | Age: 73
End: 2023-09-26
Payer: MEDICARE

## 2023-09-26 ENCOUNTER — OFFICE VISIT (OUTPATIENT)
Dept: INTERNAL MEDICINE | Facility: CLINIC | Age: 73
End: 2023-09-26
Payer: MEDICARE

## 2023-09-26 VITALS
OXYGEN SATURATION: 98 % | WEIGHT: 193.81 LBS | HEART RATE: 94 BPM | SYSTOLIC BLOOD PRESSURE: 110 MMHG | HEIGHT: 64 IN | DIASTOLIC BLOOD PRESSURE: 70 MMHG | BODY MASS INDEX: 33.09 KG/M2

## 2023-09-26 DIAGNOSIS — I10 ESSENTIAL HYPERTENSION: ICD-10-CM

## 2023-09-26 DIAGNOSIS — I70.0 ATHEROSCLEROSIS OF AORTA: ICD-10-CM

## 2023-09-26 DIAGNOSIS — Z00.00 ENCOUNTER FOR PREVENTIVE HEALTH EXAMINATION: Primary | ICD-10-CM

## 2023-09-26 DIAGNOSIS — E03.9 ACQUIRED HYPOTHYROIDISM: ICD-10-CM

## 2023-09-26 DIAGNOSIS — Z11.59 NEED FOR HEPATITIS C SCREENING TEST: ICD-10-CM

## 2023-09-26 DIAGNOSIS — M81.0 OSTEOPOROSIS, UNSPECIFIED OSTEOPOROSIS TYPE, UNSPECIFIED PATHOLOGICAL FRACTURE PRESENCE: ICD-10-CM

## 2023-09-26 DIAGNOSIS — N18.31 STAGE 3A CHRONIC KIDNEY DISEASE: ICD-10-CM

## 2023-09-26 DIAGNOSIS — E66.09 CLASS 1 OBESITY DUE TO EXCESS CALORIES WITH SERIOUS COMORBIDITY AND BODY MASS INDEX (BMI) OF 33.0 TO 33.9 IN ADULT: ICD-10-CM

## 2023-09-26 DIAGNOSIS — I48.0 PAROXYSMAL ATRIAL FIBRILLATION: ICD-10-CM

## 2023-09-26 DIAGNOSIS — I87.2 VENOUS INSUFFICIENCY: ICD-10-CM

## 2023-09-26 PROBLEM — E66.811 CLASS 1 OBESITY DUE TO EXCESS CALORIES WITH SERIOUS COMORBIDITY AND BODY MASS INDEX (BMI) OF 33.0 TO 33.9 IN ADULT: Status: ACTIVE | Noted: 2021-11-19

## 2023-09-26 LAB — HCV AB SERPL QL IA: NORMAL

## 2023-09-26 PROCEDURE — 99214 OFFICE O/P EST MOD 30 MIN: CPT | Mod: PBBFAC,25 | Performed by: PHYSICIAN ASSISTANT

## 2023-09-26 PROCEDURE — 99999PBSHW FLU VACCINE - QUADRIVALENT - ADJUVANTED: ICD-10-PCS | Mod: PBBFAC,,,

## 2023-09-26 PROCEDURE — 99999 PR PBB SHADOW E&M-EST. PATIENT-LVL IV: ICD-10-PCS | Mod: PBBFAC,,, | Performed by: PHYSICIAN ASSISTANT

## 2023-09-26 PROCEDURE — 99999 PR PBB SHADOW E&M-EST. PATIENT-LVL IV: CPT | Mod: PBBFAC,,, | Performed by: PHYSICIAN ASSISTANT

## 2023-09-26 PROCEDURE — G0439 PR MEDICARE ANNUAL WELLNESS SUBSEQUENT VISIT: ICD-10-PCS | Mod: ,,, | Performed by: PHYSICIAN ASSISTANT

## 2023-09-26 PROCEDURE — G0439 PPPS, SUBSEQ VISIT: HCPCS | Mod: ,,, | Performed by: PHYSICIAN ASSISTANT

## 2023-09-26 PROCEDURE — 36415 COLL VENOUS BLD VENIPUNCTURE: CPT | Performed by: PHYSICIAN ASSISTANT

## 2023-09-26 PROCEDURE — G0008 ADMIN INFLUENZA VIRUS VAC: HCPCS | Mod: PBBFAC

## 2023-09-26 PROCEDURE — 99999PBSHW FLU VACCINE - QUADRIVALENT - ADJUVANTED: Mod: PBBFAC,,,

## 2023-09-26 PROCEDURE — 86803 HEPATITIS C AB TEST: CPT | Performed by: PHYSICIAN ASSISTANT

## 2023-09-26 NOTE — PROGRESS NOTES
"  Rosa Maria Vazquez presented for a  Medicare AWV and comprehensive Health Risk Assessment today. The following components were reviewed and updated:    Medical history  Family History  Social history  Allergies and Current Medications  Health Risk Assessment  Health Maintenance  Care Team         ** See Completed Assessments for Annual Wellness Visit within the encounter summary.**         The following assessments were completed:  Living Situation  CAGE  Depression Screening  Timed Get Up and Go  Whisper Test  Cognitive Function Screening      Nutrition Screening  ADL Screening  PAQ Screening        Vitals:    09/26/23 0826   BP: 110/70   BP Location: Left arm   Patient Position: Sitting   BP Method: Large (Manual)   Pulse: 94   SpO2: 98%   Weight: 87.9 kg (193 lb 12.6 oz)   Height: 5' 4" (1.626 m)     Body mass index is 33.26 kg/m².  Physical Exam  Vitals reviewed.   Constitutional:       General: She is not in acute distress.     Appearance: She is well-developed. She is not ill-appearing.   HENT:      Head: Normocephalic and atraumatic.      Right Ear: Tympanic membrane, ear canal and external ear normal.      Left Ear: Tympanic membrane, ear canal and external ear normal.   Cardiovascular:      Rate and Rhythm: Normal rate and regular rhythm.      Heart sounds: No murmur heard.  Pulmonary:      Effort: Pulmonary effort is normal.      Breath sounds: Normal breath sounds. No wheezing or rales.   Musculoskeletal:      Right lower leg: No edema.      Left lower leg: No edema.   Lymphadenopathy:      Cervical: No cervical adenopathy.   Skin:     General: Skin is warm and dry.      Findings: No rash.   Neurological:      Mental Status: She is alert and oriented to person, place, and time.   Psychiatric:         Mood and Affect: Mood normal.               Diagnoses and health risks identified today and associated recommendations/orders:    1. Encounter for preventive health examination  Exam and Assessments " performed  Chart Review Complete  Health Maintenance Reviewed and updated      2. Stage 3a chronic kidney disease  Stable  Followed by PCP    3. Paroxysmal atrial fibrillation  Stable  S/p pacemaker  Followed by Cardiology/EP    4. Atherosclerosis of aorta  Noted on prior imaging  Stable  Followed by Cardiology    5. Venous insufficiency  Stable with compression garments  Followed by PCP/Cardiology    6. Essential hypertension  Stable on current meds  Followed by PCP/Cardiology    7. Acquired hypothyroidism  Lab Results   Component Value Date    TSH 1.014 05/23/2023   Stable on current meds  Followed by PCP      8. Osteoporosis, unspecified osteoporosis type, unspecified pathological fracture presence  Stable on current meds  Dexa UTD  Followed by PCP    9. Class 1 obesity due to excess calories with serious comorbidity and body mass index (BMI) of 33.0 to 33.9 in adult  Body mass index is 33.26 kg/m².  BMI reviewed  Lifestyle mods discussed  Followed by PCP    10. Need for hepatitis C screening test  - Hepatitis C Antibody; Future      Provided Rosa Maria with a 5-10 year written screening schedule and personal prevention plan. Recommendations were developed using the USPSTF age appropriate recommendations. Education, counseling, and referrals were provided as needed. After Visit Summary printed and given to patient which includes a list of additional screenings\tests needed.    Follow up in about 8 weeks (around 11/24/2023) for PCP follow up and 1 year for next Medicare AWV.    Haley Henson PA-C    Future Appointments   Date Time Provider Department Center   9/26/2023  9:40 AM LAB, APPOINTMENT [facility] INTKHAI [facility] LAB [facility]   9/26/2023 10:00 AM FLU VACCINE, PRIMARY CARE IMMUNIZATION STATION [facility] Igor kevin [facility]   10/6/2023  8:00 AM EKG, APPT [facility] EKG [facility]   10/6/2023  8:40 AM COORDINATED DEVICE CHECK [facility] ARRHPRO [facility]   10/6/2023  9:30 AM Mandy Ott NP [facility] ARRHYTH [facility]   11/24/2023   9:15 AM Neftaly Lebron MD [facility] PRIANETTE [facility] [place]   12/17/2023 10:00 AM HOME MONITOR DEVICE CHECK, [facility] FELY [facility]   3/18/2024 10:40 AM Deedee Li MD [facility] CARDIO [place]

## 2023-09-26 NOTE — PATIENT INSTRUCTIONS
Counseling and Referral of Other Preventative  (Italic type indicates deductible and co-insurance are waived)    Patient Name: Rosa Maria Vazquez  Today's Date: 9/26/2023    Health Maintenance         Date Due Completion Date    Hepatitis C Screening Never done ---    Shingles Vaccine (2 of 2) 08/22/2021 6/27/2021     Influenza Vaccine (1) 09/01/2023 9/27/2022    COVID-19 Vaccine (10 - Moderna risk series) 11/16/2023 9/21/2023    Mammogram 03/13/2024 3/13/2023    Hemoglobin A1c (Prediabetes) 05/23/2024 5/23/2023    High Dose Statin 09/26/2024 9/26/2023    DEXA Scan 12/08/2024 12/8/2022    Override on 6/1/2020: Done (done at [facility])    Colorectal Cancer Screening 01/01/2026 1/1/2016 (Done)    Override on 1/1/2016: Done (Done in [place])    Lipid Panel 05/23/2028 5/23/2023    TETANUS VACCINE 10/31/2028 10/31/2018          Orders Placed This Encounter   Procedures    Hepatitis C Antibody       The following information is provided to all patients.  This information is to help you find resources for any of the problems found today that may be affecting your health:                Living healthy guide: www.[facility].[place].gov      Understanding Diabetes: www.diabetes.org      Eating healthy: www.cdc.gov/healthyweight      CDC home safety checklist: www.cdc.gov/steadi/patient.html      Agency on Aging: www.goea.[place].gov      Alcoholics anonymous (AA): www.aa.org      Physical Activity: www.leon.nih.gov/ro1wzro      Tobacco use: www.quitwithusla.org     Counseling and Referral of Other Preventative  (Italic type indicates deductible and co-insurance are waived)    Patient Name: Rosa Maria Vazquez  Today's Date: 9/26/2023    Health Maintenance       Date Due Completion Date    Hepatitis C Screening Never done ---    Shingles Vaccine (2 of 2) 08/22/2021 6/27/2021    Influenza Vaccine (1) 09/01/2023 9/27/2022    COVID-19 Vaccine (10 - Moderna risk series) 11/16/2023 9/21/2023    Mammogram 03/13/2024 3/13/2023     Hemoglobin A1c (Prediabetes) 05/23/2024 5/23/2023    High Dose Statin 09/26/2024 9/26/2023    DEXA Scan 12/08/2024 12/8/2022    Override on 6/1/2020: Done (done at [facility])    Colorectal Cancer Screening 01/01/2026 1/1/2016 (Done)    Override on 1/1/2016: Done (Done in [place], [place])    Lipid Panel 05/23/2028 5/23/2023    TETANUS VACCINE 10/31/2028 10/31/2018        Orders Placed This Encounter   Procedures    Hepatitis C Antibody     The following information is provided to all patients.  This information is to help you find resources for any of the problems found today that may be affecting your health:                Living healthy guide: www.[facility].[place].gov      Understanding Diabetes: www.diabetes.org      Eating healthy: www.cdc.gov/healthyweight      [facility] home safety checklist: www.cdc.gov/steadi/patient.html      Agency on Aging: www.goea.[place].[facility]      Alcoholics anonymous (AA): www.aa.org      Physical Activity: www.leon.nih.gov/ie1mfep      Tobacco use: www.quitwithusla.org

## 2023-10-26 DIAGNOSIS — E53.8 VITAMIN B12 DEFICIENCY: Primary | ICD-10-CM

## 2023-10-26 RX ORDER — CYANOCOBALAMIN 1000 UG/ML
1000 INJECTION, SOLUTION INTRAMUSCULAR; SUBCUTANEOUS
Qty: 3 ML | Refills: 3 | Status: SHIPPED | OUTPATIENT
Start: 2023-10-26

## 2023-10-26 NOTE — TELEPHONE ENCOUNTER
----- Message from Brent Langston sent at 10/26/2023  9:37 AM CDT -----  Contact: [phone]  Requesting an RX refill or new RX.  Is this a refill or new RX: refill  RX name and strength (copy/paste from chart):  cyanocobalamin 1,000 mcg/mL injection  Is this a 30 day or 90 day RX:   Pharmacy name and phone # (copy/paste from chart):   [id number] - [place] - [address]  [address] [facility]  [place] [address]  Phone: [phone] Fax: [phone]     The doctors have asked that we provide their patients with the following 2 reminders -- prescription refills can take up to 72 hours, and a friendly reminder that in the future you can use your [facility] account to request refills: call back

## 2023-12-15 ENCOUNTER — TELEPHONE (OUTPATIENT)
Dept: DERMATOLOGY | Facility: CLINIC | Age: 73
End: 2023-12-15
Payer: MEDICARE

## 2023-12-15 NOTE — TELEPHONE ENCOUNTER
----- Message from Maria A Cannon sent at 12/15/2023 11:11 AM CST -----  Regarding: appt access  Contact: pt [phone]  PATIENT CALL    Pt called to schedule NP appt, dx eczema. Would like to est care w/ this provider at [facility], morning appts preferred.  Please call back at [phone]

## 2023-12-17 ENCOUNTER — CLINICAL SUPPORT (OUTPATIENT)
Dept: CARDIOLOGY | Facility: HOSPITAL | Age: 73
End: 2023-12-17

## 2023-12-17 DIAGNOSIS — R00.1 BRADYCARDIA, UNSPECIFIED: ICD-10-CM

## 2023-12-18 ENCOUNTER — CLINICAL SUPPORT (OUTPATIENT)
Dept: CARDIOLOGY | Facility: HOSPITAL | Age: 73
End: 2023-12-18
Attending: INTERNAL MEDICINE
Payer: MEDICARE

## 2023-12-18 DIAGNOSIS — R00.1 BRADYCARDIA, UNSPECIFIED: ICD-10-CM

## 2023-12-18 PROCEDURE — 93294 REM INTERROG EVL PM/LDLS PM: CPT | Mod: ,,, | Performed by: INTERNAL MEDICINE

## 2023-12-19 NOTE — PROGRESS NOTES
[facility] Progress Note  12/21/2023          Reason for Visit:      had concerns including Follow-up and Atrial Fibrillation.       History of Present Illness:     Pleasant patient here today for follow up  No new compalints today    Afib/Aflutter  Eliquis 5 mg bid  Flecainide 100 bid  Cardizem 120 daily  She had slow ventircular response- episodes of bradycardia  She feels much better after getting the pacemaker and is not having any more of the presyncope/syncope.  Done June 9.  She is still going into afib occasionally, but brief, and can feel it when she does.  Sees Dr. Li     Hypothyroidism  Levothyroxine 112  Normal Tsh and Free T4 in may  She is agreable to repeat that today     Osteoporosis   Alendronate 70mg  Dexa done 12/2022 showed T score -2.5.  Started alendronate then.  Takes calcium and a vitamin D supplement.    No side effects  Plan repeat DEXA in 12/2024     CKD3a  Her kidney function has been mildly reduced in the stage 3a range.    Stable on recent labs     HTN  Lisinopril 10 mg daily  Cardizem 120 daily  Blood pressure has been controlled on current meds        Aortic atherosclerosis  Crestor 10 mg  PT saw vascular surgery for venous insufficiency and they noted some plaque.  She is on crestor and tolerating.       Reviewed   Plan MMG in march  It looks like she has actually gotten the most recent covid booster, so despite the  screen saying she is due, looks like she is currenlty Up to date.    Problem List:     Patient Active Problem List   Diagnosis    Essential hypertension    Acquired hypothyroidism    Osteoporosis    History of Naga-en-Y gastric bypass    Class 1 obesity due to excess calories with serious comorbidity and body mass index (BMI) of 33.0 to 33.9 in adult    Stage 3a chronic kidney disease    Vitamin D deficiency    Venous insufficiency    Atherosclerosis of aorta    Paroxysmal atrial fibrillation    Symptomatic bradycardia    Cardiac pacemaker  in situ - LBBA lead         Medications:       Current Outpatient Medications:     apixaban (ELIQUIS) 5 mg Tab, Take 1 tablet (5 mg total) by mouth 2 (two) times daily., Disp: 180 tablet, Rfl: 3    calcium citrate-vitamin D3 315-200 mg (CITRACAL+D) 315 mg-5 mcg (200 unit) per tablet, Take 1 tablet by mouth 2 (two) times daily., Disp: , Rfl:     cyanocobalamin 1,000 mcg/mL injection, INJECT 1 ML (1,000 MCG TOTAL) INTO THE MUSCLE EVERY 30 DAYS., Disp: 3 mL, Rfl: 3    diltiaZEM (CARDIZEM) 120 MG tablet, Take 1 tablet (120 mg total) by mouth once daily., Disp: 30 tablet, Rfl: 6    flecainide (TAMBOCOR) 100 MG Tab, Take 1 tablet (100 mg total) by mouth every 12 (twelve) hours., Disp: 60 tablet, Rfl: 6    levothyroxine (SYNTHROID) 112 MCG tablet, TAKE 1 TABLET BY MOUTH EVERY DAY BEFORE BREAKFAST, Disp: 90 tablet, Rfl: 3    lisinopriL 10 MG tablet, TAKE 1 TABLET BY MOUTH EVERY DAY, Disp: 90 tablet, Rfl: 3    multivitamin capsule, Take 1 capsule by mouth once daily., Disp: , Rfl:     rosuvastatin (CRESTOR) 10 MG tablet, Take 1 tablet (10 mg total) by mouth once daily., Disp: 90 tablet, Rfl: 3    alendronate (FOSAMAX) 70 MG tablet, Take 1 tablet (70 mg total) by mouth every 7 days., Disp: 4 tablet, Rfl: 11    famotidine (PEPCID) 20 MG tablet, Take 1 tablet (20 mg total) by mouth 2 (two) times daily. (Patient taking differently: Take 20 mg by mouth daily as needed.), Disp: 60 tablet, Rfl: 3        Review of Systems:     Review of Systems   Constitutional:  Negative for chills and fever.   HENT:  Negative for ear pain and sore throat.    Respiratory:  Negative for cough, shortness of breath and wheezing.    Cardiovascular:  Negative for chest pain and palpitations.   Gastrointestinal:  Negative for constipation, diarrhea, nausea and vomiting.   Genitourinary:  Negative for dysuria and hematuria.   Musculoskeletal:  Negative for joint swelling and joint deformity.   Neurological:  Negative for dizziness and weakness.  "          Physical Exam:     Temp:             Blood Pressure:  112/62        Pulse:   84     Respirations:  16  Weight:   83.3 kg (183 lb 10.3 oz)  Height:   5' 4" (1.626 m)  BMI:     Body mass index is 31.52 kg/m².    Physical Exam  Constitutional:       General: She is not in acute distress.  HENT:      Right Ear: Tympanic membrane normal.      Left Ear: Tympanic membrane normal.      Nose: No congestion or rhinorrhea.      Mouth/Throat:      Pharynx: No oropharyngeal exudate or posterior oropharyngeal erythema.   Cardiovascular:      Rate and Rhythm: Normal rate and regular rhythm.   Pulmonary:      Effort: Pulmonary effort is normal. No respiratory distress.      Breath sounds: No wheezing.   Abdominal:      Palpations: Abdomen is soft.      Tenderness: There is no abdominal tenderness.   Skin:     General: Skin is warm.   Neurological:      General: No focal deficit present.      Mental Status: She is alert and oriented to person, place, and time.           Labs/Imaging/Testing:     Most recent CBC:  Lab Results   Component Value Date    WBC 3.92 07/11/2023    HGB 13.3 07/11/2023     07/11/2023    MCV 86 07/11/2023       Most recent Lipids:  Lab Results   Component Value Date    CHOL 121 05/23/2023    HDL 51 05/23/2023    LDLCALC 59.2 (L) 05/23/2023    TRIG 54 05/23/2023       Most recent Chemistry:  Lab Results   Component Value Date     07/11/2023    K 4.7 07/11/2023     07/11/2023    CO2 23 07/11/2023    BUN 22 07/11/2023    CREATININE 1.0 07/11/2023    GLU 78 07/11/2023    CALCIUM 10.0 07/11/2023    ALT 19 05/23/2023    AST 24 05/23/2023    ALKPHOS 66 05/23/2023    PROT 7.0 05/23/2023    ALBUMIN 3.8 05/23/2023       Other tests:  Lab Results   Component Value Date    TSH 1.014 05/23/2023    FREET4 1.10 05/23/2023    INR 1.0 07/11/2023    HGBA1C 5.5 05/23/2023    FERRITIN 30 11/19/2021    TDFITEEB89 1186 (H) 11/19/2021    [id number] 51 11/19/2021    MG 2.3 11/19/2021       Assessment " and Plan:     1. Paroxysmal atrial fibrillation  Stable on current meds s/p pacemaker.    - CBC Auto Differential; Future  - Comprehensive Metabolic Panel; Future  - Lipid Panel; Future  - TSH; Future  - T4, Free; Future  - Vitamin D; Future    2. Acquired hypothyroidism  Cotninue levothyroxine at current dose  - TSH; Future  - T4, Free; Future    3. Osteoporosis, unspecified osteoporosis type, unspecified pathological fracture presence  Continue alendronate, plan dexa in December    4. Stage 3a chronic kidney disease  - CBC Auto Differential; Future  - Comprehensive Metabolic Panel; Future  - Lipid Panel; Future  - TSH; Future  - T4, Free; Future  - Vitamin D; Future    5. Essential hypertension  Stable, continue current meds    6. Atherosclerosis of aorta  - Lipid Panel; Future    7. Vitamin D deficiency  - Vitamin D; Future       RTC 6 mos or sooner mary Lebron MD  12/21/2023    This note was prepared using voice-recognition software.  Although efforts are made to proofread the note, some errors may persist in the final document.

## 2023-12-21 ENCOUNTER — LAB VISIT (OUTPATIENT)
Dept: LAB | Facility: HOSPITAL | Age: 73
End: 2023-12-21
Attending: INTERNAL MEDICINE
Payer: MEDICARE

## 2023-12-21 ENCOUNTER — OFFICE VISIT (OUTPATIENT)
Dept: PRIMARY CARE CLINIC | Facility: CLINIC | Age: 73
End: 2023-12-21
Payer: MEDICARE

## 2023-12-21 VITALS
OXYGEN SATURATION: 98 % | DIASTOLIC BLOOD PRESSURE: 62 MMHG | BODY MASS INDEX: 31.35 KG/M2 | WEIGHT: 183.63 LBS | RESPIRATION RATE: 16 BRPM | HEART RATE: 84 BPM | SYSTOLIC BLOOD PRESSURE: 112 MMHG | HEIGHT: 64 IN

## 2023-12-21 DIAGNOSIS — I48.0 PAROXYSMAL ATRIAL FIBRILLATION: ICD-10-CM

## 2023-12-21 DIAGNOSIS — E55.9 VITAMIN D DEFICIENCY: ICD-10-CM

## 2023-12-21 DIAGNOSIS — E03.9 ACQUIRED HYPOTHYROIDISM: ICD-10-CM

## 2023-12-21 DIAGNOSIS — I48.0 PAROXYSMAL ATRIAL FIBRILLATION: Primary | ICD-10-CM

## 2023-12-21 DIAGNOSIS — N18.31 STAGE 3A CHRONIC KIDNEY DISEASE: ICD-10-CM

## 2023-12-21 DIAGNOSIS — I70.0 ATHEROSCLEROSIS OF AORTA: ICD-10-CM

## 2023-12-21 DIAGNOSIS — I10 ESSENTIAL HYPERTENSION: ICD-10-CM

## 2023-12-21 DIAGNOSIS — M81.0 OSTEOPOROSIS, UNSPECIFIED OSTEOPOROSIS TYPE, UNSPECIFIED PATHOLOGICAL FRACTURE PRESENCE: ICD-10-CM

## 2023-12-21 LAB
25(OH)D3+25(OH)D2 SERPL-MCNC: 54 NG/ML (ref 30–96)
ALBUMIN SERPL BCP-MCNC: 3.8 G/DL (ref 3.5–5.2)
ALP SERPL-CCNC: 61 U/L (ref 55–135)
ALT SERPL W/O P-5'-P-CCNC: 16 U/L (ref 10–44)
ANION GAP SERPL CALC-SCNC: 10 MMOL/L (ref 8–16)
AST SERPL-CCNC: 28 U/L (ref 10–40)
BASOPHILS # BLD AUTO: 0.03 K/UL (ref 0–0.2)
BASOPHILS NFR BLD: 0.8 % (ref 0–1.9)
BILIRUB SERPL-MCNC: 0.5 MG/DL (ref 0.1–1)
BUN SERPL-MCNC: 21 MG/DL (ref 8–23)
CALCIUM SERPL-MCNC: 10.1 MG/DL (ref 8.7–10.5)
CHLORIDE SERPL-SCNC: 105 MMOL/L (ref 95–110)
CHOLEST SERPL-MCNC: 133 MG/DL (ref 120–199)
CHOLEST/HDLC SERPL: 2.3 {RATIO} (ref 2–5)
CO2 SERPL-SCNC: 26 MMOL/L (ref 23–29)
CREAT SERPL-MCNC: 1.1 MG/DL (ref 0.5–1.4)
DIFFERENTIAL METHOD: ABNORMAL
EOSINOPHIL # BLD AUTO: 0.2 K/UL (ref 0–0.5)
EOSINOPHIL NFR BLD: 4.7 % (ref 0–8)
ERYTHROCYTE [DISTWIDTH] IN BLOOD BY AUTOMATED COUNT: 11.9 % (ref 11.5–14.5)
EST. GFR  (NO RACE VARIABLE): 53.7 ML/MIN/1.73 M^2
GLUCOSE SERPL-MCNC: 71 MG/DL (ref 70–110)
HCT VFR BLD AUTO: 40.2 % (ref 37–48.5)
HDLC SERPL-MCNC: 57 MG/DL (ref 40–75)
HDLC SERPL: 42.9 % (ref 20–50)
HGB BLD-MCNC: 13.2 G/DL (ref 12–16)
IMM GRANULOCYTES # BLD AUTO: 0.04 K/UL (ref 0–0.04)
IMM GRANULOCYTES NFR BLD AUTO: 1.1 % (ref 0–0.5)
LDLC SERPL CALC-MCNC: 65.4 MG/DL (ref 63–159)
LYMPHOCYTES # BLD AUTO: 1 K/UL (ref 1–4.8)
LYMPHOCYTES NFR BLD: 26.2 % (ref 18–48)
MCH RBC QN AUTO: 30.5 PG (ref 27–31)
MCHC RBC AUTO-ENTMCNC: 32.8 G/DL (ref 32–36)
MCV RBC AUTO: 93 FL (ref 82–98)
MONOCYTES # BLD AUTO: 0.3 K/UL (ref 0.3–1)
MONOCYTES NFR BLD: 7.5 % (ref 4–15)
NEUTROPHILS # BLD AUTO: 2.2 K/UL (ref 1.8–7.7)
NEUTROPHILS NFR BLD: 59.7 % (ref 38–73)
NONHDLC SERPL-MCNC: 76 MG/DL
NRBC BLD-RTO: 0 /100 WBC
PLATELET # BLD AUTO: 188 K/UL (ref 150–450)
PMV BLD AUTO: 12.9 FL (ref 9.2–12.9)
POTASSIUM SERPL-SCNC: 4.6 MMOL/L (ref 3.5–5.1)
PROT SERPL-MCNC: 7.4 G/DL (ref 6–8.4)
RBC # BLD AUTO: 4.33 M/UL (ref 4–5.4)
SODIUM SERPL-SCNC: 141 MMOL/L (ref 136–145)
T4 FREE SERPL-MCNC: 1.18 NG/DL (ref 0.71–1.51)
TRIGL SERPL-MCNC: 53 MG/DL (ref 30–150)
TSH SERPL DL<=0.005 MIU/L-ACNC: 1.29 UIU/ML (ref 0.4–4)
WBC # BLD AUTO: 3.62 K/UL (ref 3.9–12.7)

## 2023-12-21 PROCEDURE — 80053 COMPREHEN METABOLIC PANEL: CPT | Performed by: INTERNAL MEDICINE

## 2023-12-21 PROCEDURE — 99214 OFFICE O/P EST MOD 30 MIN: CPT | Mod: S$PBB,,, | Performed by: INTERNAL MEDICINE

## 2023-12-21 PROCEDURE — 99999 PR PBB SHADOW E&M-EST. PATIENT-LVL IV: ICD-10-PCS | Mod: PBBFAC,,, | Performed by: INTERNAL MEDICINE

## 2023-12-21 PROCEDURE — 85025 COMPLETE CBC W/AUTO DIFF WBC: CPT | Performed by: INTERNAL MEDICINE

## 2023-12-21 PROCEDURE — 82306 VITAMIN D 25 HYDROXY: CPT | Performed by: INTERNAL MEDICINE

## 2023-12-21 PROCEDURE — 99214 PR OFFICE/OUTPT VISIT, EST, LEVL IV, 30-39 MIN: ICD-10-PCS | Mod: S$PBB,,, | Performed by: INTERNAL MEDICINE

## 2023-12-21 PROCEDURE — 99999 PR PBB SHADOW E&M-EST. PATIENT-LVL IV: CPT | Mod: PBBFAC,,, | Performed by: INTERNAL MEDICINE

## 2023-12-21 PROCEDURE — 99214 OFFICE O/P EST MOD 30 MIN: CPT | Mod: PBBFAC,PN | Performed by: INTERNAL MEDICINE

## 2023-12-21 PROCEDURE — 84439 ASSAY OF FREE THYROXINE: CPT | Performed by: INTERNAL MEDICINE

## 2023-12-21 PROCEDURE — 84443 ASSAY THYROID STIM HORMONE: CPT | Performed by: INTERNAL MEDICINE

## 2023-12-21 PROCEDURE — 36415 COLL VENOUS BLD VENIPUNCTURE: CPT | Mod: PN | Performed by: INTERNAL MEDICINE

## 2023-12-21 PROCEDURE — 80061 LIPID PANEL: CPT | Performed by: INTERNAL MEDICINE

## 2023-12-21 RX ORDER — ALENDRONATE SODIUM 70 MG/1
70 TABLET ORAL
Qty: 4 TABLET | Refills: 11 | Status: SHIPPED | OUTPATIENT
Start: 2023-12-21 | End: 2024-12-20

## 2023-12-26 ENCOUNTER — TELEPHONE (OUTPATIENT)
Dept: PRIMARY CARE CLINIC | Facility: CLINIC | Age: 73
End: 2023-12-26
Payer: MEDICARE

## 2023-12-26 NOTE — TELEPHONE ENCOUNTER
Spoke with pt regarding her lab results, pt expressed understanding of the results. Pt did not have any questions for me, and she states he will follow up if she has any questions/concerns.

## 2023-12-26 NOTE — TELEPHONE ENCOUNTER
----- Message from Neftaly Lebron MD sent at 12/22/2023  5:41 PM CST -----  Your blood work looked mostly good. Your white blood cell count was just a little low, but in looking back it looks like It has usually been on lower end of normal.  Some people can have a lower WBC at baseline.  You werent' having symptoms that would make me worried about this so I think we can just watch for right now.

## 2023-12-29 ENCOUNTER — OFFICE VISIT (OUTPATIENT)
Dept: URGENT CARE | Facility: CLINIC | Age: 73
End: 2023-12-29
Payer: MEDICARE

## 2023-12-29 VITALS
HEIGHT: 64 IN | SYSTOLIC BLOOD PRESSURE: 119 MMHG | OXYGEN SATURATION: 98 % | BODY MASS INDEX: 31.35 KG/M2 | TEMPERATURE: 98 F | RESPIRATION RATE: 19 BRPM | DIASTOLIC BLOOD PRESSURE: 80 MMHG | HEART RATE: 66 BPM | WEIGHT: 183.63 LBS

## 2023-12-29 DIAGNOSIS — H65.193 ACUTE EFFUSION OF BOTH MIDDLE EARS: ICD-10-CM

## 2023-12-29 DIAGNOSIS — U07.1 COVID-19: Primary | ICD-10-CM

## 2023-12-29 PROBLEM — M81.0 AGE-RELATED OSTEOPOROSIS WITHOUT CURRENT PATHOLOGICAL FRACTURE: Status: ACTIVE | Noted: 2021-11-19

## 2023-12-29 LAB
CTP QC/QA: YES
CTP QC/QA: YES
POC MOLECULAR INFLUENZA A AGN: NEGATIVE
POC MOLECULAR INFLUENZA B AGN: NEGATIVE
SARS-COV-2 AG RESP QL IA.RAPID: NEGATIVE

## 2023-12-29 PROCEDURE — 87811 SARS-COV-2 COVID19 W/OPTIC: CPT | Mod: QW,S$GLB,, | Performed by: PHYSICIAN ASSISTANT

## 2023-12-29 PROCEDURE — 99213 OFFICE O/P EST LOW 20 MIN: CPT | Mod: S$GLB,,, | Performed by: PHYSICIAN ASSISTANT

## 2023-12-29 PROCEDURE — 87811 SARS CORONAVIRUS 2 ANTIGEN POCT, MANUAL READ: ICD-10-PCS | Mod: QW,S$GLB,, | Performed by: PHYSICIAN ASSISTANT

## 2023-12-29 PROCEDURE — 87502 INFLUENZA DNA AMP PROBE: CPT | Mod: QW,S$GLB,, | Performed by: PHYSICIAN ASSISTANT

## 2023-12-29 PROCEDURE — 87502 POCT INFLUENZA A/B MOLECULAR: ICD-10-PCS | Mod: QW,S$GLB,, | Performed by: PHYSICIAN ASSISTANT

## 2023-12-29 PROCEDURE — 99213 PR OFFICE/OUTPT VISIT, EST, LEVL III, 20-29 MIN: ICD-10-PCS | Mod: S$GLB,,, | Performed by: PHYSICIAN ASSISTANT

## 2023-12-29 NOTE — PROGRESS NOTES
"Subjective:      Patient ID: Rosa Maria Vazquez is a 73 y.o. female.    Vitals:  height is 5' 4" (1.626 m) and weight is 83.3 kg (183 lb 10.3 oz). Her oral temperature is 98 °F (36.7 °C). Her blood pressure is 119/80 and her pulse is 66. Her respiration is 19 and oxygen saturation is 98%.     Chief Complaint: Sinus Problem    This is a 73 y.o. female who presents today with a chief complaint of sore throat, feel weak/tired, post nasal drip, nasal congestion yesterday.   Pt has not taken any OTC medication to help with symptoms.     Sinus Problem  This is a new problem. The current episode started yesterday. The problem has been gradually worsening since onset. There has been no fever. Her pain is at a severity of 0/10. She is experiencing no pain. Associated symptoms include congestion, headaches, sinus pressure, sneezing and a sore throat (Left side). Pertinent negatives include no chills, coughing, diaphoresis, ear pain, hoarse voice, neck pain, shortness of breath or swollen glands. Past treatments include nothing. The treatment provided no relief.       Constitution: Negative for chills, sweating, fatigue and fever.   HENT:  Positive for congestion, sinus pressure and sore throat (Left side). Negative for ear pain, ear discharge, tinnitus, hearing loss, dental problem, drooling, mouth sores, tongue pain, tongue lesion, postnasal drip, sinus pain, trouble swallowing and voice change.    Neck: Negative for neck pain, neck stiffness and painful lymph nodes.   Cardiovascular:  Negative for chest pain and sob on exertion.   Eyes:  Negative for eye discharge and eye itching.   Respiratory:  Negative for chest tightness, cough, sputum production and shortness of breath.    Gastrointestinal:  Negative for abdominal pain, nausea, vomiting, constipation and diarrhea.   Musculoskeletal:  Negative for muscle cramps and muscle ache.   Skin:  Negative for rash.   Allergic/Immunologic: Positive for sneezing.   Neurological:  " Positive for headaches. Negative for dizziness and altered mental status.   Hematologic/Lymphatic: Negative for swollen lymph nodes.   Psychiatric/Behavioral:  Negative for altered mental status.       Past Medical History:   Diagnosis Date    Anticoagulant long-term use     Atrial fibrillation     Hypertension     Thyroid disease        Past Surgical History:   Procedure Laterality Date    A-V CARDIAC PACEMAKER INSERTION N/A 2023    Procedure: INSERTION, CARDIAC PACEMAKER, DUAL CHAMBER;  Surgeon: Zeke Chance MD;  Location: [facility] EP LAB;  Service: Cardiology;  Laterality: N/A;  SB/AF, Dual PPM with LBAP lead, MDT, MAC, ID, 3 Prep    ECHOCARDIOGRAM,TRANSESOPHAGEAL N/A 2023    Procedure: Transesophageal echo (TIM) intra-procedure log documentation;  Surgeon: Randy Allan MD;  Location: [facility] EP LAB;  Service: Cardiology;  Laterality: N/A;    EYE SURGERY      GASTRIC BYPASS      TREATMENT OF CARDIAC ARRHYTHMIA N/A 2023    Procedure: Cardioversion or Defibrillation;  Surgeon: Zeke Chance MD;  Location: [facility] EP LAB;  Service: Cardiology;  Laterality: N/A;  AF, TIM, DCCV, MAC, ID, 3 Prep** MDT PPM in situ**       Family History   Problem Relation Age of Onset    Heart disease Mother     Heart disease Father     Diabetes Sister     Protein C deficiency Sister     Protein S deficiency Sister     Rectal cancer Sister        Social History     Socioeconomic History    Marital status:    Tobacco Use    Smoking status: Former     Current packs/day: 0.00     Average packs/day: 1 pack/day for 10.0 years (10.0 ttl pk-yrs)     Types: Cigarettes     Start date:      Quit date:      Years since quittin.0     Passive exposure: Never    Smokeless tobacco: Never   Substance and Sexual Activity    Alcohol use: Never    Drug use: Never    Sexual activity: Not Currently     Partners: Male     Comment:  (no interest)     Social Determinants of Health     Financial Resource Strain: High Risk  (9/26/2023)    Overall Financial Resource Strain (CARDIA)     Difficulty of Paying Living Expenses: Hard   Food Insecurity: No Food Insecurity (9/26/2023)    Hunger Vital Sign     Worried About Running Out of Food in the Last Year: Never true     Ran Out of Food in the Last Year: Never true   Transportation Needs: No Transportation Needs (9/26/2023)    PRAPARE - Transportation     Lack of Transportation (Medical): No     Lack of Transportation (Non-Medical): No   Physical Activity: Sufficiently Active (9/26/2023)    Exercise Vital Sign     Days of Exercise per Week: 3 days     Minutes of Exercise per Session: 50 min   Stress: Stress Concern Present (9/26/2023)    [employer] [place] [employer] - Occupational Stress Questionnaire     Feeling of Stress : Rather much   Social Connections: Moderately Integrated (9/26/2023)    Social Connection and Isolation Panel [NHANES]     Frequency of Communication with Friends and Family: More than three times a week     Frequency of Social Gatherings with Friends and Family: More than three times a week     Attends [employer] Services: More than 4 times per year     Active Member of Clubs or Organizations: No     Attends Club or Organization Meetings: Never     Marital Status:    Housing Stability: Low Risk  (9/26/2023)    Housing Stability Vital Sign     Unable to Pay for Housing in the Last Year: No     Number of Places Lived in the Last Year: 1     Unstable Housing in the Last Year: No       Current Outpatient Medications   Medication Sig Dispense Refill    alendronate (FOSAMAX) 70 MG tablet Take 1 tablet (70 mg total) by mouth every 7 days. 4 tablet 11    apixaban (ELIQUIS) 5 mg Tab Take 1 tablet (5 mg total) by mouth 2 (two) times daily. 180 tablet 3    calcium citrate-vitamin D3 315-200 mg (CITRACAL+D) 315 mg-5 mcg (200 unit) per tablet Take 1 tablet by mouth 2 (two) times daily.      cyanocobalamin 1,000 mcg/mL injection INJECT 1 ML (1,000 MCG TOTAL) INTO  THE MUSCLE EVERY 30 DAYS. 3 mL 3    diltiaZEM (CARDIZEM) 120 MG tablet Take 1 tablet (120 mg total) by mouth once daily. 30 tablet 6    flecainide (TAMBOCOR) 100 MG Tab Take 1 tablet (100 mg total) by mouth every 12 (twelve) hours. 60 tablet 6    levothyroxine (SYNTHROID) 112 MCG tablet TAKE 1 TABLET BY MOUTH EVERY DAY BEFORE BREAKFAST 90 tablet 3    lisinopriL 10 MG tablet TAKE 1 TABLET BY MOUTH EVERY DAY 90 tablet 3    multivitamin capsule Take 1 capsule by mouth once daily.      rosuvastatin (CRESTOR) 10 MG tablet Take 1 tablet (10 mg total) by mouth once daily. 90 tablet 3    famotidine (PEPCID) 20 MG tablet Take 1 tablet (20 mg total) by mouth 2 (two) times daily. (Patient taking differently: Take 20 mg by mouth daily as needed.) 60 tablet 3     No current facility-administered medications for this visit.       Review of patient's allergies indicates:   Allergen Reactions    Lopressor [metoprolol tartrate] Anaphylaxis    Nickel Dermatitis and Itching    Ondansetron Other (See Comments)     Double vision         Objective:     Physical Exam   Constitutional: She is oriented to person, place, and time. She appears well-developed. She is cooperative.  Non-toxic appearance. She does not appear ill. No distress.   HENT:   Head: Normocephalic and atraumatic.   Ears:   Right Ear: Hearing, external ear and ear canal normal. Tympanic membrane is not injected, not erythematous, not retracted and not bulging. A middle ear effusion is present. impacted cerumen  Left Ear: Hearing, external ear and ear canal normal. Tympanic membrane is not injected, not erythematous, not retracted and not bulging. A middle ear effusion is present. impacted cerumen  Nose: Rhinorrhea and congestion present. No mucosal edema or nasal deformity. No epistaxis. Right sinus exhibits no maxillary sinus tenderness and no frontal sinus tenderness. Left sinus exhibits no maxillary sinus tenderness and no frontal sinus tenderness.   Mouth/Throat: Uvula  is midline, oropharynx is clear and moist and mucous membranes are normal. No trismus in the jaw. Normal dentition. No uvula swelling. No oropharyngeal exudate, posterior oropharyngeal edema or posterior oropharyngeal erythema.   Eyes: Conjunctivae and lids are normal. Right eye exhibits no discharge. Left eye exhibits no discharge. No scleral icterus.   Neck: Trachea normal and phonation normal. Neck supple. No edema present. No erythema present. No neck rigidity present.   Cardiovascular: Normal rate, regular rhythm, normal heart sounds and normal pulses.   No murmur heard.Exam reveals no gallop and no friction rub.   Pulmonary/Chest: Effort normal and breath sounds normal. No stridor. No respiratory distress. She has no decreased breath sounds. She has no wheezes. She has no rhonchi. She has no rales.   Abdominal: Normal appearance.   Musculoskeletal:      Cervical back: She exhibits no tenderness.   Lymphadenopathy:     She has no cervical adenopathy.   Neurological: She is alert and oriented to person, place, and time. She exhibits normal muscle tone. Coordination normal.   Skin: Skin is warm, dry, intact, not diaphoretic, not pale and no rash.   Psychiatric: Her speech is normal and behavior is normal. Mood, judgment and thought content normal.   Nursing note and vitals reviewed.    Results for orders placed or performed in visit on 12/29/23   SARS Coronavirus 2 Antigen, POCT Manual Read   Result Value Ref Range    SARS Coronavirus 2 Antigen Negative Negative     Acceptable Yes    POCT Influenza A/B MOLECULAR   Result Value Ref Range    POC Molecular Influenza A Ag Negative Negative, Not Reported    POC Molecular Influenza B Ag Negative Negative, Not Reported     Acceptable Yes          Assessment:     1. COVID-19    2. Acute effusion of both middle ears    3. Sore throat        Plan:       COVID-19  -     SARS Coronavirus 2 Antigen, POCT Manual Read  -     POCT Influenza A/B  MOLECULAR  -     molnupiravir 200 mg capsule (EUA); Take 4 capsules (800 mg total) by mouth every 12 (twelve) hours. for 5 days  Dispense: 40 capsule; Refill: 0    Acute effusion of both middle ears    Results reviewed  I have reviewed the patient chart and pertinent past imaging/labs.  Even though patient's COVID test was negative today she has had extensive contact with her  who tested positive for COVID-19 today they have had exposure from other family members.  We will empirically treat    4 - covid risk score         Patient Instructions   You have tested positive for COVID-19 today.  Take more with her as prescribed with food, handout given.  Take tylenol/advil as needed for fever/pain. Flonase for nasal congestion. Stay hydrated, drink plenty of water. Peptobismol for upset stomach/diarrhea, not immodium.     ISOLATION  If you tested positive and do not have symptoms, you must isolate for 5 days starting on the day of the positive test. I    If you tested positive and have symptoms, you must isolate for 5 days starting on the day of the first symptoms,  not the day of the positive test.     Both the CDC and the [facility] emphasize that you do not test out of isolation.     After 5 days, if your symptoms have improved and you have not had fever on day 5, you can return to the community on day 6- NO TESTING REQUIRED!      In fact, we do not retest if you were positive in the last 90 days.    After your 5 days of isolation are completed, the CDC recommends strict mask use for the first 5 days that you come out of isolation.

## 2023-12-29 NOTE — PATIENT INSTRUCTIONS
You have tested positive for COVID-19 today.  Take more with her as prescribed with food, handout given.  Take tylenol/advil as needed for fever/pain. Flonase for nasal congestion. Stay hydrated, drink plenty of water. Peptobismol for upset stomach/diarrhea, not immodium.     ISOLATION  If you tested positive and do not have symptoms, you must isolate for 5 days starting on the day of the positive test. I    If you tested positive and have symptoms, you must isolate for 5 days starting on the day of the first symptoms,  not the day of the positive test.     Both the CDC and the [facility] emphasize that you do not test out of isolation.     After 5 days, if your symptoms have improved and you have not had fever on day 5, you can return to the community on day 6- NO TESTING REQUIRED!      In fact, we do not retest if you were positive in the last 90 days.    After your 5 days of isolation are completed, the CDC recommends strict mask use for the first 5 days that you come out of isolation.

## 2024-01-15 DIAGNOSIS — I48.19 PERSISTENT ATRIAL FIBRILLATION: Primary | ICD-10-CM

## 2024-01-16 RX ORDER — DILTIAZEM HYDROCHLORIDE 120 MG/1
120 TABLET, FILM COATED ORAL
Qty: 30 TABLET | Refills: 3 | Status: SHIPPED | OUTPATIENT
Start: 2024-01-16 | End: 2024-03-20

## 2024-01-24 DIAGNOSIS — I48.19 PERSISTENT ATRIAL FIBRILLATION: Primary | ICD-10-CM

## 2024-01-24 RX ORDER — FLECAINIDE ACETATE 100 MG/1
100 TABLET ORAL EVERY 12 HOURS
Qty: 60 TABLET | Refills: 3 | Status: SHIPPED | OUTPATIENT
Start: 2024-01-24 | End: 2024-03-20 | Stop reason: SDUPTHER

## 2024-02-21 ENCOUNTER — TELEPHONE (OUTPATIENT)
Dept: PRIMARY CARE CLINIC | Facility: CLINIC | Age: 74
End: 2024-02-21
Payer: MEDICARE

## 2024-02-21 DIAGNOSIS — R10.13 EPIGASTRIC PAIN: Primary | ICD-10-CM

## 2024-02-21 NOTE — TELEPHONE ENCOUNTER
Pt states that she had a previous stool test that was ordered, she never had a chance to submit it to to lab. Pt states she lost the previous test/kit, and now she is requesting another test be ordered, so she can submit the sample. When I check the patient chart, the only stool test I see have been ordered is the h. Pylori. Please advise if this is the correct stool test for the patient.

## 2024-02-21 NOTE — TELEPHONE ENCOUNTER
Hi- I'd need clarifcation from her too.  That was way back in 9/2022 that we ordered the H/ pylori test because she was having abdominal pains and her  had h. Pylori a the time.  Graciela seen her a bunch of times since then and we haven't discussed the pain.  Can you see if she is still having pain?  If not, then I don't think she needs to do that test.    If that is not the test she is referring to, they might have send her a colon cancer screening kit in mail (?) - but she doesn't look like she is due for that eitehr until 2026.

## 2024-02-21 NOTE — TELEPHONE ENCOUNTER
----- Message from Jj Orozco sent at 2/21/2024  3:09 PM CST -----  Contact: [phone]@[email or web address]  Good afternoon patient would like a call back to discuss if the doc can order her another stool test. Patient says the last test got lost. Please give patient a  call back  [phone]

## 2024-02-22 NOTE — TELEPHONE ENCOUNTER
I spoke with the patient and she states that it was the h pylori test that she needs to be ordered. Pt states she is having hot flashes, indigestion and bloating in the stomach. Please advise.

## 2024-02-23 ENCOUNTER — TELEPHONE (OUTPATIENT)
Dept: CARDIOLOGY | Facility: HOSPITAL | Age: 74
End: 2024-02-23
Payer: MEDICARE

## 2024-02-23 NOTE — TELEPHONE ENCOUNTER
I spoke with pt and informed her of the stool ordered being in, pt expressed understanding and all her questions were answered.

## 2024-02-27 ENCOUNTER — LAB VISIT (OUTPATIENT)
Dept: LAB | Facility: HOSPITAL | Age: 74
End: 2024-02-27
Attending: INTERNAL MEDICINE
Payer: MEDICARE

## 2024-02-27 DIAGNOSIS — R10.13 EPIGASTRIC PAIN: ICD-10-CM

## 2024-02-27 PROCEDURE — 87338 HPYLORI STOOL AG IA: CPT | Performed by: INTERNAL MEDICINE

## 2024-03-01 ENCOUNTER — TELEPHONE (OUTPATIENT)
Dept: PRIMARY CARE CLINIC | Facility: CLINIC | Age: 74
End: 2024-03-01
Payer: MEDICARE

## 2024-03-01 NOTE — TELEPHONE ENCOUNTER
----- Message from Brent Langston sent at 3/1/2024 11:22 AM CST -----  Contact: [phone]  Calling to get test results.   Name of test (lab, x-ray):   Date of test: 02/27/24  Where was the test performed: [place], Lab  Would you like a call back, or a response through your [facility] portal?:      Comments:    Please call pt back

## 2024-03-05 ENCOUNTER — TELEPHONE (OUTPATIENT)
Dept: PRIMARY CARE CLINIC | Facility: CLINIC | Age: 74
End: 2024-03-05
Payer: MEDICARE

## 2024-03-05 LAB — H PYLORI AG STL QL IA: NOT DETECTED

## 2024-03-05 NOTE — TELEPHONE ENCOUNTER
----- Message from Sandra Funk sent at 3/5/2024  2:40 PM CST -----  Contact: [phone] Patient  2TESTRESULTS    Type: Test Results    What test was performed? Labs    Who ordered the test? Dr Lebron    When and where were the test performed?  02/27/2024 [facility]    Would you like response via [facility]: Please call with the results. Thank you    Comments:

## 2024-03-08 PROBLEM — E66.01 SEVERE OBESITY (BMI 35.0-39.9) WITH COMORBIDITY: Status: ACTIVE | Noted: 2024-03-08

## 2024-03-08 PROBLEM — Z79.01 ON ANTICOAGULANT THERAPY: Status: ACTIVE | Noted: 2024-03-08

## 2024-03-08 NOTE — PROGRESS NOTES
Ms. Vazquez is a patient of Dr. Chance and was last seen in clinic 8/15/2023.      Subjective:   Patient ID:  Rosa Maria Vazquez is a 73 y.o. female who presents for follow up of Pacemaker Check  .     HPI:    Ms. Vazquez is a 73 y.o. female with HTN, obesity (s/p gastric bypass), hypothyroidism, aortic atherosclerosis, persistent AF, PPM here for follow up.    Background:    Referring Cardiologist: Deedee Li MD  Primary Care Physician: Neftaly Lebron MD    Mrs. Vazquez has a history of hypertension, obesity s/p gastric bypass, hypothyroidism, aortic atherosclerotic disease, and persistent AF. She moved to the [place] area a few years ago to be near with family. She established care with Dr. Li. AF was incidentally observed on a recent ECG during follow-up (controlled to bradycardic rates). She endorsed intermittent light-headedness. A holter monitor was ordered which showed persistent AF with ventricular rates at times in the 30s-50s corresponding to symptoms. Average ventricular rate was 44 bpm. She is no on any AV nikole blocking agents. She is referred for consideration for PPM implantation for symptomatic bradycardia. She notes fatigue x 2 months and light-headedness with an episode of near syncope.    An echocardiogram from 2021 noted preserved LV function with mild LA enlargement with impaired relaxation.    ECGs in Epic which are only from May of 2023 which show AF with controlled ventricular rates. QRS is narrow.    8/15/2023: Mrs. Vazquez is a pleasant 72 year-old woman with hypertension, obesity s/p gastric bypass, hypothyroidism, aortic atherosclerotic disease, and persistent AF with symptomatic bradycardia/slow ventricular response. Discussed PPM implantation followed by attempt at rhythm control since AF is newly diagnosed. We discussed the alternatives, benefits and risks of the procedure including pain, infection, bleeding, injury to lung causing pneumothorax requiring tube placement, injury to  heart valves, puncture of the heart leading to pericardial effusion or tamponade requiring tube drainage, heart attack, stroke and death. Discussed particulars of LBBPA region implant. She understood and desires to proceed. Consent signed.    6/19/2023: Successful implantation of PPM Dual with RV lead placed at the left bundle branch pacing area region.    7/18/2023: Cardioversion was successfully performed with restoration of normal sinus rhythm. Started on flecainide.     8/15/2023: She is now 2 mo s/p PPM implantation with LBBA pacing lead. She subsequently was cardioverted 1 mo ago and placed on flecainide 100mg BID. TIM EF 55%.  Ms. Vazquez is doing well from a device perspective with stable lead and device function. 94% LBBA pacing. LVAT <75. LBBA impedence and threshold stable in unipolar and bipolar. ECGs in bipolar and unipolar reviewed with Dr. Chance. No AF since [facility]. CHADSVASc 3 on eliquis. She is feeling significant symptom improvement.     Update (03/11/2024):    Today she says she continues to feel well. No cardiac complaints. Ms. Vazquez reports no chest pain with exertion or at rest, palpitations, SOB, FRAZIER, dizziness, or syncope.    She is currently taking eliquis 5mg BID for stroke prophylaxis and denies significant bleeding episodes. She is currently being treated with flecainide 100mg BID for rhythm control and diltiazem 120mg daily for HR control.  Kidney function is stable, with a creatinine of 1.1 on 12/21/2023.    Device Interrogation (3/11/2024) reveals an intrinsic SB (54bpm) with stable lead and device function.   LBBA testing stable: impedence unipolar 418, bipolar 589; threshold unipolar 0.75V@0.4ms, bipolar 1V@0.4ms  ECGs recorded in bipolar and unipolar and high and low outputs   No arrhythmias or treated episodes were noted. She paces 97.3% in the RA and 92.5% in the RV (LBBA). Estimated battery longevity 10.8 years.     I have personally reviewed the patient's EKG today, which shows  APVP at 68bpm. QRS is 108. QTc is 414.    Relevant Cardiac Test Results:    TIM (7/18/2023):  Normal left ventricular systolic function. The estimated ejection fraction is 55%.  Normal right ventricular systolic function.  Biatrial enlargement.  Normal appearing left atrial appendage. No thrombus is present in the appendage.  Grade 3 plaque present in the descending aorta.    Current Outpatient Medications   Medication Sig    alendronate (FOSAMAX) 70 MG tablet Take 1 tablet (70 mg total) by mouth every 7 days.    apixaban (ELIQUIS) 5 mg Tab Take 1 tablet (5 mg total) by mouth 2 (two) times daily.    calcium citrate-vitamin D3 315-200 mg (CITRACAL+D) 315 mg-5 mcg (200 unit) per tablet Take 1 tablet by mouth 2 (two) times daily.    cyanocobalamin 1,000 mcg/mL injection INJECT 1 ML (1,000 MCG TOTAL) INTO THE MUSCLE EVERY 30 DAYS.    diltiaZEM (CARDIZEM) 120 MG tablet TAKE 1 TABLET BY MOUTH ONCE DAILY    famotidine (PEPCID) 20 MG tablet Take 1 tablet (20 mg total) by mouth 2 (two) times daily. (Patient taking differently: Take 20 mg by mouth daily as needed.)    flecainide (TAMBOCOR) 100 MG Tab TAKE 1 TABLET BY MOUTH EVERY 12 HOURS    levothyroxine (SYNTHROID) 112 MCG tablet TAKE 1 TABLET BY MOUTH EVERY DAY BEFORE BREAKFAST    lisinopriL 10 MG tablet TAKE 1 TABLET BY MOUTH EVERY DAY    multivitamin capsule Take 1 capsule by mouth once daily.    rosuvastatin (CRESTOR) 10 MG tablet Take 1 tablet (10 mg total) by mouth once daily.     No current facility-administered medications for this visit.       Review of Systems   Constitutional: Negative for malaise/fatigue.   Cardiovascular:  Negative for chest pain, dyspnea on exertion, irregular heartbeat, leg swelling and palpitations.   Respiratory:  Negative for shortness of breath.    Hematologic/Lymphatic: Negative for bleeding problem.   Skin:  Negative for rash.   Musculoskeletal:  Negative for myalgias.   Gastrointestinal:  Negative for hematemesis, hematochezia and  nausea.   Genitourinary:  Negative for hematuria.   Neurological:  Negative for light-headedness.   Psychiatric/Behavioral:  Negative for altered mental status.    Allergic/Immunologic: Negative for persistent infections.       Objective:          /66   Pulse 68   Wt 81.7 kg (180 lb 1.9 oz)   BMI 30.92 kg/m²     Physical Exam  Vitals and nursing note reviewed.   Constitutional:       Appearance: Normal appearance. She is well-developed.   HENT:      Head: Normocephalic.      Nose: Nose normal.   Eyes:      Pupils: Pupils are equal, round, and reactive to light.   Cardiovascular:      Rate and Rhythm: Normal rate and regular rhythm.   Pulmonary:      Effort: No respiratory distress.      Breath sounds: Normal breath sounds.   Chest:      Comments: Device to LUCW. Incision and pocket in good repair.    Musculoskeletal:         General: Normal range of motion.   Skin:     General: Skin is warm and dry.      Findings: No erythema.   Neurological:      Mental Status: She is alert and oriented to person, place, and time.   Psychiatric:         Speech: Speech normal.         Behavior: Behavior normal.           Lab Results   Component Value Date     12/21/2023    K 4.6 12/21/2023    MG 2.3 11/19/2021    BUN 21 12/21/2023    CREATININE 1.1 12/21/2023    ALT 16 12/21/2023    AST 28 12/21/2023    HGB 13.2 12/21/2023    HCT 40.2 12/21/2023    TSH 1.290 12/21/2023    LDLCALC 65.4 12/21/2023       Recent Labs   Lab 06/19/23  0755 07/11/23  0853   INR 1.0 1.0       Assessment:     1. Cardiac pacemaker in situ - LBBA lead    2. Paroxysmal atrial fibrillation    3. Severe obesity (BMI 35.0-39.9) with comorbidity    4. Atherosclerosis of aorta    5. Essential hypertension    6. On anticoagulant therapy      Plan:     In summary, Ms. Vazquez is a 73 y.o. female with HTN, obesity (s/p gastric bypass), hypothyroidism, aortic atherosclerosis, persistent AF, PPM here for follow up.  Ms. Vazquez is doing well from a device  perspective with stable lead and device function. LBBA testing stable. ECGs to be reviewed by Dr. Chance.  No arrhythmia noted. On flecainide. Narrow QRS on presenting. On eliquis for CVA prophylaxis. She continues to feel great. Lost ~15lbs since last clinic visit.    Continue current medication regimen and device settings.   Follow up in device clinic as scheduled.   Follow up in EP clinic in 6 mo, sooner as needed.     *A copy of this note has been sent to Dr. Chance*    Follow up in about 6 months (around 9/11/2024).    ------------------------------------------------------------------    ARIANE Campos, NP-C  Cardiac Electrophysiology

## 2024-03-11 ENCOUNTER — CLINICAL SUPPORT (OUTPATIENT)
Dept: CARDIOLOGY | Facility: HOSPITAL | Age: 74
End: 2024-03-11
Attending: INTERNAL MEDICINE
Payer: MEDICARE

## 2024-03-11 ENCOUNTER — OFFICE VISIT (OUTPATIENT)
Dept: ELECTROPHYSIOLOGY | Facility: CLINIC | Age: 74
End: 2024-03-11
Payer: MEDICARE

## 2024-03-11 ENCOUNTER — HOSPITAL ENCOUNTER (OUTPATIENT)
Dept: CARDIOLOGY | Facility: CLINIC | Age: 74
Discharge: HOME | End: 2024-03-11
Payer: MEDICARE

## 2024-03-11 VITALS
HEART RATE: 68 BPM | SYSTOLIC BLOOD PRESSURE: 114 MMHG | WEIGHT: 180.13 LBS | BODY MASS INDEX: 30.92 KG/M2 | DIASTOLIC BLOOD PRESSURE: 66 MMHG

## 2024-03-11 DIAGNOSIS — I70.0 ATHEROSCLEROSIS OF AORTA: ICD-10-CM

## 2024-03-11 DIAGNOSIS — I10 ESSENTIAL HYPERTENSION: ICD-10-CM

## 2024-03-11 DIAGNOSIS — I48.91 ATRIAL FIBRILLATION WITH SLOW VENTRICULAR RESPONSE: ICD-10-CM

## 2024-03-11 DIAGNOSIS — Z95.0 PRESENCE OF CARDIAC PACEMAKER: ICD-10-CM

## 2024-03-11 DIAGNOSIS — R00.1 BRADYCARDIA, UNSPECIFIED: ICD-10-CM

## 2024-03-11 DIAGNOSIS — Z79.01 ON ANTICOAGULANT THERAPY: ICD-10-CM

## 2024-03-11 DIAGNOSIS — I48.0 PAROXYSMAL ATRIAL FIBRILLATION: ICD-10-CM

## 2024-03-11 DIAGNOSIS — R00.1 SYMPTOMATIC BRADYCARDIA: ICD-10-CM

## 2024-03-11 DIAGNOSIS — E66.9 MILD OBESITY: ICD-10-CM

## 2024-03-11 DIAGNOSIS — I44.0 ATRIOVENTRICULAR BLOCK, FIRST DEGREE: ICD-10-CM

## 2024-03-11 DIAGNOSIS — Z95.0 CARDIAC PACEMAKER IN SITU: Primary | ICD-10-CM

## 2024-03-11 LAB
OHS QRS DURATION: 108 MS
OHS QTC CALCULATION: 414 MS

## 2024-03-11 PROCEDURE — 99214 OFFICE O/P EST MOD 30 MIN: CPT | Mod: S$PBB,,, | Performed by: NURSE PRACTITIONER

## 2024-03-11 PROCEDURE — 93280 PM DEVICE PROGR EVAL DUAL: CPT

## 2024-03-11 PROCEDURE — 93010 ELECTROCARDIOGRAM REPORT: CPT | Mod: S$PBB,,, | Performed by: INTERNAL MEDICINE

## 2024-03-11 PROCEDURE — 93005 ELECTROCARDIOGRAM TRACING: CPT | Mod: PBBFAC,59 | Performed by: INTERNAL MEDICINE

## 2024-03-11 PROCEDURE — 99213 OFFICE O/P EST LOW 20 MIN: CPT | Mod: PBBFAC,25 | Performed by: NURSE PRACTITIONER

## 2024-03-11 PROCEDURE — 93280 PM DEVICE PROGR EVAL DUAL: CPT | Mod: 26,,, | Performed by: INTERNAL MEDICINE

## 2024-03-11 PROCEDURE — 99999 PR PBB SHADOW E&M-EST. PATIENT-LVL III: CPT | Mod: PBBFAC,,, | Performed by: NURSE PRACTITIONER

## 2024-03-11 RX ORDER — BETAMETHASONE DIPROPIONATE 0.5 MG/G
OINTMENT TOPICAL
COMMUNITY

## 2024-03-11 NOTE — Clinical Note
I left the ECGs outside your office to review. They looked stable but let me know if you have any concerns. Thx.

## 2024-03-13 LAB
OHS QRS DURATION: 134 MS
OHS QTC CALCULATION: 430 MS

## 2024-03-14 ENCOUNTER — TELEPHONE (OUTPATIENT)
Dept: CARDIOLOGY | Facility: CLINIC | Age: 74
End: 2024-03-14
Payer: MEDICARE

## 2024-03-14 NOTE — TELEPHONE ENCOUNTER
Spoke with patient had to cancel and rescheule appt. she was okay with new date and time           ----- Message from Kari Prasad sent at 3/14/2024 11:30 AM CDT -----  Regarding: appt chg  Pt needs to change the time of the appt you just offered her and can be reached at  [phone] thx

## 2024-03-17 ENCOUNTER — CLINICAL SUPPORT (OUTPATIENT)
Dept: CARDIOLOGY | Facility: HOSPITAL | Age: 74
End: 2024-03-17
Attending: INTERNAL MEDICINE
Payer: MEDICARE

## 2024-03-17 DIAGNOSIS — Z95.0 PRESENCE OF CARDIAC PACEMAKER: ICD-10-CM

## 2024-03-17 DIAGNOSIS — I44.0 ATRIOVENTRICULAR BLOCK, FIRST DEGREE: ICD-10-CM

## 2024-03-17 DIAGNOSIS — R00.1 BRADYCARDIA, UNSPECIFIED: ICD-10-CM

## 2024-03-20 ENCOUNTER — OFFICE VISIT (OUTPATIENT)
Dept: CARDIOLOGY | Facility: CLINIC | Age: 74
End: 2024-03-20
Payer: MEDICARE

## 2024-03-20 VITALS
WEIGHT: 178 LBS | RESPIRATION RATE: 20 BRPM | SYSTOLIC BLOOD PRESSURE: 108 MMHG | HEIGHT: 64 IN | BODY MASS INDEX: 30.39 KG/M2 | HEART RATE: 94 BPM | DIASTOLIC BLOOD PRESSURE: 72 MMHG

## 2024-03-20 DIAGNOSIS — I10 ESSENTIAL HYPERTENSION: ICD-10-CM

## 2024-03-20 DIAGNOSIS — E66.01 SEVERE OBESITY (BMI 35.0-39.9) WITH COMORBIDITY: ICD-10-CM

## 2024-03-20 DIAGNOSIS — Z95.0 CARDIAC PACEMAKER IN SITU: Primary | ICD-10-CM

## 2024-03-20 DIAGNOSIS — I87.2 VENOUS INSUFFICIENCY: ICD-10-CM

## 2024-03-20 DIAGNOSIS — I48.20 CHRONIC ATRIAL FIBRILLATION: ICD-10-CM

## 2024-03-20 DIAGNOSIS — I48.19 PERSISTENT ATRIAL FIBRILLATION: ICD-10-CM

## 2024-03-20 DIAGNOSIS — Z95.0 CARDIAC PACEMAKER IN SITU: ICD-10-CM

## 2024-03-20 DIAGNOSIS — I70.0 ATHEROSCLEROSIS OF AORTA: ICD-10-CM

## 2024-03-20 DIAGNOSIS — I48.0 PAROXYSMAL ATRIAL FIBRILLATION: Primary | ICD-10-CM

## 2024-03-20 PROCEDURE — 99999 PR PBB SHADOW E&M-EST. PATIENT-LVL III: CPT | Mod: PBBFAC,,, | Performed by: INTERNAL MEDICINE

## 2024-03-20 PROCEDURE — 99213 OFFICE O/P EST LOW 20 MIN: CPT | Mod: PBBFAC | Performed by: INTERNAL MEDICINE

## 2024-03-20 PROCEDURE — 99214 OFFICE O/P EST MOD 30 MIN: CPT | Mod: S$PBB,,, | Performed by: INTERNAL MEDICINE

## 2024-03-20 RX ORDER — LISINOPRIL 10 MG/1
10 TABLET ORAL DAILY
Qty: 90 TABLET | Refills: 3 | Status: SHIPPED | OUTPATIENT
Start: 2024-03-20

## 2024-03-20 RX ORDER — ROSUVASTATIN CALCIUM 20 MG/1
20 TABLET, COATED ORAL DAILY
Qty: 90 TABLET | Refills: 3 | Status: SHIPPED | OUTPATIENT
Start: 2024-03-20

## 2024-03-20 RX ORDER — FLECAINIDE ACETATE 100 MG/1
100 TABLET ORAL EVERY 12 HOURS
Qty: 180 TABLET | Refills: 3 | Status: SHIPPED | OUTPATIENT
Start: 2024-03-20 | End: 2024-05-09 | Stop reason: SDUPTHER

## 2024-03-20 RX ORDER — DILTIAZEM HYDROCHLORIDE 120 MG/1
120 CAPSULE, EXTENDED RELEASE ORAL DAILY
Qty: 90 CAPSULE | Refills: 3 | Status: SHIPPED | OUTPATIENT
Start: 2024-03-20 | End: 2025-03-20

## 2024-03-20 NOTE — PROGRESS NOTES
HISTORY:    73-year-old female with a history of paroxysmal atrial fibrillation, status post pacemaker 2023 for symptomatic bradycardia, hypertension, venous insufficiency, recurrent right lower extremity cellulitis, hypothyroidism, obesity s/p gastric bypass '03 and intraocular migraines presenting for follow-up.    Atrial fibrillation incidentally found on ecg in early '23 and then on further questioning and holter monitor appeared to have symptomatic bradycardia.  Now status post pacemaker placement.    Patient denies chest pain, shortness of breath, or dyspnea on exertion at this time feels much improved post pacemaker implant.    Activity levels improved. Walking 3x/week 2-4 miles. Down 40 pounds since January '23.     B LE edema has improved with weight loss. Uses compression stockings.    She tolerates diltiazem 120 x 1, flecainide 100 x 2, lisinopril 10 x 1, rosuvastatin 10 x 1, and apixaban 5 x 2. Bps controlled on home log.     The patient denies any previous history of myocardial infarction, coronary artery disease, peripheral arterial disease, stroke, congestive heart failure, or cardiomyopathy.    Patient moved here in '22 from [place]. She is a retired ICU nurse. She moved here to be with her children and grandkids who are [employer] graduates.      PHYSICAL EXAM:    Vitals:    03/20/24 0942   BP: 108/72   Pulse: 94   Resp: 20     NAD, A+Ox3.  No jvd, no bruit.  Irreg, irreg, nml s1,s2. No murmurs.  CTA B no wheezes or crackles.  2+ B radial and 1+ B DP/PT. B LE edema mild with chronic lipodermatosclerosis L>R. No erythema or TTP. No open wounds.    LABS/STUDIES (imaging reviewed during clinic visit):    December 2023 CBC and CMP normal.  /HDL 57/LDL 65/TG 53.  A1c and TSH normal.    EKG March 2024 AV paced.  August 2023 av dual paced.  May 2023 atrial fibrillation. No Qs/Sts.   Device check August 2023 87% RA 94% RV pacing.  AFib episode July 19th with no ventricular arrhythmias.  Holter May  2023 atrial fibrillation with average heart rate of 44 beats per minute.  Symptomatic episodes corresponding with heart rates in the range of 35-55.  Slowest ventricular response was 30 beats per minute and the longest pause was 3 seconds, none of which corresponded to symptoms.  TTE 2021 normal LV size and function with EF 65%.  Grade 1 diastology.  Basal septal hypertrophy with mild Marvel no significant obstruction.  CVP 3.  TIM July 2023 Nml LVEF w no significant valve ds. No LA appendage clot s/p CV.   Venous duplex December 2022 shows no evidence of DVT bilaterally.  Bilateral GSV reflux is present. L GSV SVT.   Carotid May 2023 No significant ICA ds bilaterally.     ASSESSMENT & PLAN:    1. Paroxysmal atrial fibrillation    2. Severe obesity (BMI 35.0-39.9) with comorbidity    3. Cardiac pacemaker in situ - LBBA lead    4. Venous insufficiency    5. Essential hypertension    6. Atherosclerosis of aorta    7. Persistent atrial fibrillation    8. Chronic atrial fibrillation                Orders Placed This Encounter    rosuvastatin (CRESTOR) 20 MG tablet    lisinopriL 10 MG tablet    flecainide (TAMBOCOR) 100 MG Tab    diltiaZEM (DILACOR XR) 120 MG CDCR    apixaban (ELIQUIS) 5 mg Tab            Incidental diagnosis of afib in '23 likely long standing s/p CV and PM implant for symptomatic bradycardia on diltiazem and flecainide. CHADSVASC 4 and tolerating apixiban 5x2.     Does have a lot of bruising and bleeding 2/2 eczema. Has a lot of superficial wounds on her hands from dry skin/scratching with bleeding. Can consider Watchman at next meeting with Dr. Chance.     Aortic atherosclerosis noted and tolerating rosuvastatin 10x1 for CV risk reduction. Increase to 20x1.     Bps controlled on diltiazem 120x1 and lisinopril 10x1.    Pt has C4b venous insufficiency bilaterally. Continue compression stockings and lifestyle modifications. Recommend continued exercise and increase of walking distance. Doing great with  weight loss.     Follow up in about 1 year (around 3/20/2025).      Deedee Li MD

## 2024-03-23 ENCOUNTER — OFFICE VISIT (OUTPATIENT)
Dept: URGENT CARE | Facility: CLINIC | Age: 74
End: 2024-03-23
Payer: MEDICARE

## 2024-03-23 VITALS
HEART RATE: 63 BPM | DIASTOLIC BLOOD PRESSURE: 80 MMHG | OXYGEN SATURATION: 98 % | TEMPERATURE: 98 F | WEIGHT: 178 LBS | RESPIRATION RATE: 18 BRPM | SYSTOLIC BLOOD PRESSURE: 123 MMHG | HEIGHT: 64 IN | BODY MASS INDEX: 30.39 KG/M2

## 2024-03-23 DIAGNOSIS — R23.3 PETECHIAE: Primary | ICD-10-CM

## 2024-03-23 PROCEDURE — 99213 OFFICE O/P EST LOW 20 MIN: CPT | Mod: S$GLB,,, | Performed by: FAMILY MEDICINE

## 2024-03-23 NOTE — PROGRESS NOTES
"Subjective:      Patient ID: Rosa Maria Vazquez is a 73 y.o. female.    Vitals:  height is 5' 4" (1.626 m) and weight is 80.7 kg (178 lb). Her oral temperature is 97.8 °F (36.6 °C). Her blood pressure is 123/80 and her pulse is 63. Her respiration is 18 and oxygen saturation is 98%.     Chief Complaint: Emesis    This is a 73 y.o. female who presents today with a chief complaint of vomiting and  facial rash. Pt states the symptoms started on yesterday.   Provider note begins below:  Past Medical History:  No date: Anticoagulant long-term use  No date: Atrial fibrillation  No date: Hypertension  No date: Thyroid disease   Pt with above pmh, she says she had some NV yesterday about 3-4 episodes of violent vomiting. 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  She reports she was able to tolerate PO today, denies any abd pain, she was concered about the rash on her face. She ate tuna yesterday.     Emesis   This is a new problem. The current episode started yesterday. The problem occurs 2 to 4 times per day. The problem has been resolved. There has been no fever. Pertinent negatives include no abdominal pain, arthralgias, chest pain, chills, coughing, decreased urine volume, diarrhea, dizziness, fever, headaches, myalgias, sweats, URI or weight loss. She has tried nothing for the symptoms.       Constitution: Negative for activity change, appetite change, chills, sweating, fatigue, fever and unexpected weight change.   Cardiovascular:  Negative for chest pain.   Respiratory:  Negative for cough.    Gastrointestinal:  Positive for vomiting (resolved). Negative for abdominal pain, abdominal bloating, history of abdominal surgery, nausea, constipation, diarrhea, bright red blood in stool, dark colored stools, rectal bleeding, rectal pain, hemorrhoids, heartburn and bowel incontinence.   Genitourinary:  Negative for urine decreased.   Musculoskeletal:  Negative for joint pain and muscle ache.   Skin:  Positive for " rash.   Neurological:  Negative for dizziness and headaches.      Objective:     Physical Exam   Constitutional: She is oriented to person, place, and time. She appears well-developed.  Non-toxic appearance. She does not appear ill. No distress.   HENT:   Head: Normocephalic and atraumatic.   Ears:   Right Ear: External ear normal.   Left Ear: External ear normal.   Nose: Nose normal.   Mouth/Throat: Oropharynx is clear and moist.   Eyes: Conjunctivae, EOM and lids are normal. Pupils are equal, round, and reactive to light.   Neck: Trachea normal and phonation normal. Neck supple.   Abdominal: Bowel sounds are normal. Soft. There is no abdominal tenderness. There is no rebound, no guarding, no tenderness at McBurney's point, negative Rodríugez's sign, no left CVA tenderness, negative Rovsing's sign, negative psoas sign, no right CVA tenderness and negative obturator sign.   Musculoskeletal: Normal range of motion.         General: Normal range of motion.   Neurological: She is alert and oriented to person, place, and time.   Skin: Skin is warm, dry, intact, not diaphoretic and rash (facial and periorbital petechiae).   Psychiatric: Her speech is normal and behavior is normal. Judgment and thought content normal.   Nursing note and vitals reviewed.      Assessment:     1. Petechiae        Plan:     Reassurance, pt pleased, she will ctm, f/u with pcp as needed, likely from vomiting yesterday    Discussed results/diagnosis/plan with patient in clinic. Strict precautions given to patient to monitor for worsening signs and symptoms. Advised to follow up with PCP or specialist.    Explained side effects of medications prescribed with patient and informed him/her to discontinue use if he/she has any side effects and to inform UC or PCP if this occurs. All questions answered. Strict ED verses clinic return precautions stressed and given in depth. Advised if symptoms worsens of fail to improve he/she should go to the Emergency  Room. Discharge and follow-up instructions given verbally/printed with the patient who expressed understanding and willingness to comply with my recommendations. Patient voiced understanding and in agreement with current treatment plan. Patient exits the exam room in no acute distress. Conversant and engaged during discharge discussion, verbalized understanding.      Petechiae                Patient Instructions   General Discharge Instructions   PLEASE READ YOUR DISCHARGE INSTRUCTIONS ENTIRELY AS IT CONTAINS IMPORTANT INFORMATION.  If you were prescribed a narcotic or controlled medication, do not drive or operate heavy equipment or machinery while taking these medications.  If you were prescribed antibiotics, please take them to completion.  You must understand that you've received an Urgent Care treatment only and that you may be released before all your medical problems are known or treated. You, the patient, will arrange for follow up care as instructed.    OVER THE COUNTER RECOMMENDATIONS/SUGGESTIONS.    Make sure to stay well hydrated.    Use Nasal Saline to mechanically move any post nasal drip from your eustachian tube or from the back of your throat.    Use warm salt water gargles to ease your throat pain. Warm salt water gargles as needed for sore throat- 1/2 tsp salt to 1 cup warm water, gargle as desired.    Use an antihistamine such as Claritin, Zyrtec or Allegra to dry you out.    Use pseudoephedrine (behind the counter) to decongest. Pseudoephedrine 30 mg up to 240 mg /day. It can raise your blood pressure and give you palpitations.    Use mucinex (guaifenesin) to break up mucous up to 2400mg/day to loosen any mucous.    The mucinex DM pill has a cough suppressant that can be sedating. It can be used at night to stop the tickle at the back of your throat.    You can use Mucinex D (it has guaifenesin and a high dose of pseudoephedrine) in the mornings to help decongest.    Use Afrin in each nare for  no longer than 3 days, as it is addictive. It can also dry out your mucous membranes and cause elevated blood pressure. This is especially useful if you are flying.    Use Flonase 1-2 sprays/nostril per day. It is a local acting steroid nasal spray, if you develop a bloody nose, stop using the medication immediately.    Sometimes Nyquil at night is beneficial to help you get some rest, however it is sedating and it does have an antihistamine, and tylenol.    Honey is a natural cough suppressant that can be used.    Tylenol up to 4,000 mg a day is safe for short periods and can be used for body aches, pain, and fever. However in high doses and prolonged use it can cause liver irritation.    Ibuprofen is a non-steroidal anti-inflammatory that can be used for body aches, pain, and fever.However it can also cause stomach irritation if over used.     Follow up with your PCP or specialty clinic as instructed in the next 2-3 days if not improved or as needed. You can call [phone] to schedule an appointment with appropriate provider.      If you condition worsens, we recommend that you receive another evaluation at the emergency room immediately or contact your primary medical clinic's after hours call service to discuss your concerns.      Please return here or go to the Emergency Department for any concerns or worsening condition.   You can also call [phone] to schedule an appointment with the appropriate provider.    Please return here or go to the Emergency Department for any concerns or worsening of condition.    Thank you for choosing [facility]!    Our goal in the Urgent Care is to always provide outstanding medical care. You may receive a survey by mail or e-mail in the next week regarding your experience today. We would greatly appreciate you completing and returning the survey. Your feedback provides us with a way to recognize our staff who provide very good care, and it helps us learn how  to improve when your experience was below our aspiration of excellence.      We appreciate you trusting us with your medical care. We hope you feel better soon. We will be happy to take care of you for all of your future medical needs.    Sincerely,    VIC Pierre

## 2024-03-23 NOTE — PATIENT INSTRUCTIONS
General Discharge Instructions   PLEASE READ YOUR DISCHARGE INSTRUCTIONS ENTIRELY AS IT CONTAINS IMPORTANT INFORMATION.  If you were prescribed a narcotic or controlled medication, do not drive or operate heavy equipment or machinery while taking these medications.  If you were prescribed antibiotics, please take them to completion.  You must understand that you've received an Urgent Care treatment only and that you may be released before all your medical problems are known or treated. You, the patient, will arrange for follow up care as instructed.    OVER THE COUNTER RECOMMENDATIONS/SUGGESTIONS.    Make sure to stay well hydrated.    Use Nasal Saline to mechanically move any post nasal drip from your eustachian tube or from the back of your throat.    Use warm salt water gargles to ease your throat pain. Warm salt water gargles as needed for sore throat- 1/2 tsp salt to 1 cup warm water, gargle as desired.    Use an antihistamine such as Claritin, Zyrtec or Allegra to dry you out.    Use pseudoephedrine (behind the counter) to decongest. Pseudoephedrine 30 mg up to 240 mg /day. It can raise your blood pressure and give you palpitations.    Use mucinex (guaifenesin) to break up mucous up to 2400mg/day to loosen any mucous.    The mucinex DM pill has a cough suppressant that can be sedating. It can be used at night to stop the tickle at the back of your throat.    You can use Mucinex D (it has guaifenesin and a high dose of pseudoephedrine) in the mornings to help decongest.    Use Afrin in each nare for no longer than 3 days, as it is addictive. It can also dry out your mucous membranes and cause elevated blood pressure. This is especially useful if you are flying.    Use Flonase 1-2 sprays/nostril per day. It is a local acting steroid nasal spray, if you develop a bloody nose, stop using the medication immediately.    Sometimes Nyquil at night is beneficial to help you get some rest, however it is sedating and it  does have an antihistamine, and tylenol.    Honey is a natural cough suppressant that can be used.    Tylenol up to 4,000 mg a day is safe for short periods and can be used for body aches, pain, and fever. However in high doses and prolonged use it can cause liver irritation.    Ibuprofen is a non-steroidal anti-inflammatory that can be used for body aches, pain, and fever.However it can also cause stomach irritation if over used.     Follow up with your PCP or specialty clinic as instructed in the next 2-3 days if not improved or as needed. You can call [phone] to schedule an appointment with appropriate provider.      If you condition worsens, we recommend that you receive another evaluation at the emergency room immediately or contact your primary medical clinic's after hours call service to discuss your concerns.      Please return here or go to the Emergency Department for any concerns or worsening condition.   You can also call [phone] to schedule an appointment with the appropriate provider.    Please return here or go to the Emergency Department for any concerns or worsening of condition.    Thank you for choosing [facility]!    Our goal in the Urgent Care is to always provide outstanding medical care. You may receive a survey by mail or e-mail in the next week regarding your experience today. We would greatly appreciate you completing and returning the survey. Your feedback provides us with a way to recognize our staff who provide very good care, and it helps us learn how to improve when your experience was below our aspiration of excellence.      We appreciate you trusting us with your medical care. We hope you feel better soon. We will be happy to take care of you for all of your future medical needs.    Sincerely,    VIC Pierre

## 2024-03-25 ENCOUNTER — TELEPHONE (OUTPATIENT)
Dept: PRIMARY CARE CLINIC | Facility: CLINIC | Age: 74
End: 2024-03-25
Payer: MEDICARE

## 2024-03-25 DIAGNOSIS — N18.31 STAGE 3A CHRONIC KIDNEY DISEASE: Primary | ICD-10-CM

## 2024-03-25 NOTE — TELEPHONE ENCOUNTER
----- Message from Desi Fofana sent at 3/25/2024  1:12 PM CDT -----  Contact: [phone]  Pt is requesting referral to Nephrology due to stage 3 kidney disease. She went to urgent care over the weekend and was informed of this. She knows she has been having a high GFR. Please call pt when completed. Thanks

## 2024-03-26 LAB
OHS CV AF BURDEN PERCENT: < 1
OHS CV AF BURDEN PERCENT: < 1
OHS CV DC REMOTE DEVICE TYPE: NORMAL
OHS CV RV PACING PERCENT: 92.25 %
OHS CV RV PACING PERCENT: 92.5 %
OHS CV RV PACING PERCENT: 93.72 %

## 2024-04-01 ENCOUNTER — OFFICE VISIT (OUTPATIENT)
Dept: NEPHROLOGY | Facility: CLINIC | Age: 74
End: 2024-04-01
Payer: MEDICARE

## 2024-04-01 VITALS
OXYGEN SATURATION: 99 % | SYSTOLIC BLOOD PRESSURE: 116 MMHG | WEIGHT: 183.19 LBS | BODY MASS INDEX: 31.45 KG/M2 | DIASTOLIC BLOOD PRESSURE: 79 MMHG | HEART RATE: 94 BPM

## 2024-04-01 DIAGNOSIS — N18.31 STAGE 3A CHRONIC KIDNEY DISEASE: Primary | ICD-10-CM

## 2024-04-01 DIAGNOSIS — I10 HYPERTENSION, UNSPECIFIED TYPE: ICD-10-CM

## 2024-04-01 PROCEDURE — 99999 PR PBB SHADOW E&M-EST. PATIENT-LVL III: CPT | Mod: PBBFAC,,, | Performed by: INTERNAL MEDICINE

## 2024-04-01 PROCEDURE — 99203 OFFICE O/P NEW LOW 30 MIN: CPT | Mod: S$PBB,,, | Performed by: INTERNAL MEDICINE

## 2024-04-01 PROCEDURE — 99213 OFFICE O/P EST LOW 20 MIN: CPT | Mod: PBBFAC | Performed by: INTERNAL MEDICINE

## 2024-04-01 NOTE — PROGRESS NOTES
"HPI  This 73 y.o. y/o female with PMH of Afib on fleicainide s/p PPM 06/2023, HTN, hypothyroidism came in for CKD.    Renal history  Creatinine   Date Value Ref Range Status   12/21/2023 1.1 0.5 - 1.4 mg/dL Final   07/11/2023 1.0 0.5 - 1.4 mg/dL Final   05/23/2023 1.2 0.5 - 1.4 mg/dL Final   09/09/2022 1.3 0.5 - 1.4 mg/dL Final   11/19/2021 1.2 0.5 - 1.4 mg/dL Final   Cr 1.1-1.3 since 2021  No results found for: "UTPCR"  Has been drinking with 50-55 oz of fluid a day  Not taking NSAIDs  Father diagnosed with CKD in 70s, not on RRT    HTN  BP this visit 116/79 mmHg  BP at home 110s/70s  Current medication: diltiazem 120 mg daily, lisinopril 10 mg daily  Trying to be compliant with low salt diet  Lost 40 Ibs (intentional)    No DM  Lab Results   Component Value Date    HGBA1C 5.5 05/23/2023       Past Medical History:   Diagnosis Date    Anticoagulant long-term use     Atrial fibrillation     Hypertension     Thyroid disease        Past Surgical History:   Procedure Laterality Date    A-V CARDIAC PACEMAKER INSERTION N/A 6/19/2023    Procedure: INSERTION, CARDIAC PACEMAKER, DUAL CHAMBER;  Surgeon: Zeke Chance MD;  Location: [facility] EP LAB;  Service: Cardiology;  Laterality: N/A;  SB/AF, Dual PPM with LBAP lead, MDT, MAC, [place], 3 Prep    ECHOCARDIOGRAM,TRANSESOPHAGEAL N/A 7/18/2023    Procedure: Transesophageal echo (TIM) intra-procedure log documentation;  Surgeon: Randy Allan MD;  Location: [facility] EP LAB;  Service: Cardiology;  Laterality: N/A;    EYE SURGERY      GASTRIC BYPASS      TREATMENT OF CARDIAC ARRHYTHMIA N/A 7/18/2023    Procedure: Cardioversion or Defibrillation;  Surgeon: Zeke Chance MD;  Location: [facility] EP LAB;  Service: Cardiology;  Laterality: N/A;  AF, TIM, DCCV, MAC, [place], 3 Prep** MDT PPM in situ**       Review of patient's allergies indicates:   Allergen Reactions    Lopressor [metoprolol tartrate] Anaphylaxis    Nickel Dermatitis and Itching    Ondansetron Other (See Comments)     Double vision   "         Social History     Socioeconomic History    Marital status:    Tobacco Use    Smoking status: Former     Current packs/day: 0.00     Average packs/day: 1 pack/day for 10.0 years (10.0 ttl pk-yrs)     Types: Cigarettes     Start date:      Quit date:      Years since quittin.2     Passive exposure: Never    Smokeless tobacco: Never   Substance and Sexual Activity    Alcohol use: Never    Drug use: Never    Sexual activity: Not Currently     Partners: Male     Comment:  (no interest)     Social Determinants of Health     Financial Resource Strain: High Risk (2023)    Overall Financial Resource Strain (CARDIA)     Difficulty of Paying Living Expenses: Hard   Food Insecurity: No Food Insecurity (2023)    Hunger Vital Sign     Worried About Running Out of Food in the Last Year: Never true     Ran Out of Food in the Last Year: Never true   Transportation Needs: No Transportation Needs (2023)    PRAPARE - Transportation     Lack of Transportation (Medical): No     Lack of Transportation (Non-Medical): No   Physical Activity: Sufficiently Active (2023)    Exercise Vital Sign     Days of Exercise per Week: 3 days     Minutes of Exercise per Session: 50 min   Stress: Stress Concern Present (2023)    [place] [employer] - Occupational Stress Questionnaire     Feeling of Stress : Rather much   Social Connections: Moderately Integrated (2023)    Social Connection and Isolation Panel [NHANES]     Frequency of Communication with Friends and Family: More than three times a week     Frequency of Social Gatherings with Friends and Family: More than three times a week     Attends Oriental orthodox Services: More than 4 times per year     Active Member of Clubs or Organizations: No     Attends Club or Organization Meetings: Never     Marital Status:    Housing Stability: Low Risk  (2023)    Housing Stability Vital Sign     Unable to Pay for  "Housing in the Last Year: No     Number of Places Lived in the Last Year: 1     Unstable Housing in the Last Year: No       Family History   Problem Relation Age of Onset    Heart disease Mother     Heart disease Father     Diabetes Sister     Protein C deficiency Sister     Protein S deficiency Sister     Rectal cancer Sister        ROS negative except listed above    PHYSICAL EXAMINATION:  Blood pressure 116/79, pulse 94, weight 83.1 kg (183 lb 3.2 oz), SpO2 99 %.  Constitutional - No acute distress  HEENT - Grossly normal  Neck - supple.   Cardiovascular - JVP normal  Respiratory - Clear  Musculoskeletal - Peripheral edema no  Dermatologic/Skin - Skin warm and dry.  No rashes.    Neurologic - No acute neurological deficit  Psychiatric - AAOx3    Assessment and Plan  1. CKD Stage 3A:     Urine Protein Creatinine Ratios:    No results found for: "UTPCR"      Acid-Base:   Lab Results   Component Value Date     12/21/2023    K 4.6 12/21/2023    CO2 26 12/21/2023     -Counseled to avoid NSAIDs  -Counseled to drink 50-55 oz a day  -Will check UA, UPCR and retroperitoneal US     2. HTN: BP goal 130/80 mmHg  -Counseled for low salt diet  -Continue diltiazem 120 mg daily, lisinopril 10 mg daily    3. Bone mineral condition:   Lab Results   Component Value Date    CALCIUM 10.1 12/21/2023    CALCIUM 10.0 07/11/2023     Vit D, 25-Hydroxy   Date Value Ref Range Status   12/21/2023 54 30 - 96 ng/mL Final     Comment:     Vitamin D deficiency.........<10 ng/mL                              Vitamin D insufficiency......10-29 ng/mL       Vitamin D sufficiency........> or equal to 30 ng/mL  Vitamin D toxicity............>100 ng/mL        Will continue to monitor    4. No Anemia:   Lab Results   Component Value Date    HGB 13.2 12/21/2023    FERRITIN 30 11/19/2021        5. No DM:  Last HbA1C   Lab Results   Component Value Date    HGBA1C 5.5 05/23/2023       6. Lipid management:   Lab Results   Component Value Date    LDLCALC " 65.4 12/21/2023   Continue statin    ESRD planing when eGFR < 15   Will consider txp referral when eGFR < 20    Follow up in 3 months

## 2024-04-08 ENCOUNTER — TELEPHONE (OUTPATIENT)
Dept: PRIMARY CARE CLINIC | Facility: CLINIC | Age: 74
End: 2024-04-08
Payer: MEDICARE

## 2024-04-08 DIAGNOSIS — Z12.31 ENCOUNTER FOR SCREENING MAMMOGRAM FOR MALIGNANT NEOPLASM OF BREAST: Primary | ICD-10-CM

## 2024-04-08 NOTE — TELEPHONE ENCOUNTER
----- Message from Desi Fofana sent at 4/8/2024 10:22 AM CDT -----  Contact: [phone]  Pt is requesting orders be entered for her yearly mammo. Please call pt when orders entered to assist with scheduling. Thanks

## 2024-04-27 ENCOUNTER — OFFICE VISIT (OUTPATIENT)
Dept: URGENT CARE | Facility: CLINIC | Age: 74
End: 2024-04-27
Payer: MEDICARE

## 2024-04-27 VITALS
DIASTOLIC BLOOD PRESSURE: 81 MMHG | BODY MASS INDEX: 31.24 KG/M2 | WEIGHT: 183 LBS | SYSTOLIC BLOOD PRESSURE: 135 MMHG | HEIGHT: 64 IN | RESPIRATION RATE: 18 BRPM | OXYGEN SATURATION: 100 % | HEART RATE: 87 BPM | TEMPERATURE: 98 F

## 2024-04-27 DIAGNOSIS — S50.869A INSECT BITE OF FOREARM, UNSPECIFIED LATERALITY, INITIAL ENCOUNTER: Primary | ICD-10-CM

## 2024-04-27 DIAGNOSIS — L29.9 PRURITUS: ICD-10-CM
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
## 2024-04-27 PROCEDURE — 99213 OFFICE O/P EST LOW 20 MIN: CPT | Mod: S$GLB,,,

## 2024-04-27 RX ORDER — DOXYCYCLINE 100 MG/1
100 CAPSULE ORAL EVERY 12 HOURS
Qty: 10 CAPSULE | Refills: 0 | Status: SHIPPED | OUTPATIENT
Start: 2024-04-27 | End: 2024-05-02

## 2024-04-27 RX ORDER — TRIAMCINOLONE ACETONIDE 1 MG/G
CREAM TOPICAL 2 TIMES DAILY
Qty: 45 G | Refills: 0 | Status: SHIPPED | OUTPATIENT
Start: 2024-04-27

## 2024-04-27 RX ORDER — HYDROXYZINE HYDROCHLORIDE 25 MG/1
25 TABLET, FILM COATED ORAL 3 TIMES DAILY PRN
Qty: 20 TABLET | Refills: 0 | Status: SHIPPED | OUTPATIENT
Start: 2024-04-27

## 2024-04-27 NOTE — PROGRESS NOTES
"Subjective:      Patient ID: Rosa Maria Vazquez is a 73 y.o. female.    Vitals:  height is 5' 4.02" (1.626 m) and weight is 83 kg (183 lb). Her tympanic temperature is 97.7 °F (36.5 °C). Her blood pressure is 135/81 and her pulse is 87. Her respiration is 18 and oxygen saturation is 100%.     Chief Complaint: Rash    This is a 73 y.o. female who presents today with a chief complaint of rash on arms and stomach. Patient was bit by fire ants on Wednesday and lesions are progressively getting worse.  Patient states she is noticed some discharge now from her lesions.  Patient presents insect bites are now red and swollen.  She has been applying anti-itch cream with mild relief.      Rash  This is a new problem. The current episode started 1 to 4 weeks ago. The problem has been gradually worsening since onset. The affected locations include the left arm and abdomen. She was exposed to an insect bite/sting. Pertinent negatives include no cough or sore throat. Past treatments include anti-itch cream. The treatment provided no relief.       Constitution: Negative for activity change, appetite change, chills and sweating.   HENT:  Negative for ear pain, ear discharge, foreign body in ear, tinnitus, sore throat and trouble swallowing.    Neck: Negative for neck pain, neck stiffness and painful lymph nodes.   Cardiovascular:  Negative for chest trauma, chest pain and leg swelling.   Eyes:  Negative for eye trauma, foreign body in eye and eye discharge.   Respiratory:  Negative for sleep apnea, chest tightness, cough and sputum production.    Gastrointestinal:  Negative for abdominal trauma, abdominal pain and abdominal bloating.   Endocrine: hair loss and cold intolerance.   Genitourinary:  Negative for dysuria, frequency and urgency.   Musculoskeletal:  Negative for pain, trauma and joint pain.   Skin:  Positive for lesion and erythema. Negative for color change, pale, rash, wound, abrasion, laceration, skin thickening/induration, " puncture wound, bruising and abscess.   Allergic/Immunologic: Negative for environmental allergies, seasonal allergies and food allergies.   Neurological:  Negative for dizziness, history of vertigo, light-headedness, disorientation and altered mental status.   Hematologic/Lymphatic: Negative for swollen lymph nodes and easy bruising/bleeding. Does not bruise/bleed easily.   Psychiatric/Behavioral:  Negative for altered mental status, disorientation and confusion.       Objective:     Physical Exam   Constitutional: She is oriented to person, place, and time. She appears well-developed.   HENT:   Head: Normocephalic and atraumatic. Head is without abrasion, without contusion and without laceration.   Ears:   Right Ear: External ear normal.   Left Ear: External ear normal.   Nose: Nose normal.   Mouth/Throat: Oropharynx is clear and moist and mucous membranes are normal.   Eyes: Conjunctivae, EOM and lids are normal. Pupils are equal, round, and reactive to light.   Neck: Trachea normal and phonation normal. Neck supple.   Cardiovascular: Normal rate, regular rhythm and normal heart sounds.   Pulmonary/Chest: Effort normal and breath sounds normal. No stridor. No respiratory distress.   Abdominal: Bowel sounds are normal. Soft.   Musculoskeletal: Normal range of motion.         General: Normal range of motion.   Neurological: She is alert and oriented to person, place, and time.   Skin: Skin is warm, dry, intact and no rash. Capillary refill takes less than 2 seconds. erythema and lesion No abrasion, No burn, No bruising and No ecchymosis   Psychiatric: Her speech is normal and behavior is normal. Judgment and thought content normal.   Nursing note and vitals reviewed.      Assessment:     1. Insect bite of forearm, unspecified laterality, initial encounter    2. Pruritus        Plan:       Insect bite of forearm, unspecified laterality, initial encounter  -     triamcinolone acetonide 0.1% (KENALOG) 0.1 % cream;  Apply topically 2 (two) times daily.  Dispense: 45 g; Refill: 0  -     doxycycline (VIBRAMYCIN) 100 MG Cap; Take 1 capsule (100 mg total) by mouth every 12 (twelve) hours. for 5 days  Dispense: 10 capsule; Refill: 0    Pruritus  -     hydrOXYzine HCL (ATARAX) 25 MG tablet; Take 1 tablet (25 mg total) by mouth 3 (three) times daily as needed for Itching.  Dispense: 20 tablet; Refill: 0

## 2024-04-27 NOTE — PATIENT INSTRUCTIONS
Take Atarax 3 times a day as needed for itching.  Apply Kenalog cream twice daily to insect bites.  Doxycycline twice daily for 5 days.    What lifestyle changes are needed?   Avoid foods that may make your skin itch.  Take extra care when you take a bath or shower.  Use lukewarm water and limit bath or shower time to 5 to 10 minutes.  Use only mild and unscented soaps and bath products.  Apply products that moisturize your skin.  Stay away from things that bother your skin. These may include household , detergents, aftershave lotions, gasoline, and other liquids.  Avoid clothes that fit too tight or are made of wool or synthetic fabrics.  Do not scratch or rub the skin. Try to be gentle and pat or tap the skin instead.  Keep your fingernails trimmed and short to avoid damage to the skin.  Use a cool compress on the skin. Dip a cloth in cold water and apply it directly to your skin. This may help it to not swell and itch.  Avoid heat and humidity for long periods.  - Rest.    - Drink plenty of fluids.    - Acetaminophen (tylenol) or Ibuprofen (advil,motrin) as directed as needed for fever/pain. Avoid tylenol if you have a history of liver disease. Do not take ibuprofen if you have a history of GI bleeding, kidney disease, or if you take blood thinners.     - Follow up with your PCP or specialty clinic as directed in the next 1-2 weeks if not improved or as needed.  You can call [phone] to schedule an appointment with the appropriate provider.    - Go to the ER or seek medical attention immediately if you develop new or worsening symptoms.     - You must understand that you have received an Urgent Care treatment only and that you may be released before all of your medical problems are known or treated.   - You, the patient, will arrange for follow up care as instructed.   - If your condition worsens or fails to improve we recommend that you receive another evaluation at the ER immediately or contact  your PCP to discuss your concerns or return here.

## 2024-05-09 DIAGNOSIS — I48.19 PERSISTENT ATRIAL FIBRILLATION: ICD-10-CM

## 2024-05-09 RX ORDER — FLECAINIDE ACETATE 100 MG/1
100 TABLET ORAL EVERY 12 HOURS
Qty: 180 TABLET | Refills: 3 | Status: SHIPPED | OUTPATIENT
Start: 2024-05-09

## 2024-05-20 ENCOUNTER — CLINICAL SUPPORT (OUTPATIENT)
Dept: INTERNAL MEDICINE | Facility: CLINIC | Age: 74
End: 2024-05-20
Payer: MEDICARE

## 2024-05-20 DIAGNOSIS — I10 ESSENTIAL HYPERTENSION: Primary | ICD-10-CM

## 2024-05-20 PROCEDURE — 91322 SARSCOV2 VAC 50 MCG/0.5ML IM: CPT | Mod: PBBFAC

## 2024-05-20 PROCEDURE — 90480 ADMN SARSCOV2 VAC 1/ONLY CMP: CPT | Mod: PBBFAC

## 2024-05-20 PROCEDURE — 99999PBSHW COVID-19 VAC, MRNA 2023 (MODERNA)(PF) 50 MCG/0.5 ML IM SUSR (12+): Mod: PBBFAC,,,

## 2024-06-06 ENCOUNTER — HOSPITAL ENCOUNTER (OUTPATIENT)
Dept: RADIOLOGY | Facility: HOSPITAL | Age: 74
Discharge: HOME | End: 2024-06-06
Attending: INTERNAL MEDICINE
Payer: MEDICARE

## 2024-06-06 DIAGNOSIS — Z12.31 ENCOUNTER FOR SCREENING MAMMOGRAM FOR MALIGNANT NEOPLASM OF BREAST: ICD-10-CM

## 2024-06-06 PROCEDURE — 77063 BREAST TOMOSYNTHESIS BI: CPT | Mod: 26,,, | Performed by: RADIOLOGY

## 2024-06-06 PROCEDURE — 77067 SCR MAMMO BI INCL CAD: CPT | Mod: TC

## 2024-06-06 PROCEDURE — 77067 SCR MAMMO BI INCL CAD: CPT | Mod: 26,,, | Performed by: RADIOLOGY

## 2024-06-06 PROCEDURE — 77063 BREAST TOMOSYNTHESIS BI: CPT | Mod: TC

## 2024-06-16 ENCOUNTER — CLINICAL SUPPORT (OUTPATIENT)
Dept: CARDIOLOGY | Facility: HOSPITAL | Age: 74
End: 2024-06-16
Attending: INTERNAL MEDICINE
Payer: MEDICARE

## 2024-06-16 DIAGNOSIS — Z95.0 PRESENCE OF CARDIAC PACEMAKER: ICD-10-CM

## 2024-06-16 DIAGNOSIS — I44.0 ATRIOVENTRICULAR BLOCK, FIRST DEGREE: ICD-10-CM

## 2024-06-16 DIAGNOSIS — R00.1 BRADYCARDIA, UNSPECIFIED: ICD-10-CM

## 2024-06-24 ENCOUNTER — HOSPITAL ENCOUNTER (OUTPATIENT)
Dept: RADIOLOGY | Facility: HOSPITAL | Age: 74
Discharge: HOME | End: 2024-06-24
Attending: INTERNAL MEDICINE
Payer: MEDICARE

## 2024-06-24 DIAGNOSIS — N18.31 STAGE 3A CHRONIC KIDNEY DISEASE: ICD-10-CM

## 2024-06-24 PROCEDURE — 76770 US EXAM ABDO BACK WALL COMP: CPT | Mod: TC

## 2024-06-24 PROCEDURE — 76770 US EXAM ABDO BACK WALL COMP: CPT | Mod: 26,,, | Performed by: STUDENT IN AN ORGANIZED HEALTH CARE EDUCATION/TRAINING PROGRAM

## 2024-06-26 ENCOUNTER — PATIENT MESSAGE (OUTPATIENT)
Dept: NEPHROLOGY | Facility: CLINIC | Age: 74
End: 2024-06-26
Payer: MEDICARE

## 2024-06-26 LAB
OHS CV AF BURDEN PERCENT: < 1
OHS CV DC REMOTE DEVICE TYPE: NORMAL
OHS CV RV PACING PERCENT: 98.56 %

## 2024-08-09 ENCOUNTER — LAB VISIT (OUTPATIENT)
Dept: LAB | Facility: HOSPITAL | Age: 74
End: 2024-08-09
Attending: INTERNAL MEDICINE
Payer: MEDICARE

## 2024-08-09 DIAGNOSIS — N18.31 STAGE 3A CHRONIC KIDNEY DISEASE: Primary | ICD-10-CM

## 2024-08-09 DIAGNOSIS — N18.31 STAGE 3A CHRONIC KIDNEY DISEASE: ICD-10-CM

## 2024-08-09 LAB
ALBUMIN SERPL BCP-MCNC: 3.6 G/DL (ref 3.5–5.2)
ANION GAP SERPL CALC-SCNC: 9 MMOL/L (ref 8–16)
BASOPHILS # BLD AUTO: 0.03 K/UL (ref 0–0.2)
BASOPHILS NFR BLD: 0.7 % (ref 0–1.9)
BUN SERPL-MCNC: 30 MG/DL (ref 8–23)
CALCIUM SERPL-MCNC: 9.8 MG/DL (ref 8.7–10.5)
CHLORIDE SERPL-SCNC: 107 MMOL/L (ref 95–110)
CO2 SERPL-SCNC: 22 MMOL/L (ref 23–29)
CREAT SERPL-MCNC: 1.3 MG/DL (ref 0.5–1.4)
DIFFERENTIAL METHOD BLD: ABNORMAL
EOSINOPHIL # BLD AUTO: 0.2 K/UL (ref 0–0.5)
EOSINOPHIL NFR BLD: 4 % (ref 0–8)
ERYTHROCYTE [DISTWIDTH] IN BLOOD BY AUTOMATED COUNT: 13.2 % (ref 11.5–14.5)
EST. GFR  (NO RACE VARIABLE): 44 ML/MIN/1.73 M^2
GLUCOSE SERPL-MCNC: 74 MG/DL (ref 70–110)
HCT VFR BLD AUTO: 36.8 % (ref 37–48.5)
HGB BLD-MCNC: 12 G/DL (ref 12–16)
IMM GRANULOCYTES # BLD AUTO: 0.05 K/UL (ref 0–0.04)
IMM GRANULOCYTES NFR BLD AUTO: 1.1 % (ref 0–0.5)
LYMPHOCYTES # BLD AUTO: 1.7 K/UL (ref 1–4.8)
LYMPHOCYTES NFR BLD: 36.9 % (ref 18–48)
MCH RBC QN AUTO: 29.6 PG (ref 27–31)
MCHC RBC AUTO-ENTMCNC: 32.6 G/DL (ref 32–36)
MCV RBC AUTO: 91 FL (ref 82–98)
MONOCYTES # BLD AUTO: 0.4 K/UL (ref 0.3–1)
MONOCYTES NFR BLD: 8.4 % (ref 4–15)
NEUTROPHILS # BLD AUTO: 2.2 K/UL (ref 1.8–7.7)
NEUTROPHILS NFR BLD: 48.9 % (ref 38–73)
NRBC BLD-RTO: 0 /100 WBC
PHOSPHATE SERPL-MCNC: 3.7 MG/DL (ref 2.7–4.5)
PLATELET # BLD AUTO: 162 K/UL (ref 150–450)
PMV BLD AUTO: 12.1 FL (ref 9.2–12.9)
POTASSIUM SERPL-SCNC: 4.9 MMOL/L (ref 3.5–5.1)
RBC # BLD AUTO: 4.05 M/UL (ref 4–5.4)
SODIUM SERPL-SCNC: 138 MMOL/L (ref 136–145)
WBC # BLD AUTO: 4.55 K/UL (ref 3.9–12.7)

## 2024-08-09 PROCEDURE — 36415 COLL VENOUS BLD VENIPUNCTURE: CPT | Performed by: INTERNAL MEDICINE

## 2024-08-09 PROCEDURE — 80069 RENAL FUNCTION PANEL: CPT | Performed by: INTERNAL MEDICINE

## 2024-08-09 PROCEDURE — 85025 COMPLETE CBC W/AUTO DIFF WBC: CPT | Performed by: INTERNAL MEDICINE

## 2024-08-12 NOTE — PROGRESS NOTES
HPI  This 73 y.o. y/o female with PMH of Afib on fleicainide s/p PPM 06/2023, HTN, hypothyroidism came in for CKD.    Renal history  Creatinine   Date Value Ref Range Status   08/09/2024 1.3 0.5 - 1.4 mg/dL Final   06/24/2024 1.4 0.5 - 1.4 mg/dL Final   12/21/2023 1.1 0.5 - 1.4 mg/dL Final   07/11/2023 1.0 0.5 - 1.4 mg/dL Final   05/23/2023 1.2 0.5 - 1.4 mg/dL Final   09/09/2022 1.3 0.5 - 1.4 mg/dL Final   11/19/2021 1.2 0.5 - 1.4 mg/dL Final   Cr 1.1-1.3 since 2021  Prot/Creat Ratio, Urine   Date Value Ref Range Status   08/09/2024 Unable to calculate 0.00 - 0.20 Final   06/24/2024 0.05 0.00 - 0.20 Final     Has been drinking with 50-55 oz of fluid a day  Not taking NSAIDs  Father diagnosed with CKD in 70s, not on RRT    HTN  BP this visit 109/77 mmHg  BP at home 130s/70s  Current medication: diltiazem 120 mg daily, lisinopril 10 mg daily  Trying to be compliant with low salt diet  Lost 12 Ibs c/w last visit (intentional)    No DM  Lab Results   Component Value Date    HGBA1C 5.5 05/23/2023     RP US 06/2024  FINDINGS:  Right kidney: The right kidney measures 10.7 cm. Increased echogenicity with cortical thinning. No loss of corticomedullary distinction. Resistive index measures 0.68.  No mass. No renal stone. Mild pelvicaliectasis without obvious hydronephrosis.  There are 2 simple renal cysts, the largest measuring 1.1 x 1.0 x 0.9 cm.     Left kidney: The left kidney measures 10.2 cm. Increased echogenicity with cortical thinning. No loss of corticomedullary distinction. Resistive index measures 0.68.  No mass. No renal stone. Mild pelvicaliectasis without obvious hydronephrosis.  There is a minimally complex cyst measuring 2.2 x 2.2 x 2.0 cm with a thin 2 mm septation.     The bladder is partially distended at the time of scanning and has an unremarkable appearance.     Splenic RI measures 0.64.     Impression:     Sequela of chronic medical renal disease.     Minimally complex left renal cyst.    Past Medical  History:   Diagnosis Date    Anticoagulant long-term use     Atrial fibrillation     Hypertension     Thyroid disease        Past Surgical History:   Procedure Laterality Date    A-V CARDIAC PACEMAKER INSERTION N/A 2023    Procedure: INSERTION, CARDIAC PACEMAKER, DUAL CHAMBER;  Surgeon: Zeke Chance MD;  Location: [facility] EP LAB;  Service: Cardiology;  Laterality: N/A;  SB/AF, Dual PPM with LBAP lead, MDT, MAC, AZ, 3 Prep    ECHOCARDIOGRAM,TRANSESOPHAGEAL N/A 2023    Procedure: Transesophageal echo (TIM) intra-procedure log documentation;  Surgeon: Randy Allan MD;  Location: [facility] EP LAB;  Service: Cardiology;  Laterality: N/A;    EYE SURGERY      GASTRIC BYPASS      TREATMENT OF CARDIAC ARRHYTHMIA N/A 2023    Procedure: Cardioversion or Defibrillation;  Surgeon: Zeke Chance MD;  Location: [facility] EP LAB;  Service: Cardiology;  Laterality: N/A;  AF, TIM, DCCV, MAC, AZ, 3 Prep** MDT PPM in situ**       Review of patient's allergies indicates:   Allergen Reactions    Lopressor [metoprolol tartrate] Anaphylaxis    Nickel Dermatitis and Itching    Ondansetron Other (See Comments)     Double vision           Social History     Socioeconomic History    Marital status:    Tobacco Use    Smoking status: Former     Current packs/day: 0.00     Average packs/day: 1 pack/day for 10.0 years (10.0 ttl pk-yrs)     Types: Cigarettes     Start date:      Quit date:      Years since quittin.6     Passive exposure: Never    Smokeless tobacco: Never   Substance and Sexual Activity    Alcohol use: Never    Drug use: Never    Sexual activity: Not Currently     Partners: Male     Comment:  (no interest)     Social Determinants of Health     Financial Resource Strain: High Risk (2023)    Overall Financial Resource Strain (CARDIA)     Difficulty of Paying Living Expenses: Hard   Food Insecurity: No Food Insecurity (2023)    Hunger Vital Sign     Worried About Running Out of Food in the  Last Year: Never true     Ran Out of Food in the Last Year: Never true   Transportation Needs: No Transportation Needs (9/26/2023)    PRAPARE - Transportation     Lack of Transportation (Medical): No     Lack of Transportation (Non-Medical): No   Physical Activity: Sufficiently Active (9/26/2023)    Exercise Vital Sign     Days of Exercise per Week: 3 days     Minutes of Exercise per Session: 50 min   Stress: Stress Concern Present (9/26/2023)    [employer] - Occupational Stress Questionnaire     Feeling of Stress : Rather much   Housing Stability: Low Risk  (9/26/2023)    Housing Stability Vital Sign     Unable to Pay for Housing in the Last Year: No     Number of Places Lived in the Last Year: 1     Unstable Housing in the Last Year: No       Family History   Problem Relation Name Age of Onset    Heart disease Mother      Heart disease Father      Diabetes Sister      Protein C deficiency Sister      Protein S deficiency Sister      Rectal cancer Sister         ROS negative except listed above    PHYSICAL EXAMINATION:  Blood pressure 109/77, pulse 92, weight 78 kg (171 lb 15.3 oz), SpO2 97%.  Constitutional - No acute distress  HEENT - Grossly normal  Neck - supple.   Cardiovascular - JVP normal  Respiratory - Clear  Musculoskeletal - Peripheral edema no  Dermatologic/Skin - Skin warm and dry.  No rashes.    Neurologic - No acute neurological deficit  Psychiatric - AAOx3    Assessment and Plan  1. CKD Stage 3B:     Urine Protein Creatinine Ratios:    Prot/Creat Ratio, Urine   Date Value Ref Range Status   08/09/2024 Unable to calculate 0.00 - 0.20 Final   06/24/2024 0.05 0.00 - 0.20 Final   04/01/2024 0.10 0.00 - 0.20 Final         Acid-Base:   Lab Results   Component Value Date     08/09/2024    K 4.9 08/09/2024    CO2 22 (L) 08/09/2024     -Counseled to avoid NSAIDs  -Counseled to drink 50-55 oz a day  -Will repeat US in Jan 2025 for minimally complex cyst    2. HTN: BP goal  130/80 mmHg  -Counseled for low salt diet  -Continue diltiazem 120 mg daily, lisinopril 10 mg daily  -Counseled to let me know if any low readings noted (iso continuous weight loss)    3. Secondary hyperparathyroidism   Lab Results   Component Value Date    .5 (H) 06/24/2024    CALCIUM 9.8 08/09/2024    CALCIUM 9.9 06/24/2024    PHOS 3.7 08/09/2024    PHOS 4.3 06/24/2024     Vit D, 25-Hydroxy   Date Value Ref Range Status   12/21/2023 54 30 - 96 ng/mL Final     Comment:     Vitamin D deficiency.........<10 ng/mL                              Vitamin D insufficiency......10-29 ng/mL       Vitamin D sufficiency........> or equal to 30 ng/mL  Vitamin D toxicity............>100 ng/mL     Adequate high PTH iso CKD  Will continue to monitor    4. No Anemia:   Lab Results   Component Value Date    HGB 12.0 08/09/2024    FERRITIN 30 11/19/2021        5. No DM:  Last HbA1C   Lab Results   Component Value Date    HGBA1C 5.5 05/23/2023       6. Lipid management:   Lab Results   Component Value Date    LDLCALC 65.4 12/21/2023   Continue statin    ESRD planing when eGFR < 15   Will consider txp referral when eGFR < 20    Follow up in 6 months

## 2024-08-14 ENCOUNTER — OFFICE VISIT (OUTPATIENT)
Dept: NEPHROLOGY | Facility: CLINIC | Age: 74
End: 2024-08-14
Payer: MEDICARE

## 2024-08-14 VITALS
HEART RATE: 92 BPM | BODY MASS INDEX: 29.5 KG/M2 | WEIGHT: 171.94 LBS | SYSTOLIC BLOOD PRESSURE: 109 MMHG | OXYGEN SATURATION: 97 % | DIASTOLIC BLOOD PRESSURE: 77 MMHG

## 2024-08-14 DIAGNOSIS — I10 HYPERTENSION, UNSPECIFIED TYPE: ICD-10-CM

## 2024-08-14 DIAGNOSIS — N25.81 SECONDARY HYPERPARATHYROIDISM: ICD-10-CM

## 2024-08-14 DIAGNOSIS — E78.5 HYPERLIPIDEMIA, UNSPECIFIED HYPERLIPIDEMIA TYPE: ICD-10-CM

## 2024-08-14 DIAGNOSIS — N18.32 STAGE 3B CHRONIC KIDNEY DISEASE: Primary | ICD-10-CM

## 2024-08-14 PROCEDURE — 99999 PR PBB SHADOW E&M-EST. PATIENT-LVL IV: CPT | Mod: PBBFAC,,, | Performed by: INTERNAL MEDICINE

## 2024-08-14 PROCEDURE — 99214 OFFICE O/P EST MOD 30 MIN: CPT | Mod: S$PBB,,, | Performed by: INTERNAL MEDICINE

## 2024-08-14 PROCEDURE — 99214 OFFICE O/P EST MOD 30 MIN: CPT | Mod: PBBFAC | Performed by: INTERNAL MEDICINE

## 2024-08-16 ENCOUNTER — OFFICE VISIT (OUTPATIENT)
Dept: URGENT CARE | Facility: CLINIC | Age: 74
End: 2024-08-16
Payer: MEDICARE

## 2024-08-16 VITALS
WEIGHT: 171 LBS | RESPIRATION RATE: 16 BRPM | OXYGEN SATURATION: 96 % | HEART RATE: 64 BPM | BODY MASS INDEX: 29.19 KG/M2 | TEMPERATURE: 99 F | HEIGHT: 64 IN | DIASTOLIC BLOOD PRESSURE: 63 MMHG | SYSTOLIC BLOOD PRESSURE: 93 MMHG

## 2024-08-16 DIAGNOSIS — T14.8XXA SKIN ABRASION: ICD-10-CM

## 2024-08-16 DIAGNOSIS — M79.671 FOOT PAIN, RIGHT: Primary | ICD-10-CM

## 2024-08-16 PROCEDURE — 73630 X-RAY EXAM OF FOOT: CPT | Mod: FY,RT,S$GLB, | Performed by: RADIOLOGY

## 2024-08-16 RX ORDER — MUPIROCIN 20 MG/G
OINTMENT TOPICAL 3 TIMES DAILY
Qty: 30 G | Refills: 0 | Status: SHIPPED | OUTPATIENT
Start: 2024-08-16 | End: 2024-08-23

## 2024-08-16 NOTE — PROGRESS NOTES
"Subjective:      Patient ID: Rosa Maria Vazquez is a 73 y.o. female.    Vitals:  height is 5' 4" (1.626 m) and weight is 77.6 kg (171 lb). Her oral temperature is 99 °F (37.2 °C). Her blood pressure is 93/63 and her pulse is 64. Her respiration is 16 and oxygen saturation is 96%.     Chief Complaint: Foot Injury    This is a 73 y.o. female who presents today with a chief complaint of  Foot injury. Patient thinks that she may have stuck something in her right foot. Patient has a lump on the ball of foot . Unable to bear any weight.  Site has some drainage.     Provider note begins here:  Patient thinks she stepped on something. The area on her foot is tender to touch. Unable to walk as it is painful. She noticed some drainage the other day.     Foot Injury   The incident occurred 3 to 5 days ago. The incident occurred at home. The injury mechanism was a direct blow. The pain is present in the right foot. The quality of the pain is described as aching. The pain is at a severity of 8/10. The pain is severe. The pain has been Constant since onset. It is unknown if a foreign body is present. The symptoms are aggravated by weight bearing. Treatments tried: Cleaning. The treatment provided mild relief.       Skin:  Negative for erythema.      Objective:     Physical Exam   Constitutional: She is oriented to person, place, and time. She appears well-developed.   HENT:   Head: Normocephalic and atraumatic. Head is without abrasion, without contusion and without laceration.   Ears:   Right Ear: External ear normal.   Left Ear: External ear normal.   Nose: Nose normal.   Mouth/Throat: Oropharynx is clear and moist and mucous membranes are normal.   Eyes: Conjunctivae, EOM and lids are normal. Pupils are equal, round, and reactive to light.   Neck: Trachea normal and phonation normal. Neck supple.   Cardiovascular: Normal rate, regular rhythm and normal heart sounds.   Pulmonary/Chest: Effort normal and breath sounds normal. No " stridor. No respiratory distress.   Musculoskeletal: Normal range of motion.         General: Normal range of motion.   Neurological: She is alert and oriented to person, place, and time.   Skin: Skin is warm, dry, intact and no rash. Capillary refill takes less than 2 seconds. No abrasion, No burn, No bruising, No erythema and No ecchymosis   Psychiatric: Her speech is normal and behavior is normal. Judgment and thought content normal.   Nursing note and vitals reviewed.        Palpable 2x2cm callused lesion to bottom of right foot. Tender to touch. No drainage.   Assessment:     1. Foot pain, right    2. Skin abrasion        Plan:       Foot pain, right  -     X-Ray Foot Complete 3 view Right; Future; Expected date: 08/16/2024  -     Ambulatory referral/consult to Podiatry    Skin abrasion  -     mupirocin (BACTROBAN) 2 % ointment; Apply topically 3 (three) times daily. for 7 days  Dispense: 30 g; Refill: 0      X-Ray Foot Complete 3 view Right    Result Date: 8/16/2024  EXAMINATION: XR FOOT COMPLETE 3 VIEW RIGHT CLINICAL HISTORY: . Pain in right foot TECHNIQUE: AP, lateral, and oblique views of the right foot were performed. COMPARISON: None FINDINGS: DJD mainly affecting the tarsal metatarsal articulations.  Mild hallux valgus.  No acute fracture or dislocation.  No bone destruction identified.  Mild hammertoes     See above Electronically signed by: Finn Kern MD Date:    08/16/2024 Time:    13:33          Patient Instructions   Soak with warm epsom salt for 15-30 minutes 2 times a day. Apply mupirocen ointment after. Follow up with podiatry. Tylenol as needed for pain

## 2024-08-16 NOTE — PATIENT INSTRUCTIONS
Soak with warm epsom salt for 15-30 minutes 2 times a day. Apply mupirocen ointment after. Follow up with podiatry. Tylenol as needed for pain

## 2024-08-19 ENCOUNTER — OFFICE VISIT (OUTPATIENT)
Dept: PODIATRY | Facility: CLINIC | Age: 74
End: 2024-08-19
Payer: MEDICARE

## 2024-08-19 VITALS
WEIGHT: 171.94 LBS | SYSTOLIC BLOOD PRESSURE: 111 MMHG | HEIGHT: 64 IN | BODY MASS INDEX: 29.35 KG/M2 | HEART RATE: 74 BPM | DIASTOLIC BLOOD PRESSURE: 70 MMHG

## 2024-08-19 DIAGNOSIS — M79.5 FOREIGN BODY (FB) IN SOFT TISSUE: Primary | ICD-10-CM

## 2024-08-19 DIAGNOSIS — M79.671 RIGHT FOOT PAIN: ICD-10-CM

## 2024-08-19 PROCEDURE — 99213 OFFICE O/P EST LOW 20 MIN: CPT | Mod: PBBFAC | Performed by: PODIATRIST

## 2024-08-19 PROCEDURE — 99203 OFFICE O/P NEW LOW 30 MIN: CPT | Mod: S$PBB,,, | Performed by: PODIATRIST

## 2024-08-19 PROCEDURE — 99999 PR PBB SHADOW E&M-EST. PATIENT-LVL III: CPT | Mod: PBBFAC,,, | Performed by: PODIATRIST

## 2024-08-19 RX ORDER — SULFAMETHOXAZOLE AND TRIMETHOPRIM 400; 80 MG/1; MG/1
1 TABLET ORAL 2 TIMES DAILY
Qty: 20 TABLET | Refills: 0 | Status: SHIPPED | OUTPATIENT
Start: 2024-08-19

## 2024-08-19 NOTE — PROGRESS NOTES
[facility] Progress Note  8/20/2024          Reason for Visit:      had concerns including Atrial Fibrillation and Follow-up.       History of Present Illness:     Patient is here today for a follow-up.    Afib/Aflutter  Eliquis 5 mg bid  Flecainide 100 bid  Diltiazem  daily  Following with Dr. Li  She had slow ventircular response- episodes of bradycardia  She feels much better after getting the pacemaker and is not having any more of the presyncope/syncope.  Done June 9.  She is still going into afib occasionally, but brief, and can feel it when she does.  She doesn't like the flecainide, having some side effects she attribues to it.  When she gets excited (happy or anxious) she gets short of breath - lasts brief time then resolves.     Hypothyroidism  Levothyroxine 112  Normal Tsh and Free T4 in December 2023  She is agreable to repeat that today     Osteoporosis   Alendronate 70mg  Dexa done 12/2022 showed T score -2.5.  Started alendronate then.  Takes calcium and a vitamin D supplement.    No side effects  Plan repeat DEXA in 12/2024 - ordered today for that time period     CKD3a  Her kidney function has been mildly reduced in the stage 3a range.    Seeing Dr. Brock (nephrology)  Cyst on kidney on US  Stable on recent labs     HTN  Lisinopril 10 mg daily  Diltiazem   Blood pressure has been controlled on current meds  She is working on weight loss with 5016-0896 kcal/day diet          Aortic atherosclerosis  Crestor 10 mg  PT saw vascular surgery for venous insufficiency and they noted some plaque.  She is on crestor and tolerating.     HM  Reviewed HM  Encouraged covid/flu shots this fall at pharamcy      Problem List:     Patient Active Problem List   Diagnosis    Essential hypertension    Acquired hypothyroidism    Osteoporosis    History of Naga-en-Y gastric bypass    Mild obesity    Stage 3a chronic kidney disease    Vitamin D deficiency    Venous insufficiency     Atherosclerosis of aorta    Paroxysmal atrial fibrillation    Symptomatic bradycardia    Cardiac pacemaker in situ - LBBA lead    Age-related osteoporosis without current pathological fracture    Severe obesity (BMI 35.0-39.9) with comorbidity    On anticoagulant therapy    Stage 3b chronic kidney disease         Medications:       Current Outpatient Medications:     alendronate (FOSAMAX) 70 MG tablet, Take 1 tablet (70 mg total) by mouth every 7 days., Disp: 4 tablet, Rfl: 11    apixaban (ELIQUIS) 5 mg Tab, Take 1 tablet (5 mg total) by mouth 2 (two) times daily., Disp: 180 tablet, Rfl: 3    betamethasone dipropionate (DIPROLENE) 0.05 % ointment, USE TWICE DAILY ON RED ITCHY RASH ON HANDS, Disp: , Rfl:     calcium citrate-vitamin D3 315-200 mg (CITRACAL+D) 315 mg-5 mcg (200 unit) per tablet, Take 1 tablet by mouth 2 (two) times daily., Disp: , Rfl:     cyanocobalamin 1,000 mcg/mL injection, INJECT 1 ML (1,000 MCG TOTAL) INTO THE MUSCLE EVERY 30 DAYS., Disp: 3 mL, Rfl: 3    diltiaZEM (DILACOR XR) 120 MG CDCR, Take 1 capsule (120 mg total) by mouth once daily., Disp: 90 capsule, Rfl: 3    flecainide (TAMBOCOR) 100 MG Tab, Take 1 tablet (100 mg total) by mouth every 12 (twelve) hours., Disp: 180 tablet, Rfl: 3    fluoride, sodium, (PREVIDENT 5000) 1.1 % Pste, PLEASE SEE ATTACHED FOR DETAILED DIRECTIONS, Disp: , Rfl:     hydrOXYzine HCL (ATARAX) 25 MG tablet, Take 1 tablet (25 mg total) by mouth 3 (three) times daily as needed for Itching., Disp: 20 tablet, Rfl: 0    levothyroxine (SYNTHROID) 112 MCG tablet, TAKE 1 TABLET BY MOUTH EVERY DAY BEFORE BREAKFAST, Disp: 90 tablet, Rfl: 3    lisinopriL 10 MG tablet, Take 1 tablet (10 mg total) by mouth once daily., Disp: 90 tablet, Rfl: 3    multivitamin capsule, Take 1 capsule by mouth once daily., Disp: , Rfl:     mupirocin (BACTROBAN) 2 % ointment, Apply topically 3 (three) times daily. for 7 days, Disp: 30 g, Rfl: 0    rosuvastatin (CRESTOR) 20 MG tablet, Take 1 tablet  "(20 mg total) by mouth once daily., Disp: 90 tablet, Rfl: 3    sulfamethoxazole-trimethoprim 400-80mg (BACTRIM,SEPTRA) 400-80 mg per tablet, Take 1 tablet by mouth 2 (two) times daily., Disp: 20 tablet, Rfl: 0    triamcinolone acetonide 0.1% (KENALOG) 0.1 % cream, Apply topically 2 (two) times daily., Disp: 45 g, Rfl: 0    famotidine (PEPCID) 20 MG tablet, Take 1 tablet (20 mg total) by mouth 2 (two) times daily. (Patient taking differently: Take 20 mg by mouth daily as needed.), Disp: 60 tablet, Rfl: 3        Review of Systems:     Review of Systems   Constitutional:  Negative for chills and fever.   HENT:  Negative for ear pain and sore throat.    Respiratory:  Negative for cough, shortness of breath and wheezing.    Cardiovascular:  Negative for chest pain and palpitations.   Gastrointestinal:  Negative for constipation, diarrhea, nausea and vomiting.   Genitourinary:  Negative for dysuria and hematuria.   Musculoskeletal:  Negative for joint swelling and joint deformity.   Neurological:  Negative for dizziness and weakness.           Physical Exam:     Temp:             Blood Pressure:  102/64        Pulse:   94     Respirations:  14  Weight:   77.2 kg (170 lb 3.1 oz)  Height:   5' 4" (1.626 m)  BMI:     Body mass index is 29.21 kg/m².    Physical Exam  Constitutional:       General: She is not in acute distress.  HENT:      Right Ear: Tympanic membrane normal.      Left Ear: Tympanic membrane normal.      Nose: No congestion or rhinorrhea.      Mouth/Throat:      Pharynx: No oropharyngeal exudate or posterior oropharyngeal erythema.   Cardiovascular:      Rate and Rhythm: Normal rate and regular rhythm.   Pulmonary:      Effort: Pulmonary effort is normal. No respiratory distress.      Breath sounds: No wheezing.   Abdominal:      Palpations: Abdomen is soft.      Tenderness: There is no abdominal tenderness.   Skin:     General: Skin is warm.   Neurological:      General: No focal deficit present.      Mental " Status: She is alert and oriented to person, place, and time.           Labs/Imaging/Testing:     Most recent CBC:  Lab Results   Component Value Date    WBC 4.55 08/09/2024    HGB 12.0 08/09/2024     08/09/2024    MCV 91 08/09/2024       Most recent Lipids:  Lab Results   Component Value Date    CHOL 133 12/21/2023    HDL 57 12/21/2023    LDLCALC 65.4 12/21/2023    TRIG 53 12/21/2023       Most recent Chemistry:  Lab Results   Component Value Date     08/09/2024    K 4.9 08/09/2024     08/09/2024    CO2 22 (L) 08/09/2024    BUN 30 (H) 08/09/2024    CREATININE 1.3 08/09/2024    GLU 74 08/09/2024    CALCIUM 9.8 08/09/2024    ALT 19 06/24/2024    AST 22 06/24/2024    ALKPHOS 68 06/24/2024    PROT 7.3 06/24/2024    ALBUMIN 3.6 08/09/2024       Other tests:  Lab Results   Component Value Date    TSH 1.290 12/21/2023    FREET4 1.18 12/21/2023    INR 1.0 07/11/2023    HGBA1C 5.5 05/23/2023    FERRITIN 30 11/19/2021    VEMIMTFA77 1186 (H) 11/19/2021    [id number] 54 12/21/2023    MG 2.3 11/19/2021       Assessment and Plan:     1. Acquired hypothyroidism  - Hemoglobin A1C; Future  - TSH; Future  - T4, Free; Future    2. Chronic atrial fibrillation  Stable, seeing Dr. Li    3. Osteoporosis, unspecified osteoporosis type, unspecified pathological fracture presence  - DXA Bone Density Axial Skeleton 1 or more sites; Future    4. Stage 3a chronic kidney disease  Stable, following with Dr. Brock    5. Essential hypertension  Stable on current meds    6. Atherosclerosis of aorta  On crestor    7. IFG (impaired fasting glucose)  Has been pretty much resolved after weight loss  - Hemoglobin A1C; Future       RTC 6 mos or sooner mary Lebron MD  8/20/2024    This note was prepared using voice-recognition software.  Although efforts are made to proofread the note, some errors may persist in the final document.

## 2024-08-20 ENCOUNTER — OFFICE VISIT (OUTPATIENT)
Dept: PRIMARY CARE CLINIC | Facility: CLINIC | Age: 74
End: 2024-08-20
Payer: MEDICARE

## 2024-08-20 ENCOUNTER — LAB VISIT (OUTPATIENT)
Dept: LAB | Facility: HOSPITAL | Age: 74
End: 2024-08-20
Attending: INTERNAL MEDICINE
Payer: MEDICARE

## 2024-08-20 VITALS
SYSTOLIC BLOOD PRESSURE: 102 MMHG | DIASTOLIC BLOOD PRESSURE: 64 MMHG | BODY MASS INDEX: 29.06 KG/M2 | OXYGEN SATURATION: 100 % | WEIGHT: 170.19 LBS | RESPIRATION RATE: 14 BRPM | HEART RATE: 94 BPM | HEIGHT: 64 IN

## 2024-08-20 DIAGNOSIS — I70.0 ATHEROSCLEROSIS OF AORTA: ICD-10-CM

## 2024-08-20 DIAGNOSIS — E03.9 ACQUIRED HYPOTHYROIDISM: Primary | ICD-10-CM

## 2024-08-20 DIAGNOSIS — E03.9 ACQUIRED HYPOTHYROIDISM: ICD-10-CM

## 2024-08-20 DIAGNOSIS — N18.31 STAGE 3A CHRONIC KIDNEY DISEASE: ICD-10-CM

## 2024-08-20 DIAGNOSIS — I10 ESSENTIAL HYPERTENSION: ICD-10-CM

## 2024-08-20 DIAGNOSIS — R73.01 IFG (IMPAIRED FASTING GLUCOSE): ICD-10-CM

## 2024-08-20 DIAGNOSIS — M81.0 OSTEOPOROSIS, UNSPECIFIED OSTEOPOROSIS TYPE, UNSPECIFIED PATHOLOGICAL FRACTURE PRESENCE: ICD-10-CM

## 2024-08-20 DIAGNOSIS — I48.20 CHRONIC ATRIAL FIBRILLATION: ICD-10-CM

## 2024-08-20 LAB
ESTIMATED AVG GLUCOSE: 97 MG/DL (ref 68–131)
HBA1C MFR BLD: 5 % (ref 4–5.6)
T4 FREE SERPL-MCNC: 1.02 NG/DL (ref 0.71–1.51)
TSH SERPL DL<=0.005 MIU/L-ACNC: 2.25 UIU/ML (ref 0.4–4)

## 2024-08-20 PROCEDURE — 84439 ASSAY OF FREE THYROXINE: CPT | Performed by: INTERNAL MEDICINE

## 2024-08-20 PROCEDURE — 99215 OFFICE O/P EST HI 40 MIN: CPT | Mod: PBBFAC,PN | Performed by: INTERNAL MEDICINE

## 2024-08-20 PROCEDURE — 83036 HEMOGLOBIN GLYCOSYLATED A1C: CPT | Performed by: INTERNAL MEDICINE

## 2024-08-20 PROCEDURE — 99214 OFFICE O/P EST MOD 30 MIN: CPT | Mod: S$PBB,,, | Performed by: INTERNAL MEDICINE

## 2024-08-20 PROCEDURE — 99999 PR PBB SHADOW E&M-EST. PATIENT-LVL V: CPT | Mod: PBBFAC,,, | Performed by: INTERNAL MEDICINE

## 2024-08-20 PROCEDURE — 84443 ASSAY THYROID STIM HORMONE: CPT | Performed by: INTERNAL MEDICINE

## 2024-08-20 RX ORDER — SODIUM FLUORIDE 6 MG/ML
PASTE, DENTIFRICE DENTAL
COMMUNITY
Start: 2024-08-18

## 2024-08-21 NOTE — PROGRESS NOTES
Subjective:      Patient ID: Rosa Maria Vazquez is a 73 y.o. female.    Chief Complaint:   Follow-up and Foot Pain (Puncture ball of right foot )    Rosa Maria is a 73 y.o. female who presents to the podiatry clinic  with complaint of  right foot pain. Onset of the symptoms was several days ago. Precipitating event:  Stepped on foreign body . Current symptoms include: ability to bear weight, but with some pain. Aggravating factors: any weight bearing. Symptoms have stabilized. Patient has had no prior foot problems. Evaluation to date:  Urgent Care . Treatment to date:  Tetanus/antibiotics. Patients rates pain 5/10 on pain scale.    Review of Systems   Constitutional: Negative for chills, decreased appetite, fever and malaise/fatigue.   HENT:  Negative for congestion, hearing loss, nosebleeds and tinnitus.    Eyes:  Negative for double vision, pain, photophobia and visual disturbance.   Cardiovascular:  Negative for chest pain, claudication, cyanosis and leg swelling.   Respiratory:  Negative for cough, hemoptysis, shortness of breath and wheezing.    Endocrine: Negative for cold intolerance and heat intolerance.   Hematologic/Lymphatic: Negative for adenopathy and bleeding problem.   Skin:  Positive for suspicious lesions. Negative for color change, dry skin, itching and nail changes.   Musculoskeletal:  Positive for arthritis and joint pain. Negative for myalgias and stiffness.   Gastrointestinal:  Negative for abdominal pain, jaundice, nausea and vomiting.   Genitourinary:  Negative for dysuria, frequency and hematuria.   Neurological:  Negative for difficulty with concentration, loss of balance, numbness, paresthesias and sensory change.   Psychiatric/Behavioral:  Negative for altered mental status, hallucinations and suicidal ideas. The patient is not nervous/anxious.    Allergic/Immunologic: Negative for environmental allergies and persistent infections.           Objective:      Physical Exam  Vitals reviewed.    Constitutional:       Appearance: She is well-developed.   HENT:      Head: Normocephalic and atraumatic.   Cardiovascular:      Pulses:           Dorsalis pedis pulses are 2+ on the right side and 2+ on the left side.        Posterior tibial pulses are 2+ on the right side and 2+ on the left side.   Pulmonary:      Effort: Pulmonary effort is normal.   Musculoskeletal:         General: Normal range of motion.      Comments: Inspection and palpation of the muscles joints and bones of both lower extremities reveal that muscle strength for the anterior lateral and posterior muscle groups and intrinsic muscle groups of the foot are all 5 over 5 symmetrical.  Ankle subtalar midtarsal and digital joint range of motion are within normal limits, nonpainful, without crepitus or effusion.  Patient exhibits a normal angle and base of gait.  Palpation of the tendons reveal no defects.   Skin:     General: Skin is warm and dry.      Capillary Refill: Capillary refill takes 2 to 3 seconds.      Comments: Skin turgor is normal bilaterally.  Skin texture is well hydrated to both lower extremities.  No lesions or rashes or wounds appreciated bilaterally.      Plantar right foot foot noted to have small ulceration from puncture wound with surrounding erythema and tenderness.   Neurological:      Mental Status: She is alert and oriented to person, place, and time.      Comments: Sharp dull light touch vibratory proprioceptive sensation are intact bilaterally.  Deep tendon reflexes to patellar and Achilles tendon are symmetrical 2 over 4 bilaterally.  No ankle clonus or Babinski reflexes noted bilaterally.  Coordination is normal to both feet and lower extremities.   Psychiatric:         Behavior: Behavior normal.               Assessment:       Encounter Diagnoses   Name Primary?    Foreign body (FB) in soft tissue Yes    Right foot pain      Independent visualization of imaging was performed.  Results were reviewed in detail with  patient.       Plan:       Rosa Maria was seen today for follow-up and foot pain.    Diagnoses and all orders for this visit:    Foreign body (FB) in soft tissue  -     MRI Foot (Forefoot)Right With Contrast; Future  -     CT Foot Without Contrast Right; Future    Right foot pain  -     CT Foot Without Contrast Right; Future    Other orders  -     sulfamethoxazole-trimethoprim 400-80mg (BACTRIM,SEPTRA) 400-80 mg per tablet; Take 1 tablet by mouth 2 (two) times daily.      I counseled the patient on her conditions, their implications and medical management.    The nature of the condition, options for management, as well as potential risks and complications were discussed in detail with patient. Patient was amenable to my recommendations and left my office fully informed and will follow up as instructed or sooner if necessary.      Area debrided/no obvious foreign body noted.  Images reviewed.  CT ordered.  Patient unable to get MRI.  Follow-up after images are available.

## 2024-09-12 RX ORDER — LEVOTHYROXINE SODIUM 112 UG/1
112 TABLET ORAL
Qty: 90 TABLET | Refills: 3 | Status: SHIPPED | OUTPATIENT
Start: 2024-09-12

## 2024-09-12 NOTE — TELEPHONE ENCOUNTER
Care Due:                  Date            Visit Type   Department     Provider  --------------------------------------------------------------------------------                                EP -                              PRIMARY      [facility]  Last Visit: 08-      CARE ([facility])   Bria Lebron                              EP -                              PRIMARY      [facility]  Next Visit: 02-      CARE ([facility])   Bria Lebron                                                            Last  Test          Frequency    Reason                     Performed    Due Date  --------------------------------------------------------------------------------    Mg Level....  12 months..  alendronate..............  Not Found    Overdue    [employer] Embedded Care Due Messages. Reference number: [id number].   9/12/2024 12:06:09 AM CDT

## 2024-09-12 NOTE — TELEPHONE ENCOUNTER
Refill Decision Note   Rosa Maria George  is requesting a refill authorization.  Brief Assessment and Rationale for Refill:  Approve     Medication Therapy Plan:         Comments:     Note composed:7:36 AM 09/12/2024

## 2024-09-16 ENCOUNTER — CLINICAL SUPPORT (OUTPATIENT)
Dept: CARDIOLOGY | Facility: HOSPITAL | Age: 74
End: 2024-09-16
Attending: INTERNAL MEDICINE
Payer: MEDICARE

## 2024-09-16 ENCOUNTER — CLINICAL SUPPORT (OUTPATIENT)
Dept: CARDIOLOGY | Facility: HOSPITAL | Age: 74
End: 2024-09-16
Payer: MEDICARE

## 2024-09-16 DIAGNOSIS — R00.1 BRADYCARDIA, UNSPECIFIED: ICD-10-CM

## 2024-09-16 DIAGNOSIS — Z95.0 PRESENCE OF CARDIAC PACEMAKER: ICD-10-CM

## 2024-09-16 DIAGNOSIS — I44.0 ATRIOVENTRICULAR BLOCK, FIRST DEGREE: ICD-10-CM

## 2024-09-16 PROCEDURE — 93294 REM INTERROG EVL PM/LDLS PM: CPT | Mod: ,,, | Performed by: INTERNAL MEDICINE

## 2024-09-17 ENCOUNTER — TELEPHONE (OUTPATIENT)
Dept: CARDIOLOGY | Facility: HOSPITAL | Age: 74
End: 2024-09-17
Payer: MEDICARE

## 2024-09-17 NOTE — TELEPHONE ENCOUNTER
"Pt is post DCCV on 7/18/23.  Recurrence of paroxysmal atrial fibrillation is noted.  Ventricular rates appear controlled.  The patient is symptomatic. Reports "dizzy spells". States she can tell when she is having an episode of AF.     Notable meds: Eliquis 5mg BID, Diltiazem 120mg daily, Flecainide 100mg BID    Most events recorded occurring between 8/18/24-8/19/24.   AF burden 0.2% since 6/16/24. Some undersensing is noted, could be higher.     Max duration listed 1 hr 41 mins on 8/19/24.  Most recent episode for 3 mins 3 secs on 8/26/24.    Spoke with patient over the phone. Reports compliance with meds. Informed her that information would be sent to Dr. Chance for review and she would be notified of any changes or recommendations. In the event of no changes, we would continue to monitor via [facility]. She verbalized understanding and appreciated the call.    Message sent to MA staff to schedule overdue follow up appt. Pt was advised that someone from the office would be reaching out to her to schedule.                    "

## 2024-09-23 ENCOUNTER — TELEPHONE (OUTPATIENT)
Dept: ELECTROPHYSIOLOGY | Facility: CLINIC | Age: 74
End: 2024-09-23
Payer: MEDICARE

## 2024-09-23 NOTE — TELEPHONE ENCOUNTER
----- Message from Brianda Hardy sent at 9/23/2024  1:12 PM CDT -----  Type:  Patient Returning Call    Who Called:pt  Who Left Message for Patient:pt  Does the patient know what this is regarding?: pt was calling to see when her appt was going mara for her abnormal heartbeat  Would the patient rather a call back or a response via [facility]? CALL  Best Call Back Number:Click to [phone]  Additional Information: call back

## 2024-09-26 ENCOUNTER — TELEPHONE (OUTPATIENT)
Dept: ELECTROPHYSIOLOGY | Facility: CLINIC | Age: 74
End: 2024-09-26
Payer: MEDICARE

## 2024-09-26 NOTE — TELEPHONE ENCOUNTER
----- Message from Zenobia Castañeda MA sent at 9/26/2024 12:26 PM CDT -----  Regarding: RE: Appt  Please read below    Thanks  ----- Message -----  From: Joyce David  Sent: 9/26/2024  11:55 AM CDT  To: [facility] Staff  Subject: Appt                                             Patient having abnormal heart rhythm and waiting a call from staff. Please call back @ [phone]. Thank you Joyce

## 2024-09-26 NOTE — TELEPHONE ENCOUNTER
Spoke to patient who states still having dizzy spells, but nothing new from what she experienced when we spoke to her last week. Advised we do need to get her in for her f/u appts. Advised will have MA reach out to get her scheduled. She verbalized understanding and had no further questions.

## 2024-09-29 DIAGNOSIS — E53.8 VITAMIN B12 DEFICIENCY: ICD-10-CM

## 2024-09-29 RX ORDER — CYANOCOBALAMIN 1000 UG/ML
INJECTION, SOLUTION INTRAMUSCULAR; SUBCUTANEOUS
Qty: 1 ML | Refills: 11 | Status: SHIPPED | OUTPATIENT
Start: 2024-09-29

## 2024-09-30 ENCOUNTER — TELEPHONE (OUTPATIENT)
Dept: ELECTROPHYSIOLOGY | Facility: CLINIC | Age: 74
End: 2024-09-30
Payer: MEDICARE

## 2024-10-01 ENCOUNTER — TELEPHONE (OUTPATIENT)
Dept: ELECTROPHYSIOLOGY | Facility: CLINIC | Age: 74
End: 2024-10-01
Payer: MEDICARE

## 2024-10-02 LAB
OHS CV AF BURDEN PERCENT: < 1
OHS CV DC REMOTE DEVICE TYPE: NORMAL
OHS CV ICD SHOCK: NO
OHS CV RV PACING PERCENT: 98.53 %

## 2024-10-11 ENCOUNTER — OFFICE VISIT (OUTPATIENT)
Dept: CARDIOLOGY | Facility: CLINIC | Age: 74
End: 2024-10-11
Payer: MEDICARE

## 2024-10-11 VITALS
BODY MASS INDEX: 30.22 KG/M2 | HEART RATE: 66 BPM | SYSTOLIC BLOOD PRESSURE: 94 MMHG | DIASTOLIC BLOOD PRESSURE: 65 MMHG | HEIGHT: 64 IN | WEIGHT: 177 LBS

## 2024-10-11 DIAGNOSIS — Z98.84 HISTORY OF ROUX-EN-Y GASTRIC BYPASS: ICD-10-CM

## 2024-10-11 DIAGNOSIS — R00.1 SYMPTOMATIC BRADYCARDIA: ICD-10-CM

## 2024-10-11 DIAGNOSIS — I70.0 ATHEROSCLEROSIS OF AORTA: ICD-10-CM

## 2024-10-11 DIAGNOSIS — Z95.0 CARDIAC PACEMAKER IN SITU: ICD-10-CM

## 2024-10-11 DIAGNOSIS — I48.19 PERSISTENT ATRIAL FIBRILLATION: Primary | ICD-10-CM

## 2024-10-11 DIAGNOSIS — I10 ESSENTIAL HYPERTENSION: ICD-10-CM

## 2024-10-11 PROCEDURE — 99999 PR PBB SHADOW E&M-EST. PATIENT-LVL IV: CPT | Mod: PBBFAC,,, | Performed by: INTERNAL MEDICINE

## 2024-10-11 PROCEDURE — 99214 OFFICE O/P EST MOD 30 MIN: CPT | Mod: PBBFAC,PN | Performed by: INTERNAL MEDICINE

## 2024-10-11 NOTE — PROGRESS NOTES
Subjective:   Patient ID:  Rosa Maria Vazquez is a 73 y.o. female who presents for evaluation of Atrial Fibrillation and Pacemaker Check    Referring Cardiologist: Deedee Li MD  Primary Care Physician: Neftaly Lebron MD    HPI  Prior Hx:  I had the pleasure of seeing Mrs. Vazquez today in our electrophysiology clinic in follow-up for her atrial fibrillation. As you are aware she is a pleasant 73 year-old woman with hypertension, obesity s/p gastric bypass, hypothyroidism, aortic atherosclerotic disease, and persistent AF. She moved to the [place] area a few years ago to be near with family. She established care with Dr. Li. AF was incidentally observed on a recent ECG during follow-up (controlled to bradycardic rates). She endorsed intermittent light-headedness. A holter monitor was ordered which showed persistent AF with ventricular rates at times in the 30s-50s corresponding to symptoms. Average ventricular rate was 44 bpm. She is no on any AV nikole blocking agents. She is referred for consideration for PPM implantation for symptomatic bradycardia. She notes fatigue x 2 months and light-headedness with an episode of near syncope. She first saw me in 6/2023 and we discussed options. Elected for PPM then AAD therapy.    An echocardiogram from 2021 noted preserved LV function with mild LA enlargement with impaired relaxation.    I reviewed available ECGs in Epic which are only from May of 2023 which show AF with controlled ventricular rates. QRS is narrow.    6/19/2023: Successful implantation of PPM Dual with RV lead placed at the left bundle branch pacing area region.     7/18/2023: Cardioversion was successfully performed with restoration of normal sinus rhythm. Started on flecainide.      8/15/2023: She is now 2 mo s/p PPM implantation with LBBA pacing lead. She subsequently was cardioverted 1 mo ago and placed on flecainide 100mg BID. TIM EF 55%. She is feeling significant symptom improvement.      3/2024:  Seen by Mandy Ott. Was doing well.    Interim Hx:  Mrs. Vazquez returns for follow-up. Feels well overall but occasional has increased symptomatic PVCs. Also noticing at times when she gets emotional or laughs she gets very short of breath.     My interpretation of today's in-clinic PPM check is stable lead parameters with 98% AV pacing and 9.7 years of estimated battery longevity. 0.1% AF burden.    My interpretation of today's in-clinic ECG is AV paced with LVAT of 70ms.         Review of Systems   Constitutional: Negative for fever and malaise/fatigue.   HENT:  Negative for congestion and sore throat.    Eyes:  Negative for blurred vision and visual disturbance.   Cardiovascular:  Negative for chest pain, dyspnea on exertion, irregular heartbeat, near-syncope, palpitations and syncope.   Respiratory:  Negative for cough and shortness of breath.    Hematologic/Lymphatic: Negative for bleeding problem. Does not bruise/bleed easily.   Skin: Negative.    Musculoskeletal: Negative.    Gastrointestinal:  Negative for bloating, abdominal pain, hematochezia and melena.   Neurological:  Positive for light-headedness. Negative for focal weakness and weakness.   Psychiatric/Behavioral: Negative.         Objective:   Physical Exam  Vitals reviewed.   Constitutional:       General: She is not in acute distress.     Appearance: She is well-developed. She is not diaphoretic.   HENT:      Head: Normocephalic and atraumatic.   Eyes:      General:         Right eye: No discharge.         Left eye: No discharge.      Conjunctiva/sclera: Conjunctivae normal.   Cardiovascular:      Rate and Rhythm: Normal rate and regular rhythm.      Heart sounds: No murmur heard.     No friction rub. No gallop.   Pulmonary:      Effort: Pulmonary effort is normal. No respiratory distress.      Breath sounds: Normal breath sounds. No wheezing or rales.   Abdominal:      General: Bowel sounds are normal. There is no distension.       Palpations: Abdomen is soft.      Tenderness: There is no abdominal tenderness.   Musculoskeletal:      Cervical back: Neck supple.   Skin:     General: Skin is warm and dry.   Neurological:      Mental Status: She is alert and oriented to person, place, and time.   Psychiatric:         Behavior: Behavior normal.         Thought Content: Thought content normal.         Judgment: Judgment normal.         Assessment:      1. Persistent atrial fibrillation    2. Symptomatic bradycardia    3. Essential hypertension    4. Cardiac pacemaker in situ - LBBA lead    5. Atherosclerosis of aorta    6. History of Naga-en-Y gastric bypass        Plan:     In summary, Mrs. Vazquez is a pleasant 73 year-old woman with hypertension, obesity s/p gastric bypass, hypothyroidism, aortic atherosclerotic disease, and persistent AF with symptomatic bradycardia/slow ventricular response now s/p PPM implantation (LBBPA lead) and DCCV with rhythm control (on flecainide). Very rare AF on flecainide. Will update echo.    Thank you for allowing me to participate in the care of this patient. Please do not hesitate to call me with any questions or concerns.    Zeke Chance MD, PhD  Cardiac Electrophysiology

## 2024-10-16 ENCOUNTER — TELEPHONE (OUTPATIENT)
Dept: ELECTROPHYSIOLOGY | Facility: CLINIC | Age: 74
End: 2024-10-16
Payer: MEDICARE

## 2024-10-16 ENCOUNTER — HOSPITAL ENCOUNTER (OUTPATIENT)
Dept: CARDIOLOGY | Facility: HOSPITAL | Age: 74
Discharge: HOME | End: 2024-10-16
Attending: INTERNAL MEDICINE
Payer: MEDICARE

## 2024-10-16 VITALS
DIASTOLIC BLOOD PRESSURE: 88 MMHG | WEIGHT: 177 LBS | HEIGHT: 64 IN | SYSTOLIC BLOOD PRESSURE: 125 MMHG | BODY MASS INDEX: 30.22 KG/M2 | HEART RATE: 70 BPM

## 2024-10-16 DIAGNOSIS — I48.19 PERSISTENT ATRIAL FIBRILLATION: ICD-10-CM

## 2024-10-16 LAB
ASCENDING AORTA: 3.23 CM
AV AREA BY CONTINUOUS VTI: 2.3 CM2
AV INDEX (PROSTH): 0.68
AV LVOT MEAN GRADIENT: 3 MMHG
AV LVOT PEAK GRADIENT: 6 MMHG
AV MEAN GRADIENT: 7.3 MMHG
AV PEAK GRADIENT: 13 MMHG
AV REGURGITATION PRESSURE HALF TIME: 661.07 MS
AV VALVE AREA BY VELOCITY RATIO: 2.3 CM²
AV VALVE AREA: 2.4 CM2
AV VELOCITY RATIO: 0.67
BSA FOR ECHO PROCEDURE: 1.9 M2
CV ECHO LV RWT: 0.33 CM
DOP CALC AO PEAK VEL: 1.8 M/S
DOP CALC AO VTI: 48.6 CM
DOP CALC LVOT AREA: 3.5 CM2
DOP CALC LVOT DIAMETER: 2.1 CM
DOP CALC LVOT PEAK VEL: 1.2 M/S
DOP CALC LVOT STROKE VOLUME: 114.9 CM3
DOP CALCLVOT PEAK VEL VTI: 33.2 CM
E WAVE DECELERATION TIME: 130.55 MS
E/A RATIO: 1.93
E/E' RATIO: 8.9 M/S
ECHO EF ESTIMATED: 64 %
ECHO LV POSTERIOR WALL: 0.7 CM (ref 0.6–1.1)
FRACTIONAL SHORTENING: 34.9 % (ref 28–44)
INTERVENTRICULAR SEPTUM: 0.8 CM (ref 0.6–1.1)
IVC DIAMETER: 2.4 CM
IVRT: 114.18 MS
LA MAJOR: 6 CM
LA MINOR: 6.01 CM
LA WIDTH: 4.98 CM
LEFT ATRIUM SIZE: 4.24 CM
LEFT ATRIUM VOLUME INDEX MOD: 58.9 ML/M2
LEFT ATRIUM VOLUME INDEX: 57.9 ML/M2
LEFT ATRIUM VOLUME MOD: 109.59 ML
LEFT ATRIUM VOLUME: 107.78 CM3
LEFT INTERNAL DIMENSION IN SYSTOLE: 2.8 CM (ref 2.1–4)
LEFT VENTRICLE DIASTOLIC VOLUME INDEX: 45.73 ML/M2
LEFT VENTRICLE DIASTOLIC VOLUME: 85.05 ML
LEFT VENTRICLE MASS INDEX: 52 G/M2
LEFT VENTRICLE SYSTOLIC VOLUME INDEX: 16.3 ML/M2
LEFT VENTRICLE SYSTOLIC VOLUME: 30.33 ML
LEFT VENTRICULAR INTERNAL DIMENSION IN DIASTOLE: 4.3 CM (ref 3.5–6)
LEFT VENTRICULAR MASS: 96.8 G
LV LATERAL E/E' RATIO: 8.9
LV SEPTAL E/E' RATIO: 8.9
MV A" WAVE DURATION": 251.19 MS
MV PEAK A VEL: 0.46 M/S
MV PEAK E VEL: 0.89 M/S
OHS CV RV/LV RATIO: 0.95 CM
PISA TR MAX VEL: 2.38 M/S
PULM VEIN A" WAVE DURATION": 251.19 MS
PULM VEIN S/D RATIO: 1.11
PULMONIC VEIN PEAK A VELOCITY: 0.2 M/S
PV PEAK D VEL: 0.27 M/S
PV PEAK S VEL: 0.3 M/S
RA MAJOR: 4.59 CM
RA PRESSURE ESTIMATED: 8 MMHG
RA WIDTH: 3.81 CM
RIGHT ATRIAL AREA: 15.8 CM2
RIGHT VENTRICLE DIASTOLIC BASEL DIMENSION: 4.1 CM
RV TB RVSP: 10 MMHG
RV TISSUE DOPPLER FREE WALL SYSTOLIC VELOCITY 1 (APICAL 4 CHAMBER VIEW): 11.34 CM/S
SINUS: 2.87 CM
STJ: 2.42 CM
TDI LATERAL: 0.1 M/S
TDI SEPTAL: 0.1 M/S
TDI: 0.1 M/S
TR MAX PG: 23 MMHG
TRICUSPID ANNULAR PLANE SYSTOLIC EXCURSION: 3.11 CM
TV PEAK GRADIENT: 23 MMHG
TV REST PULMONARY ARTERY PRESSURE: 31 MMHG
Z-SCORE OF LEFT VENTRICULAR DIMENSION IN END DIASTOLE: -1.85
Z-SCORE OF LEFT VENTRICULAR DIMENSION IN END SYSTOLE: -1.04

## 2024-10-16 PROCEDURE — 93306 TTE W/DOPPLER COMPLETE: CPT | Mod: 26,,, | Performed by: INTERNAL MEDICINE

## 2024-10-16 PROCEDURE — 93306 TTE W/DOPPLER COMPLETE: CPT

## 2024-10-16 NOTE — TELEPHONE ENCOUNTER
----- Message from Zeke Chance MD sent at 10/16/2024 11:30 AM CDT -----  Please let Mrs Crane know her ultrasound showed a normal heart function

## 2024-10-22 RX ORDER — ALENDRONATE SODIUM 70 MG/1
70 TABLET ORAL
Qty: 4 TABLET | Refills: 11 | Status: SHIPPED | OUTPATIENT
Start: 2024-10-22 | End: 2025-10-22

## 2024-10-22 NOTE — TELEPHONE ENCOUNTER
No care due was identified.  [facility] Embedded Care Due Messages. Reference number: [id number].   10/22/2024 12:08:46 AM CDT

## 2024-10-29 DIAGNOSIS — Z00.00 ENCOUNTER FOR MEDICARE ANNUAL WELLNESS EXAM: ICD-10-CM

## 2024-11-04 ENCOUNTER — OFFICE VISIT (OUTPATIENT)
Dept: URGENT CARE | Facility: CLINIC | Age: 74
End: 2024-11-04
Payer: MEDICARE

## 2024-11-04 VITALS
RESPIRATION RATE: 16 BRPM | WEIGHT: 177 LBS | DIASTOLIC BLOOD PRESSURE: 79 MMHG | BODY MASS INDEX: 30.22 KG/M2 | HEIGHT: 64 IN | TEMPERATURE: 98 F | HEART RATE: 62 BPM | OXYGEN SATURATION: 96 % | SYSTOLIC BLOOD PRESSURE: 116 MMHG

## 2024-11-04 DIAGNOSIS — H65.193 ACUTE EFFUSION OF BOTH MIDDLE EARS: ICD-10-CM

## 2024-11-04 DIAGNOSIS — R42 DIZZINESS: Primary | ICD-10-CM

## 2024-11-04 PROCEDURE — 99213 OFFICE O/P EST LOW 20 MIN: CPT | Mod: S$GLB,,, | Performed by: PHYSICIAN ASSISTANT

## 2024-11-04 RX ORDER — CETIRIZINE HYDROCHLORIDE, PSEUDOEPHEDRINE HYDROCHLORIDE 5; 120 MG/1; MG/1
1 TABLET, FILM COATED, EXTENDED RELEASE ORAL DAILY
Qty: 10 TABLET | Refills: 0 | Status: SHIPPED | OUTPATIENT
Start: 2024-11-04 | End: 2024-11-14

## 2024-11-04 NOTE — PROGRESS NOTES
"Subjective:      Patient ID: Rosa Maria Vazquez is a 73 y.o. female.    Vitals:  height is 5' 4" (1.626 m) and weight is 80.3 kg (177 lb). Her oral temperature is 98 °F (36.7 °C). Her blood pressure is 133/85 and her pulse is 74. Her respiration is 16 and oxygen saturation is 96%.     Chief Complaint: Dizziness    This is a 73 y.o. female who presents today with a chief complaint of last night having dizzy spells. Off-balance, runny nose patient states the runny nose is not new has been going on for over a month.  Dizziness only occurs when she gets up really quickly.  Patient states the dizzy spells lasts for less than a second she has only had 1 episode of nausea which has subsided.  She does admit to ringing in her left ear and hearing loss muffled in the left ear.  No fevers, chills, headache, ear pain weakness, sore throat, cough, shortness a breath, chest pain, abdominal pain, vomiting, diarrhea, body aches, rashes, slurred speech, confusion, vision changes.      Dizziness:   Chronicity:  New  Onset:  Yesterday  Progression since onset:  Unchanged  Severity:  Mild  Dizziness characteristics:  Off-balance  Initial Spell Date and Length:  1 second  Frequency of Spells:  Daily  Duration of Spells:  1 second   Associated symptoms: hearing loss, ear congestion, tinnitus, nausea and aural fullness.no ear pain, no fever, no headaches, no vomiting, no diaphoresis, no weakness, no visual disturbances, no light-headedness, no syncope, no palpitations, no panic, no facial weakness, no slurred speech, no numbness in extremities and no chest pain.  Aggravated by:  Getting up  Treatments tried:  Nothing   PMH includes: cardiac surgery.no strokes, no neurologic disease, no head trauma, no ear trauma, no ear surgery, no head trauma, no ear infections, no anxiety, no ear tubes and no environmental allergies.      Constitution: Negative for appetite change, chills, sweating, fatigue, fever and generalized weakness.   HENT:  Positive " for tinnitus, hearing loss and postnasal drip. Negative for ear pain, ear discharge, dental problem, mouth sores, tongue pain, tongue lesion, facial swelling, congestion, sinus pain, sinus pressure, sore throat, trouble swallowing and voice change.    Neck: Negative for neck pain and neck stiffness.   Cardiovascular:  Negative for chest pain, palpitations and passing out.   Eyes:  Negative for eye pain, eye redness, photophobia, vision loss, double vision and blurred vision.   Respiratory:  Negative for cough and shortness of breath.    Gastrointestinal:  Positive for nausea. Negative for abdominal pain, vomiting, constipation and diarrhea.   Musculoskeletal:  Negative for muscle ache.   Skin:  Negative for rash.   Allergic/Immunologic: Negative for environmental allergies.   Neurological:  Positive for dizziness. Negative for history of vertigo, light-headedness, passing out, facial drooping, speech difficulty, coordination disturbances, headaches, history of migraines, disorientation, altered mental status, loss of consciousness, numbness, tingling and tremors.   Psychiatric/Behavioral:  Negative for altered mental status and disorientation.       Past Medical History:   Diagnosis Date    Anticoagulant long-term use     Atrial fibrillation     Hypertension     Thyroid disease        Past Surgical History:   Procedure Laterality Date    A-V CARDIAC PACEMAKER INSERTION N/A 6/19/2023    Procedure: INSERTION, CARDIAC PACEMAKER, DUAL CHAMBER;  Surgeon: Zeke Chance MD;  Location: [facility];  Service: Cardiology;  Laterality: N/A;  SB/AF, Dual PPM with LBAP lead, MDT, MAC, DC, 3 Prep    ECHOCARDIOGRAM,TRANSESOPHAGEAL N/A 7/18/2023    Procedure: Transesophageal echo (TIM) intra-procedure log documentation;  Surgeon: Randy Allan MD;  Location: [facility];  Service: Cardiology;  Laterality: N/A;    EYE SURGERY      GASTRIC BYPASS      TREATMENT OF CARDIAC ARRHYTHMIA N/A 7/18/2023    Procedure: Cardioversion or  Defibrillation;  Surgeon: Zeke Chance MD;  Location: [facility];  Service: Cardiology;  Laterality: N/A;  AF, TIM, DCCV, MAC, AR, 3 Prep** MDT PPM in situ**       Family History   Problem Relation Name Age of Onset    Heart disease Mother      Heart disease Father      Diabetes Sister      Protein C deficiency Sister      Protein S deficiency Sister      Rectal cancer Sister         Social History     Socioeconomic History    Marital status:    Tobacco Use    Smoking status: Former     Current packs/day: 0.00     Average packs/day: 1 pack/day for 10.0 years (10.0 ttl pk-yrs)     Types: Cigarettes     Start date: 10/8/1970     Quit date: 10/21/1980     Years since quittin.0     Passive exposure: Never    Smokeless tobacco: Never   Substance and Sexual Activity    Alcohol use: Never    Drug use: Never    Sexual activity: Not Currently     Partners: Male     Comment:  (no interest)     Social Drivers of Health     Financial Resource Strain: Low Risk  (10/10/2024)    Overall Financial Resource Strain (CARDIA)     Difficulty of Paying Living Expenses: Not very hard   Food Insecurity: No Food Insecurity (10/10/2024)    Hunger Vital Sign     Worried About Running Out of Food in the Last Year: Never true     Ran Out of Food in the Last Year: Never true   Transportation Needs: No Transportation Needs (2023)    PRAPARE - Transportation     Lack of Transportation (Medical): No     Lack of Transportation (Non-Medical): No   Physical Activity: Insufficiently Active (10/10/2024)    Exercise Vital Sign     Days of Exercise per Week: 3 days     Minutes of Exercise per Session: 30 min   Stress: Stress Concern Present (10/10/2024)    [employer] - Occupational Stress Questionnaire     Feeling of Stress : To some extent   Housing Stability: Low Risk  (2023)    Housing Stability Vital Sign     Unable to Pay for Housing in the Last Year: No     Number of Places Lived in the  Last Year: 1     Unstable Housing in the Last Year: No       Current Outpatient Medications   Medication Sig Dispense Refill    alendronate (FOSAMAX) 70 MG tablet TAKE 1 TABLET (70 MG TOTAL) BY MOUTH EVERY 7 DAYS 4 tablet 11    apixaban (ELIQUIS) 5 mg Tab Take 1 tablet (5 mg total) by mouth 2 (two) times daily. 180 tablet 3    betamethasone dipropionate (DIPROLENE) 0.05 % ointment USE TWICE DAILY ON RED ITCHY RASH ON HANDS      calcium citrate-vitamin D3 315-200 mg (CITRACAL+D) 315 mg-5 mcg (200 unit) per tablet Take 1 tablet by mouth 2 (two) times daily.      cyanocobalamin 1,000 mcg/mL injection INJECT 1 ML (1,000 MCG) INTRAMUSCULARLY EVERY 30 DAYS 1 mL 11    diltiaZEM (DILACOR XR) 120 MG CDCR Take 1 capsule (120 mg total) by mouth once daily. 90 capsule 3    flecainide (TAMBOCOR) 100 MG Tab Take 1 tablet (100 mg total) by mouth every 12 (twelve) hours. 180 tablet 3    fluoride, sodium, (PREVIDENT 5000) 1.1 % Pste PLEASE SEE ATTACHED FOR DETAILED DIRECTIONS      hydrOXYzine HCL (ATARAX) 25 MG tablet Take 1 tablet (25 mg total) by mouth 3 (three) times daily as needed for Itching. 20 tablet 0    levothyroxine (SYNTHROID) 112 MCG tablet TAKE 1 TABLET BY MOUTH EVERY DAY BEFORE BREAKFAST 90 tablet 3    lisinopriL 10 MG tablet Take 1 tablet (10 mg total) by mouth once daily. 90 tablet 3    multivitamin capsule Take 1 capsule by mouth once daily.      rosuvastatin (CRESTOR) 20 MG tablet Take 1 tablet (20 mg total) by mouth once daily. 90 tablet 3    triamcinolone acetonide 0.1% (KENALOG) 0.1 % cream Apply topically 2 (two) times daily. 45 g 0    famotidine (PEPCID) 20 MG tablet Take 1 tablet (20 mg total) by mouth 2 (two) times daily. (Patient taking differently: Take 20 mg by mouth daily as needed.) 60 tablet 3     No current facility-administered medications for this visit.       Review of patient's allergies indicates:   Allergen Reactions    Lopressor [metoprolol tartrate] Anaphylaxis    Nickel Dermatitis and  Itching    Ondansetron Other (See Comments)     Double vision         Objective:     Physical Exam   Constitutional: She is oriented to person, place, and time. She appears well-developed. She is cooperative.  Non-toxic appearance. She does not appear ill. No distress.   HENT:   Head: Normocephalic and atraumatic.   Ears:   Right Ear: Hearing, external ear and ear canal normal. Tympanic membrane is not injected, not erythematous, not retracted and not bulging. A middle ear effusion is present. no impacted cerumen  Left Ear: Hearing, external ear and ear canal normal. Tympanic membrane is not injected, not erythematous, not retracted and not bulging. A middle ear effusion is present. no impacted cerumen  Nose: Rhinorrhea present. No mucosal edema, nasal deformity or congestion. No epistaxis. Right sinus exhibits no maxillary sinus tenderness and no frontal sinus tenderness. Left sinus exhibits no maxillary sinus tenderness and no frontal sinus tenderness.   Mouth/Throat: Uvula is midline, oropharynx is clear and moist and mucous membranes are normal. Mucous membranes are moist. No trismus in the jaw. Normal dentition. No uvula swelling. No oropharyngeal exudate, posterior oropharyngeal edema or posterior oropharyngeal erythema.   Nissa-Hallpike negative      Comments: Roll-Hallpike negative  Eyes: Right eye visual fields normal and left eye visual fields normal. Conjunctivae and lids are normal. Pupils are equal, round, and reactive to light. No visual field deficit is present. Right eye exhibits no discharge and no hordeolum. No foreign body present in the right eye. Left eye exhibits no discharge and no hordeolum. No foreign body present in the left eye. No scleral icterus. Right pupil is round, reactive and not sluggish. Left pupil is round, reactive and not sluggish. Pupils are equal.   Fundoscopic exam:       The right eye shows no arteriolar narrowing, no AV nicking, no exudate, no hemorrhage and no papilledema.  The right eye shows red reflex present and venous pulsations present.        The left eye shows no arteriolar narrowing, no AV nicking, no exudate, no hemorrhage and no papilledema. The left eye shows red reflex present and venous pulsations present. Extraocular movement intact vision grossly intact gaze aligned appropriately periorbital hyperpigmentation  Neck: Trachea normal and phonation normal. Neck supple. Carotid bruit is not present. No edema present. No erythema present. No neck rigidity present.   Cardiovascular: Normal rate, regular rhythm, normal heart sounds and normal pulses.   No murmur heard.Exam reveals no gallop and no friction rub.   Pulmonary/Chest: Effort normal and breath sounds normal. No stridor. No respiratory distress. She has no decreased breath sounds. She has no wheezes. She has no rhonchi. She has no rales.   Abdominal: Normal appearance.   Musculoskeletal: Normal range of motion.         General: No deformity. Normal range of motion.      Cervical back: She exhibits no tenderness.   Lymphadenopathy:     She has no cervical adenopathy.   Neurological: no focal deficit. She is alert and oriented to person, place, and time. She has normal motor skills and intact cranial nerves (2-12). She displays no weakness, facial symmetry and no dysarthria. No cranial nerve deficit or sensory deficit. She exhibits normal muscle tone. She has a normal Finger-Nose-Finger Test. Coordination: Romberg sign negative and Heel to shin test normal. Gait and coordination normal. Coordination and gait normal. GCS eye subscore is 4. GCS verbal subscore is 5. GCS motor subscore is 6.   Skin: Skin is warm, dry, intact, not diaphoretic, not pale and no rash.   Psychiatric: Her speech is normal and behavior is normal. Mood, judgment and thought content normal.   Nursing note and vitals reviewed.      Assessment:     1. Dizziness    2. Acute effusion of both middle ears        Plan:       Dizziness  -     Orthostatic  vital signs    Acute effusion of both middle ears  -     cetirizine-pseudoephedrine 5-120 mg Tb12; Take 1 tablet by mouth once daily. for 10 days  Dispense: 10 tablet; Refill: 0           Results reviewed   I have reviewed the patient chart and pertinent past imaging/labs.    Discussed with patient that we could do an EKG however after discussing with patient I believe the fluid behind her ears is most likely causing the dizzy sensation we jointly decided to hold off at this time.  She saw Cardiology 2 weeks ago had echo and EKG which were normal  Orthostatic vital signs reviewed and within normal limits    Patient Instructions   As we discussed I believe the fluid in her ear is causing her dizziness.  Take Zyrtec D 12 hour in the morning this can temporarily make your blood pressure go up and give you energy levels which can lead to insomnia.  Take with food.  If your symptoms worsen please go to the ER for evaluation

## 2024-11-04 NOTE — PATIENT INSTRUCTIONS
As we discussed I believe the fluid in her ear is causing her dizziness.  Take Zyrtec D 12 hour in the morning this can temporarily make your blood pressure go up and give you energy levels which can lead to insomnia.  Take with food.  If your symptoms worsen please go to the ER for evaluation

## 2024-12-16 ENCOUNTER — CLINICAL SUPPORT (OUTPATIENT)
Dept: CARDIOLOGY | Facility: HOSPITAL | Age: 74
End: 2024-12-16
Attending: INTERNAL MEDICINE
Payer: MEDICARE

## 2024-12-16 ENCOUNTER — CLINICAL SUPPORT (OUTPATIENT)
Dept: CARDIOLOGY | Facility: HOSPITAL | Age: 74
End: 2024-12-16
Payer: MEDICARE

## 2024-12-16 DIAGNOSIS — Z95.0 PRESENCE OF CARDIAC PACEMAKER: ICD-10-CM

## 2024-12-16 DIAGNOSIS — R00.1 BRADYCARDIA, UNSPECIFIED: ICD-10-CM

## 2024-12-16 DIAGNOSIS — I44.0 ATRIOVENTRICULAR BLOCK, FIRST DEGREE: ICD-10-CM

## 2024-12-16 PROCEDURE — 93294 REM INTERROG EVL PM/LDLS PM: CPT | Mod: ,,, | Performed by: INTERNAL MEDICINE

## 2025-01-08 LAB
OHS CV AF BURDEN PERCENT: < 1
OHS CV DC REMOTE DEVICE TYPE: NORMAL
OHS CV ICD SHOCK: NO
OHS CV RV PACING PERCENT: 98.74 %

## 2025-01-18 ENCOUNTER — OFFICE VISIT (OUTPATIENT)
Dept: URGENT CARE | Facility: CLINIC | Age: 75
End: 2025-01-18
Payer: MEDICARE

## 2025-01-18 VITALS
OXYGEN SATURATION: 95 % | DIASTOLIC BLOOD PRESSURE: 62 MMHG | WEIGHT: 177 LBS | TEMPERATURE: 98 F | RESPIRATION RATE: 16 BRPM | SYSTOLIC BLOOD PRESSURE: 101 MMHG | HEIGHT: 64 IN | HEART RATE: 84 BPM | BODY MASS INDEX: 30.22 KG/M2

## 2025-01-18 DIAGNOSIS — H10.13 ALLERGIC CONJUNCTIVITIS OF BOTH EYES: Primary | ICD-10-CM

## 2025-01-18 PROCEDURE — 99213 OFFICE O/P EST LOW 20 MIN: CPT | Mod: S$GLB,,, | Performed by: FAMILY MEDICINE

## 2025-01-18 RX ORDER — PREDNISOLONE ACETATE 10 MG/ML
1 SUSPENSION/ DROPS OPHTHALMIC 2 TIMES DAILY
Qty: 5 ML | Refills: 0 | Status: SHIPPED | OUTPATIENT
Start: 2025-01-18 | End: 2025-01-28

## 2025-01-18 NOTE — PROGRESS NOTES
"Subjective:      Patient ID: Rosa Maria Vazquez is a 74 y.o. female.    Vitals:  height is 5' 4" (1.626 m) and weight is 80.3 kg (177 lb). Her oral temperature is 97.5 °F (36.4 °C). Her blood pressure is 101/62 and her pulse is 84. Her respiration is 16 and oxygen saturation is 95%.     Chief Complaint: Eye Problem    This is a 74 y.o. female who presents today with a chief complaint of R eye discharge that started yesterday. Pt stated that her tear duck is swelling and has redness. Did do warm compresses       Eye Problem   The right eye is affected. This is a new problem. The current episode started yesterday. The problem occurs constantly. The problem has been unchanged. There was no injury mechanism. The pain is at a severity of 7/10. The pain is mild. There is No known exposure to pink eye. She Does not wear contacts. Associated symptoms include an eye discharge and eye redness.     Eyes:  Positive for eye discharge and eye redness.      Objective:     Physical Exam   Constitutional: She is oriented to person, place, and time. She appears well-developed. She is cooperative.  Non-toxic appearance. She does not appear ill. No distress.   HENT:   Head: Normocephalic and atraumatic.   Ears:   Right Ear: Hearing, tympanic membrane, external ear and ear canal normal.   Left Ear: Hearing, tympanic membrane, external ear and ear canal normal.   Nose: Nose normal. No mucosal edema, rhinorrhea or nasal deformity. No epistaxis. Right sinus exhibits no maxillary sinus tenderness and no frontal sinus tenderness. Left sinus exhibits no maxillary sinus tenderness and no frontal sinus tenderness.   Mouth/Throat: Uvula is midline, oropharynx is clear and moist and mucous membranes are normal. No trismus in the jaw. Normal dentition. No uvula swelling. No oropharyngeal exudate, posterior oropharyngeal edema or posterior oropharyngeal erythema.   Eyes: Conjunctivae and lids are normal. Right eye exhibits chemosis and discharge. Left " eye exhibits chemosis. Right conjunctiva is not injected. Left conjunctiva is not injected. No scleral icterus.   Neck: Trachea normal and phonation normal. Neck supple. No edema present. No erythema present. No neck rigidity present.   Cardiovascular: Normal rate, regular rhythm, normal heart sounds and normal pulses.   Pulmonary/Chest: Effort normal and breath sounds normal. No respiratory distress. She has no decreased breath sounds. She has no rhonchi.   Abdominal: Normal appearance.   Musculoskeletal: Normal range of motion.         General: No deformity. Normal range of motion.   Neurological: She is alert and oriented to person, place, and time. She exhibits normal muscle tone. Coordination normal.   Skin: Skin is warm, dry, intact, not diaphoretic and not pale.   Psychiatric: Her speech is normal and behavior is normal. Judgment and thought content normal.   Nursing note and vitals reviewed.      Assessment:     1. Allergic conjunctivitis of both eyes        Plan:       Allergic conjunctivitis of both eyes  -     prednisoLONE acetate (PRED FORTE) 1 % DrpS; Place 1 drop into the right eye 2 (two) times daily. for 10 days  Dispense: 5 mL; Refill: 0        Thank you for choosing [facility]!     Our goal in the Urgent Care is to always provide outstanding medical care. You may receive a survey by mail or e-mail in the next week regarding your experience today. We would greatly appreciate you completing and returning the survey. Your feedback provides us with a way to recognize our staff who provide very good care, and it helps us learn how to improve when your experience was below our aspiration of excellence.       We appreciate you trusting us with your medical care. We hope you feel better soon. We will be happy to take care of you for all of your future medical needs.  You must understand that you've received an Urgent Care treatment only and that you may be released before all your medical problems  are known or treated. You, the patient, will arrange for follow up care as instructed.  Follow up with your PCP or specialty clinic as directed in the next 1-2 weeks if not improved or as needed.  You can call [phone] to schedule an appointment with the appropriate provider.  Another option is to follow up with [facility] ([email or web address] virtually for quick simple medical advice.  If your condition worsens we recommend that you receive another evaluation at the emergency room immediately or contact your primary medical clinics after hours call service to discuss your concerns.  Please return here or go to the Emergency Department for any concerns or worsening of condition.      *If you were prescribed a narcotic or controlled medication, do not drive or operate heavy equipment or machinery while taking these medications.

## 2025-02-06 DIAGNOSIS — I70.0 ATHEROSCLEROSIS OF AORTA: ICD-10-CM

## 2025-02-06 RX ORDER — ROSUVASTATIN CALCIUM 20 MG/1
20 TABLET, COATED ORAL
Qty: 30 TABLET | Refills: 11 | Status: SHIPPED | OUTPATIENT
Start: 2025-02-06

## 2025-02-12 ENCOUNTER — TELEPHONE (OUTPATIENT)
Dept: NEPHROLOGY | Facility: CLINIC | Age: 75
End: 2025-02-12
Payer: MEDICARE

## 2025-02-12 DIAGNOSIS — I10 HYPERTENSION, UNSPECIFIED TYPE: Primary | ICD-10-CM

## 2025-02-12 NOTE — TELEPHONE ENCOUNTER
----- Message from Shawn Queen sent at 2/11/2025  4:56 PM CST -----    ----- Message -----  From: Sherry Ruiz  Sent: 2/11/2025   9:52 AM CST  To: [facility] Staff    Type:  Sooner Reschedule Appointment Request    Caller is requesting a sooner appointment.    Name of Caller:Ms Crane   When is the first available appointment?June     Would the patient rather a call back or a response via [facility]? Call   Best Call Back Number: [phone]  Additional Information: she states she needs to reschedule her appt due to she have to bring her  to an appt please call

## 2025-02-17 ENCOUNTER — OFFICE VISIT (OUTPATIENT)
Dept: INTERNAL MEDICINE | Facility: CLINIC | Age: 75
End: 2025-02-17
Payer: MEDICARE

## 2025-02-17 VITALS
HEIGHT: 64 IN | HEART RATE: 64 BPM | SYSTOLIC BLOOD PRESSURE: 106 MMHG | OXYGEN SATURATION: 98 % | WEIGHT: 184.94 LBS | BODY MASS INDEX: 31.57 KG/M2 | DIASTOLIC BLOOD PRESSURE: 60 MMHG

## 2025-02-17 DIAGNOSIS — Z95.0 CARDIAC PACEMAKER IN SITU: ICD-10-CM

## 2025-02-17 DIAGNOSIS — E66.01 SEVERE OBESITY (BMI 35.0-39.9) WITH COMORBIDITY: ICD-10-CM

## 2025-02-17 DIAGNOSIS — I70.0 ATHEROSCLEROSIS OF AORTA: ICD-10-CM

## 2025-02-17 DIAGNOSIS — N18.31 STAGE 3A CHRONIC KIDNEY DISEASE: ICD-10-CM

## 2025-02-17 DIAGNOSIS — I10 ESSENTIAL HYPERTENSION: ICD-10-CM

## 2025-02-17 DIAGNOSIS — Z98.84 HISTORY OF ROUX-EN-Y GASTRIC BYPASS: ICD-10-CM

## 2025-02-17 DIAGNOSIS — E03.9 ACQUIRED HYPOTHYROIDISM: ICD-10-CM

## 2025-02-17 DIAGNOSIS — Z00.00 ENCOUNTER FOR MEDICARE ANNUAL WELLNESS EXAM: ICD-10-CM

## 2025-02-17 DIAGNOSIS — M81.0 OSTEOPOROSIS, UNSPECIFIED OSTEOPOROSIS TYPE, UNSPECIFIED PATHOLOGICAL FRACTURE PRESENCE: ICD-10-CM

## 2025-02-17 DIAGNOSIS — M81.0 AGE-RELATED OSTEOPOROSIS WITHOUT CURRENT PATHOLOGICAL FRACTURE: ICD-10-CM

## 2025-02-17 DIAGNOSIS — Z79.01 ON ANTICOAGULANT THERAPY: ICD-10-CM

## 2025-02-17 DIAGNOSIS — R00.1 SYMPTOMATIC BRADYCARDIA: ICD-10-CM

## 2025-02-17 DIAGNOSIS — I87.2 VENOUS INSUFFICIENCY: ICD-10-CM

## 2025-02-17 DIAGNOSIS — I48.19 PERSISTENT ATRIAL FIBRILLATION: ICD-10-CM

## 2025-02-17 DIAGNOSIS — Z00.00 ENCOUNTER FOR PREVENTIVE HEALTH EXAMINATION: Primary | ICD-10-CM

## 2025-02-17 DIAGNOSIS — N18.32 STAGE 3B CHRONIC KIDNEY DISEASE: ICD-10-CM

## 2025-02-17 DIAGNOSIS — E66.9 MILD OBESITY: ICD-10-CM

## 2025-02-17 DIAGNOSIS — E55.9 VITAMIN D DEFICIENCY: ICD-10-CM

## 2025-02-17 PROCEDURE — 99214 OFFICE O/P EST MOD 30 MIN: CPT | Mod: PBBFAC

## 2025-02-17 NOTE — PROGRESS NOTES
"  Rosa Maria Vazquez presented for a follow-up Medicare AWV today. The following components were reviewed and updated:    Medical history  Family History  Social history  Allergies and Current Medications  Health Risk Assessment  Health Maintenance  Care Team    **See Completed Assessments for Annual Wellness visit with in the encounter summary    The following assessments were completed:  Depression Screening  Cognitive function Screening  Timed Get Up Test  Whisper Test      Opioid documentation:      Patient does not have a current opioid prescription.          Vitals:    02/17/25 1401   BP: 106/60   BP Location: Right arm   Pulse: 64   SpO2: 98%   Weight: 83.9 kg (184 lb 15.5 oz)   Height: 5' 4" (1.626 m)     Body mass index is 31.75 kg/m².       Physical Exam  Vitals reviewed.   Constitutional:       General: She is not in acute distress.     Appearance: Normal appearance. She is obese. She is not ill-appearing or toxic-appearing.   HENT:      Head: Normocephalic and atraumatic.      Right Ear: Tympanic membrane, ear canal and external ear normal. There is no impacted cerumen.      Left Ear: Tympanic membrane, ear canal and external ear normal. There is no impacted cerumen.      Nose: Nose normal. No congestion or rhinorrhea.      Mouth/Throat:      Mouth: Mucous membranes are moist.      Pharynx: Oropharynx is clear. No oropharyngeal exudate or posterior oropharyngeal erythema.   Eyes:      General:         Right eye: No discharge.         Left eye: No discharge.      Conjunctiva/sclera: Conjunctivae normal.   Cardiovascular:      Rate and Rhythm: Normal rate and regular rhythm.      Heart sounds: Normal heart sounds. No murmur heard.     No friction rub. No gallop.      Comments: Pacemaker in place  Pulmonary:      Effort: Pulmonary effort is normal. No respiratory distress.      Breath sounds: Normal breath sounds. No stridor. No wheezing, rhonchi or rales.   Abdominal:      General: There is no distension.      " Palpations: Abdomen is soft.      Tenderness: There is no abdominal tenderness. There is no guarding.   Musculoskeletal:         General: Normal range of motion.      Cervical back: Normal range of motion.      Right lower leg: No edema.      Left lower leg: No edema.   Skin:     General: Skin is warm and dry.      Findings: Rash present.      Comments: Eczema on hands    Neurological:      General: No focal deficit present.      Mental Status: She is alert and oriented to person, place, and time.   Psychiatric:         Mood and Affect: Mood normal.         Behavior: Behavior normal.         Thought Content: Thought content normal.         Judgment: Judgment normal.         Diagnoses and health risks identified today and associated recommendations/orders:  1. Encounter for preventive health examination  All age-related screenings and HM measures discussed with patient. Offered urine creatinine screening to patient, patient opted to wait to complete this until she goes to her nephrology appt. Informed and educated patient on need for Dexa scan, patient agreeable, patient to schedule Dexa scan at check out today. All other screenings and all vaccines up to date.     2. Essential hypertension  Stable, continue diltiazem and lisinopril as prescribed, f/u with PCP.    3. Venous insufficiency  Stable, continue wearing compression stockings, f/u with PCP and cardiology.    4. Atherosclerosis of aorta  Stable, f/u with PCP and cardiology.    5. Persistent atrial fibrillation  Stable, continue Eliquis, Diltiazem, and Flecainide as prescribed, f/u with cardiology.    6. Cardiac pacemaker in situ - LBBA lead  Stable, f/u with cardiology.    7. Symptomatic bradycardia  Stable, f/u with cardiology.    8. Stage 3a chronic kidney disease  Stable, f/u with nephrology.    9. Stage 3b chronic kidney disease  Stable, f/u with nephrology.    10. On anticoagulant therapy  Stable, f/u with cardiology.    11. Acquired  hypothyroidism  Stable, continue Synthroid as prescribed, f/u with endocrinology.    12. Osteoporosis, unspecified osteoporosis type, unspecified pathological fracture presence  Stable, continue Fosamax as prescribed, patient to schedule Dexa scan, f/u with PCP.     13. Mild obesity  Stable, no meds, f/u with PCP.    14. Vitamin D deficiency  Stable, continue Citracal + D as prescribed, f/u with PCP.    15. Age-related osteoporosis without current pathological fracture  Stable, continue Fosamax as prescribed, patient to schedule Dexa scan, f/u with PCP.    16. Severe obesity (BMI 35.0-39.9) with comorbidity  Stable, no meds, f/u with PCP.    17. History of Naga-en-Y gastric bypass  Stable, continue Pepcid as prescribed, f/u with PCP.        Provided Rosa Maria with a 5-10 year written screening schedule and personal prevention plan. Recommendations were developed using the USPSTF age appropriate recommendations. Education, counseling, and referrals were provided as needed.  After Visit Summary printed and given to patient which includes a list of additional screenings\tests needed.    Follow up in about 1 year (around 2/17/2026) for Annual Medicare Wellness Visit.      Smita Brothers NP      I offered to discuss advanced care planning, including how to pick a person who would make decisions for you if you were unable to make them for yourself, called a health care power of , and what kind of decisions you might make such as use of life sustaining treatments such as ventilators and tube feeding when faced with a life limiting illness recorded on a living will that they will need to know. (How you want to be cared for as you near the end of your natural life)     X Patient is interested in learning more about how to make advanced directives.  I provided them paperwork and offered to discuss this with them.

## 2025-02-17 NOTE — PATIENT INSTRUCTIONS
Counseling and Referral of Other Preventative  (Italic type indicates deductible and co-insurance are waived)    Patient Name: Rosa Maria Vazquez  Today's Date: 2/17/2025    Health Maintenance       Date Due Completion Date    Annual UACr Never done ---    DEXA Scan 12/08/2024 12/8/2022    Override on 6/1/2020: Done (done at [facility])    Mammogram 06/06/2025 6/6/2024    Hemoglobin A1c (Prediabetes) 08/20/2025 8/20/2024    Colorectal Cancer Screening 01/01/2026 1/1/2016 (Done)    Override on 1/1/2016: Done (Done in [place], [place])    High Dose Statin 02/17/2026 2/17/2025    TETANUS VACCINE 10/31/2028 10/31/2018    Lipid Panel 12/21/2028 12/21/2023        No orders of the defined types were placed in this encounter.    The following information is provided to all patients.  This information is to help you find resources for any of the problems found today that may be affecting your health:                  Living healthy guide: www.[facility].[place].gov      Understanding Diabetes: www.diabetes.org      Eating healthy: www.cdc.gov/healthyweight      CDC home safety checklist: www.cdc.gov/steadi/patient.html      Agency on Aging: www.goea.[place].gov      Alcoholics anonymous (AA): www.aa.org      Physical Activity: www.leon.nih.gov/ol0dnoz      Tobacco use: www.quitwithusla.org

## 2025-02-20 RX ORDER — DILTIAZEM HYDROCHLORIDE 120 MG/1
120 CAPSULE, EXTENDED RELEASE ORAL
Qty: 30 CAPSULE | Refills: 11 | Status: SHIPPED | OUTPATIENT
Start: 2025-02-20

## 2025-02-27 DIAGNOSIS — I48.19 PERSISTENT ATRIAL FIBRILLATION: Primary | ICD-10-CM

## 2025-03-17 ENCOUNTER — CLINICAL SUPPORT (OUTPATIENT)
Dept: CARDIOLOGY | Facility: HOSPITAL | Age: 75
End: 2025-03-17
Payer: MEDICARE

## 2025-03-17 ENCOUNTER — CLINICAL SUPPORT (OUTPATIENT)
Dept: CARDIOLOGY | Facility: HOSPITAL | Age: 75
End: 2025-03-17
Attending: INTERNAL MEDICINE
Payer: MEDICARE

## 2025-03-17 DIAGNOSIS — R00.1 BRADYCARDIA, UNSPECIFIED: ICD-10-CM

## 2025-03-17 DIAGNOSIS — Z95.0 PRESENCE OF CARDIAC PACEMAKER: ICD-10-CM

## 2025-03-17 DIAGNOSIS — I44.0 ATRIOVENTRICULAR BLOCK, FIRST DEGREE: ICD-10-CM

## 2025-03-17 PROCEDURE — 93294 REM INTERROG EVL PM/LDLS PM: CPT | Mod: ,,, | Performed by: INTERNAL MEDICINE

## 2025-03-18 NOTE — PROGRESS NOTES
Ms. Vazquez is a patient of Dr. Chance and was last seen in clinic 10/11/24.      Subjective:   Patient ID:  Rosa Maria Vazquez is a 74 y.o. female who presents for follow up of PPM  .     HPI:    Ms. Vazquez is a 74 y.o. female with  hypertension, obesity s/p gastric bypass, hypothyroidism, aortic atherosclerotic disease, persistent AF, symptomatic bradycardia s/p PPM with LBBA pacing lead.    Background:  I had the pleasure of seeing Mrs. Vazquez today in our electrophysiology clinic in follow-up for her atrial fibrillation. As you are aware she is a pleasant 73 year-old woman with hypertension, obesity s/p gastric bypass, hypothyroidism, aortic atherosclerotic disease, and persistent AF. She moved to the [place] area a few years ago to be near with family. She established care with Dr. Li. AF was incidentally observed on a recent ECG during follow-up (controlled to bradycardic rates). She endorsed intermittent light-headedness. A holter monitor was ordered which showed persistent AF with ventricular rates at times in the 30s-50s corresponding to symptoms. Average ventricular rate was 44 bpm. She is no on any AV nikole blocking agents. She is referred for consideration for PPM implantation for symptomatic bradycardia. She notes fatigue x 2 months and light-headedness with an episode of near syncope. She first saw me in 6/2023 and we discussed options. Elected for PPM then AAD therapy.     An echocardiogram from 2021 noted preserved LV function with mild LA enlargement with impaired relaxation.     I reviewed available ECGs in Epic which are only from May of 2023 which show AF with controlled ventricular rates. QRS is narrow.     6/19/2023: Successful implantation of PPM Dual with RV lead placed at the left bundle branch pacing area region.     7/18/2023: Cardioversion was successfully performed with restoration of normal sinus rhythm. Started on flecainide.      8/15/2023: She is now 2 mo s/p PPM implantation  with LBBA pacing lead. She subsequently was cardioverted 1 mo ago and placed on flecainide 100mg BID. TIM EF 55%. She is feeling significant symptom improvement.      3/2024: Seen by Mandy Ott. Was doing well.     10/2024: Mrs. Vazquez returns for follow-up. Feels well overall but occasional has increased symptomatic PVCs. Also noticing at times when she gets emotional or laughs she gets very short of breath.    My interpretation of today's in-clinic PPM check is stable lead parameters with 98% AV pacing and 9.7 years of estimated battery longevity. 0.1% AF burden.   My interpretation of today's in-clinic ECG is AV paced with LVAT of 70ms.  Very rare AF on flecainide. Will update echo.       Update (04/02/2025):    Today she states she feels overall well. Feels she still has some occasional PVCs.  Denies CP, SOB, dizziness, syncope, palpitations.    She is currently taking Eliquis 5mg BID for stroke prophylaxis and denies significant bleeding episodes. She is currently being treated with flecainide 100mg BID for rhythm control and diltiazem 120mg daily for HR control.  Kidney function is stable, with a creatinine of 1.3 on 8/2024.     Device Interrogation (04/02/2025) reveals an intrinsic CB @ 48bpm with 3rd degree AVB junctional escape with stable lead and device function. No arrhythmias or treated episodes were noted. 0% AF burden since 10/2024. She paces 99% in the RA and 99% in the RV(LBBAP). Estimated battery longevity 9.2 years.      I have personally reviewed the patient's EKG today, which shows AV dual paced rhythm at 83 bpm. VA interval is 218. QRS is 140. QTc is 434.  LVAT of about 80ms    Relevant Cardiac Test Results:    2D Echo (10/2024):   Left Ventricle: The left ventricle is normal in size. Normal wall thickness. There is normal systolic function with a visually estimated ejection fraction of 55 - 60%. There is normal diastolic function.   Right Ventricle: The right ventricular cavity is at  the upper limits of normal in size. Wall thickness is normal. Systolic function is normal. Pacemaker lead present in the ventricle.   The left atrium is severely dilated.   Aortic Valve: The aortic valve is a trileaflet valve. There is mild aortic valve sclerosis. There is moderate annular calcification present.   Tricuspid Valve: There is mild regurgitation.   Pulmonary Artery: The estimated pulmonary artery systolic pressure is 31 mmHg.   IVC/SVC: Intermediate venous pressure at 8 mmHg.      Current Outpatient Medications   Medication Sig    alendronate (FOSAMAX) 70 MG tablet TAKE 1 TABLET (70 MG TOTAL) BY MOUTH EVERY 7 DAYS    apixaban (ELIQUIS) 5 mg Tab Take 1 tablet (5 mg total) by mouth 2 (two) times daily.    betamethasone dipropionate (DIPROLENE) 0.05 % ointment USE TWICE DAILY ON RED ITCHY RASH ON HANDS    calcium citrate-vitamin D3 315-200 mg (CITRACAL+D) 315 mg-5 mcg (200 unit) per tablet Take 1 tablet by mouth 2 (two) times daily.    cyanocobalamin 1,000 mcg/mL injection INJECT 1 ML (1,000 MCG) INTRAMUSCULARLY EVERY 30 DAYS    diltiaZEM (DILACOR XR) 120 MG CDCR TAKE 1 CAPSULE BY MOUTH ONCE DAILY    flecainide (TAMBOCOR) 100 MG Tab Take 1 tablet (100 mg total) by mouth every 12 (twelve) hours.    hydrOXYzine HCL (ATARAX) 25 MG tablet Take 1 tablet (25 mg total) by mouth 3 (three) times daily as needed for Itching.    levothyroxine (SYNTHROID) 112 MCG tablet TAKE 1 TABLET BY MOUTH EVERY DAY BEFORE BREAKFAST    lisinopriL 10 MG tablet Take 1 tablet (10 mg total) by mouth once daily.    multivitamin capsule Take 1 capsule by mouth once daily.    rosuvastatin (CRESTOR) 20 MG tablet TAKE 1 TABLET BY MOUTH EVERY DAY    famotidine (PEPCID) 20 MG tablet Take 1 tablet (20 mg total) by mouth 2 (two) times daily.     No current facility-administered medications for this visit.       Review of Systems   Constitutional: Negative for chills, diaphoresis, fever and malaise/fatigue.   HENT: Negative.     Eyes:  Negative  for pain and visual disturbance.   Cardiovascular:  Negative for chest pain, dyspnea on exertion, irregular heartbeat, leg swelling, near-syncope, orthopnea, palpitations and syncope.   Respiratory:  Negative for cough, hemoptysis and wheezing.    Endocrine: Negative.    Hematologic/Lymphatic: Negative.    Skin: Negative.  Negative for rash.   Musculoskeletal:  Negative for falls, joint swelling and myalgias.   Gastrointestinal: Negative.  Negative for hematemesis and melena.   Genitourinary: Negative.  Negative for hematuria.   Neurological:  Negative for dizziness, focal weakness, headaches and light-headedness.   Psychiatric/Behavioral:  Negative for altered mental status.        Objective:          BP (!) 108/57 (Patient Position: Sitting)   Pulse 83     Physical Exam  Vitals reviewed.   Constitutional:       General: She is not in acute distress.     Appearance: She is not ill-appearing.   HENT:      Head: Atraumatic.      Nose: No congestion.   Eyes:      Extraocular Movements: Extraocular movements intact.   Cardiovascular:      Rate and Rhythm: Normal rate.      Pulses: Normal pulses.      Comments: AV paced  Pulmonary:      Effort: Pulmonary effort is normal. No respiratory distress.   Abdominal:      General: Abdomen is flat.      Palpations: Abdomen is soft.   Musculoskeletal:         General: Normal range of motion.      Cervical back: Normal range of motion.      Right lower leg: No edema.      Left lower leg: No edema.   Skin:     General: Skin is warm and dry.      Findings: No rash.   Neurological:      General: No focal deficit present.      Mental Status: She is alert and oriented to person, place, and time. Mental status is at baseline.   Psychiatric:         Mood and Affect: Mood normal.         Behavior: Behavior normal. Behavior is cooperative.           Lab Results   Component Value Date     08/09/2024    K 4.9 08/09/2024    MG 2.3 11/19/2021    BUN 30 (H) 08/09/2024    CREATININE 1.3  08/09/2024    ALT 19 06/24/2024    AST 22 06/24/2024    HGB 12.0 08/09/2024    HCT 36.8 (L) 08/09/2024    TSH 2.249 08/20/2024    LDLCALC 65.4 12/21/2023       Recent Labs   Lab 06/19/23  0755 07/11/23  0853   INR 1.0 1.0       Assessment:     1. Cardiac pacemaker in situ - LBBA lead    2. Persistent atrial fibrillation    3. Essential hypertension        Plan:     In summary, Ms. Vazquez is a 74 y.o. female with  hypertension, obesity s/p gastric bypass, hypothyroidism, aortic atherosclerotic disease, persistent AF, symptomatic bradycardia s/p PPM with LBBA pacing lead here for routine f/u.  Doing well from an arrhythmia standpoint. Device shows stable lead and device function. No arrhythmias or treated episodes were noted. 0% AF burden since 10/2024. She paces 99% in the RA and 99% in the RV(LBBAP). Estimated battery longevity 9.2 years.  EKG with stable QRS and LVAT of about 80ms.  Continue current regimen with AC and flecainide.  Continue routine device checks as scheduled.  F/u 6 months    *A copy of this note has been sent to Dr. Chance*    Follow up in about 6 months (around 10/2/2025), or if symptoms worsen or fail to improve.    ------------------------------------------------------------------    Keiry Handley DNP  Cardiac Electrophysiology

## 2025-03-25 LAB
OHS CV AF BURDEN PERCENT: < 1
OHS CV DC REMOTE DEVICE TYPE: NORMAL
OHS CV RV PACING PERCENT: 99.42 %

## 2025-03-31 RX ORDER — LISINOPRIL 10 MG/1
10 TABLET ORAL DAILY
Qty: 90 TABLET | Refills: 3 | Status: SHIPPED | OUTPATIENT
Start: 2025-03-31

## 2025-04-02 ENCOUNTER — HOSPITAL ENCOUNTER (OUTPATIENT)
Dept: CARDIOLOGY | Facility: CLINIC | Age: 75
Discharge: HOME | End: 2025-04-02
Payer: MEDICARE

## 2025-04-02 ENCOUNTER — OFFICE VISIT (OUTPATIENT)
Dept: ELECTROPHYSIOLOGY | Facility: CLINIC | Age: 75
End: 2025-04-02
Payer: MEDICARE

## 2025-04-02 ENCOUNTER — CLINICAL SUPPORT (OUTPATIENT)
Dept: CARDIOLOGY | Facility: HOSPITAL | Age: 75
End: 2025-04-02
Attending: INTERNAL MEDICINE
Payer: MEDICARE

## 2025-04-02 VITALS — DIASTOLIC BLOOD PRESSURE: 57 MMHG | SYSTOLIC BLOOD PRESSURE: 108 MMHG | HEART RATE: 83 BPM

## 2025-04-02 DIAGNOSIS — I10 ESSENTIAL HYPERTENSION: ICD-10-CM

## 2025-04-02 DIAGNOSIS — I48.20 CHRONIC ATRIAL FIBRILLATION: ICD-10-CM

## 2025-04-02 DIAGNOSIS — I48.19 PERSISTENT ATRIAL FIBRILLATION: ICD-10-CM

## 2025-04-02 DIAGNOSIS — Z95.0 CARDIAC PACEMAKER IN SITU: Primary | ICD-10-CM

## 2025-04-02 LAB
OHS QRS DURATION: 140 MS
OHS QRS DURATION: 98 MS
OHS QTC CALCULATION: 355 MS
OHS QTC CALCULATION: 434 MS

## 2025-04-02 PROCEDURE — 93005 ELECTROCARDIOGRAM TRACING: CPT | Mod: PBBFAC | Performed by: INTERNAL MEDICINE

## 2025-04-02 PROCEDURE — 99212 OFFICE O/P EST SF 10 MIN: CPT | Mod: PBBFAC,25

## 2025-04-02 PROCEDURE — 93005 ELECTROCARDIOGRAM TRACING: CPT | Performed by: INTERNAL MEDICINE

## 2025-04-02 PROCEDURE — 93280 PM DEVICE PROGR EVAL DUAL: CPT

## 2025-04-02 PROCEDURE — 99999 PR PBB SHADOW E&M-EST. PATIENT-LVL II: CPT | Mod: PBBFAC,,,

## 2025-04-02 PROCEDURE — 93010 ELECTROCARDIOGRAM REPORT: CPT | Mod: S$PBB,,, | Performed by: INTERNAL MEDICINE

## 2025-04-10 DIAGNOSIS — I48.19 PERSISTENT ATRIAL FIBRILLATION: ICD-10-CM

## 2025-04-10 RX ORDER — FLECAINIDE ACETATE 100 MG/1
100 TABLET ORAL EVERY 12 HOURS
Qty: 180 TABLET | Refills: 3 | Status: SHIPPED | OUTPATIENT
Start: 2025-04-10

## 2025-04-10 NOTE — TELEPHONE ENCOUNTER
----- Message from Roxanne sent at 4/4/2025  1:37 PM CDT -----  Regarding Refill : flecainide (TAMBOCOR) 100 MG TabShe can be reached at [phone].[id number] - [place] - [address] Phone: [phone]: [phone] you

## 2025-04-25 ENCOUNTER — OFFICE VISIT (OUTPATIENT)
Dept: URGENT CARE | Facility: CLINIC | Age: 75
End: 2025-04-25
Payer: MEDICARE

## 2025-04-25 VITALS
BODY MASS INDEX: 30.22 KG/M2 | OXYGEN SATURATION: 97 % | HEART RATE: 85 BPM | SYSTOLIC BLOOD PRESSURE: 121 MMHG | WEIGHT: 177 LBS | TEMPERATURE: 99 F | HEIGHT: 64 IN | RESPIRATION RATE: 16 BRPM | DIASTOLIC BLOOD PRESSURE: 80 MMHG

## 2025-04-25 DIAGNOSIS — S90.561A INSECT BITE OF RIGHT ANKLE, INITIAL ENCOUNTER: Primary | ICD-10-CM
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
## 2025-04-25 NOTE — PATIENT INSTRUCTIONS
Please begin taking a daily oral antihistamine such as Zyrtec for the duration of your symptoms.  They should begin to recede within a couple of days.    A topical steroid such as hydrocortisone, as we discussed, is helpful with itching, redness and swelling to the area.    Ice can also be helpful.  Please do not apply ice directly to the skin or it can damage the skin.  Wrapped the ice in a towel or in a baggy wrapped in a towel.

## 2025-04-25 NOTE — PROGRESS NOTES
"Subjective:      Patient ID: Rosa Maria Vazquez is a 74 y.o. female.    Vitals:  height is 5' 4" (1.626 m) and weight is 80.3 kg (177 lb). Her oral temperature is 98.5 °F (36.9 °C). Her blood pressure is 121/80 and her pulse is 85. Her respiration is 16 and oxygen saturation is 97%.     Chief Complaint: Insect Bite    This pt complains of rash to right ankle problem x today. Pt states she hot bit by a "fire ant" on her right ankle. S/S: redness and itchiness. No nausea, vomiting, diarrhea, or fever.     Provider note begins below:    Patient comes to the clinic shortly after having been bitten by an and on the ankle.  She was concerned because she had had an infection in the past from and bites and notice the redness that occurred shortly after being bitten.  States it was quite itchy and has since improved somewhat but still burns.    Denies difficulty breathing, swallowing or sensation of mouth or throat swelling.    Insect Bite  This is a new problem. The current episode started today. The problem occurs constantly. The problem has been unchanged. Pertinent negatives include no chills, congestion, coughing, fever, headaches, nausea, sore throat or swollen glands.       Constitution: Negative for chills and fever.   HENT:  Negative for congestion and sore throat.    Respiratory:  Negative for cough.    Gastrointestinal:  Negative for nausea.   Skin:  Negative for erythema.   Neurological:  Negative for headaches.      Objective:     Physical Exam   Constitutional: She is oriented to person, place, and time. She appears well-developed.   HENT:   Head: Normocephalic and atraumatic. Head is without abrasion, without contusion and without laceration.   Ears:   Right Ear: External ear normal.   Left Ear: External ear normal.   Nose: Nose normal.   Mouth/Throat: Oropharynx is clear and moist and mucous membranes are normal.   Eyes: Conjunctivae, EOM and lids are normal. Pupils are equal, round, and reactive to light.   Neck: " Trachea normal and phonation normal. Neck supple.   Cardiovascular: Normal rate, regular rhythm and normal heart sounds.   Pulmonary/Chest: Effort normal and breath sounds normal. No stridor. No respiratory distress.   Musculoskeletal: Normal range of motion.         General: Normal range of motion.   Neurological: She is alert and oriented to person, place, and time.   Skin: Skin is warm, dry, intact, rash and macular. Capillary refill takes less than 2 seconds. No abrasion, No burn, No bruising, No erythema and No ecchymosis        Psychiatric: Her speech is normal and behavior is normal. Judgment and thought content normal.   Nursing note and vitals reviewed.      Assessment:     1. Insect bite of right ankle, initial encounter        Plan:   MDM: Erythematous macular lesion to medial right ankle suggesting histamine response after insect bite.  Incident occurred less than an hour ago and it is unlikely/low suspicion that there is in infectious process this soon after ant bite and is more than likely a localized histamine response.    Insect bite of right ankle, initial encounter        Patient Instructions   Please begin taking a daily oral antihistamine such as Zyrtec for the duration of your symptoms.  They should begin to recede within a couple of days.    A topical steroid such as hydrocortisone, as we discussed, is helpful with itching, redness and swelling to the area.    Ice can also be helpful.  Please do not apply ice directly to the skin or it can damage the skin.  Wrapped the ice in a towel or in a baggy wrapped in a towel.

## 2025-05-07 NOTE — PROGRESS NOTES
"  [facility]  5/9/2025          Reason for Visit:      had concerns including Follow-up (6m).       History of Present Illness:     Patient presents today for follow up    Afib/Aflutter  Eliquis 5 mg bid  Flecainide 100 bid  Diltiazem  daily  Following with Dr. Li  She experienced one episode of atrial fibrillation lasting an entire day with rapid heart rate and shortness of breath that resolved spontaneously. She experiences PVCs during walks described as "being kicked by a horse" which resolve with cessation of activity.  She walks with her  3 times per week.         Dyspnea during excitement  She reports new onset breathing difficulty occurring exclusively during emotional situations, specifically when laughing hard or crying. She describes inability to catch breath during these episodes, requiring her to stop activity and focus on breathing to recover. She denies wheezing, difficulty breathing when lying flat, or shortness of breath with walking or other physical activities.       Hypothyroidism  Levothyroxine 112  Normal Tsh and Free T4 in December 2023  She is agreable to repeat that today     Osteoporosis   Alendronate 70mg  Dexa done 12/2022 showed T score -2.5.  Started alendronate then.  Takes calcium and a vitamin D supplement.    No side effects    Agreeable to repeat - ordered today     CKD3a  Her kidney function has been mildly reduced in the stage 3a range.    Seeing Dr. Brock (nephrology)  Cyst on kidney on US  Recheck labs     HTN  Lisinopril 10 mg daily  Diltiazem   Blood pressure has been controlled on current meds     Aortic atherosclerosis  Crestor 10 mg  PT saw vascular surgery for venous insufficiency and they noted some plaque.  She is on crestor and tolerating.     HM  Reviewed HM  Encouraged covid/flu shots this fall at pharamcy      Problem List:     Problem List[1]      Medications:     Current Medications[2]      Review of Systems: " "    Review of Systems   Constitutional:  Negative for chills and fever.   HENT:  Negative for ear pain and sore throat.    Respiratory:  Negative for cough, shortness of breath and wheezing.    Cardiovascular:  Negative for chest pain and palpitations.   Gastrointestinal:  Negative for constipation, diarrhea, nausea and vomiting.   Genitourinary:  Negative for dysuria and hematuria.   Musculoskeletal:  Negative for joint swelling and joint deformity.   Neurological:  Negative for dizziness and weakness.         Physical Exam:     Temp:             Blood Pressure:  (!) 101/57        Pulse:         Respirations:     Weight:   79.6 kg (175 lb 7.8 oz)  Height:   5' 4" (1.626 m)  BMI:     Body mass index is 30.12 kg/m².    Physical Exam  Constitutional:       General: She is not in acute distress.  HENT:      Right Ear: Tympanic membrane normal.      Left Ear: Tympanic membrane normal.      Nose: No congestion or rhinorrhea.      Mouth/Throat:      Pharynx: No oropharyngeal exudate or posterior oropharyngeal erythema.   Cardiovascular:      Rate and Rhythm: Normal rate and regular rhythm.   Pulmonary:      Effort: Pulmonary effort is normal. No respiratory distress.      Breath sounds: No wheezing.   Abdominal:      Palpations: Abdomen is soft.      Tenderness: There is no abdominal tenderness.   Skin:     General: Skin is warm.   Neurological:      General: No focal deficit present.      Mental Status: She is alert and oriented to person, place, and time.         Labs/Imaging/Testing:     Most recent CBC:  Lab Results   Component Value Date    WBC 4.55 08/09/2024    HGB 12.0 08/09/2024     08/09/2024    MCV 91 08/09/2024       Most recent Lipids:  Lab Results   Component Value Date    CHOL 133 12/21/2023    HDL 57 12/21/2023    LDLCALC 65.4 12/21/2023    TRIG 53 12/21/2023       Most recent Chemistry:  Lab Results   Component Value Date     08/09/2024    K 4.9 08/09/2024     08/09/2024    CO2 22 (L) " 08/09/2024    BUN 30 (H) 08/09/2024    CREATININE 1.3 08/09/2024    GLU 74 08/09/2024    CALCIUM 9.8 08/09/2024    ALT 19 06/24/2024    AST 22 06/24/2024    ALKPHOS 68 06/24/2024    PROT 7.3 06/24/2024    ALBUMIN 3.6 08/09/2024       Other tests:  Lab Results   Component Value Date    TSH 2.249 08/20/2024    FREET4 1.02 08/20/2024    INR 1.0 07/11/2023    HGBA1C 5.0 08/20/2024    FERRITIN 30 11/19/2021    TTZUZBOP83 1186 (H) 11/19/2021    VVUKLQXA84UC 54 12/21/2023    MG 2.3 11/19/2021       Assessment and Plan:     Visit Summary:  Assessed AFib management, noting recent episodes of nosebleeds likely related to Eliquis.  Evaluated recent AFib episode reported by patient, which resolved on its own.  Reviewed thyroid medication (levothyroxine) and determined need for updated lab work.  Considered pulmonary function tests and chest XR for unexplained episodic shortness of breath, but deemed unnecessary at this time due to atypical presentation.  Considered potential pulmonary toxicity from flecainide, but symptoms do not align with typical presentation.    - Continue Crestor, alendronate, Eliquis, Flecainide, Diltiazem, and levothyroxine  - Continue Pepcid as needed for reflux management  - Ordered mammogram (to be scheduled after July 26, 2025)  - Ordered DEXA scan  - Ordered thyroid function labs  - Consider chest XR and pulmonary function test for unexplained shortness of breath  - Recommend Afrin nasal spray for temporary nosebleed management  - Consider ENT referral for cauterization if nosebleeds persist  - Follow up in 6 months with Dr. Chance, including EKG and nurse practitioner evaluation       1. Essential hypertension  - CBC Auto Differential; Future  - Comprehensive Metabolic Panel; Future  - Lipid Panel; Future  - Hemoglobin A1C; Future  - TSH; Future  - T4, Free; Future    2. Persistent atrial fibrillation  - Patient experiences episodes of atrial fibrillation lasting a whole day, accompanied by  shortness of breath and rapid heart rate.  - Patient maintains regular appointments every 6 months with Dr. Chance, including EKGs and evaluations by a nurse practitioner.  - Continue Eliquis, Flecainide, and Diltiazem for AFib management.  - Patient experienced 2 episodes of severe nosebleeds while on Eliquis, one lasting all day.  - Recommend using Afrin nasal spray as a temporary measure for managing nosebleeds, emphasizing it is not for long-term use.  - Discussed potential ENT referral for cauterization if nosebleeds persist.  - Instructed patient to monitor for recurrence and report if they become frequent.    3. Osteoporosis, unspecified osteoporosis type, unspecified pathological fracture presence  - Patient is currently taking alendronate for bone density and reports no issues or side ef  fects.  - Reviewed medication list and confirmed continued use.  - Continue alendronate for bone health management.  - Ordered DEXA scan.  - DXA Bone Density Axial Skeleton 1 or more sites; Future    4. Encounter for screening mammogram for malignant neoplasm of breast  - Mammo Digital Screening Bilat w/ [facility] (XPD); Future    5. Acquired hypothyroidism  - Continue levothyroxine for thyroid management and ordered thyroid function labs.  - CBC Auto Differential; Future  - Comprehensive Metabolic Panel; Future  - Lipid Panel; Future  - Hemoglobin A1C; Future  - TSH; Future  - T4, Free; Future    6. Stage 3a chronic kidney disease  - CBC Auto Differential; Future  - Comprehensive Metabolic Panel; Future  - Lipid Panel; Future  - Hemoglobin A1C; Future  - TSH; Future  - T4, Free; Future    7. IFG (impaired fasting glucose)  - Hemoglobin A1C; Future    8. Secondary hyperparathyroidism  Seeing nephrology    - Patient to continue current walking routine.  - Continue Crestor.  - Ordered mammogram (to be scheduled after July 26, 2025).  - Follow up in 6 months.  - Patient instructed to contact the office if medication refills are  needed before next appointment.              Neftaly Lebron MD  5/9/2025    This note was prepared using voice-recognition software.  Although efforts are made to proofread the note, some errors may persist in the final document.         [1]   Patient Active Problem List  Diagnosis    Essential hypertension    Acquired hypothyroidism    Osteoporosis    History of Naga-en-Y gastric bypass    Mild obesity    Stage 3a chronic kidney disease    Vitamin D deficiency    Venous insufficiency    Atherosclerosis of aorta    Persistent atrial fibrillation    Symptomatic bradycardia    Cardiac pacemaker in situ - LBBA lead    Age-related osteoporosis without current pathological fracture    Severe obesity (BMI 35.0-39.9) with comorbidity    On anticoagulant therapy    Stage 3b chronic kidney disease    Encounter for preventive health examination    Secondary hyperparathyroidism   [2]   Current Outpatient Medications:     alendronate (FOSAMAX) 70 MG tablet, TAKE 1 TABLET (70 MG TOTAL) BY MOUTH EVERY 7 DAYS, Disp: 4 tablet, Rfl: 11    apixaban (ELIQUIS) 5 mg Tab, Take 1 tablet (5 mg total) by mouth 2 (two) times daily., Disp: 180 tablet, Rfl: 3    calcium citrate-vitamin D3 315-200 mg (CITRACAL+D) 315 mg-5 mcg (200 unit) per tablet, Take 1 tablet by mouth 2 (two) times daily., Disp: , Rfl:     cyanocobalamin 1,000 mcg/mL injection, INJECT 1 ML (1,000 MCG) INTRAMUSCULARLY EVERY 30 DAYS, Disp: 1 mL, Rfl: 11    diltiaZEM (DILACOR XR) 120 MG CDCR, TAKE 1 CAPSULE BY MOUTH ONCE DAILY, Disp: 30 capsule, Rfl: 11    famotidine (PEPCID) 20 MG tablet, Take 1 tablet (20 mg total) by mouth 2 (two) times daily. (Patient taking differently: Take 20 mg by mouth daily as needed.), Disp: 60 tablet, Rfl: 3    flecainide (TAMBOCOR) 100 MG Tab, Take 1 tablet (100 mg total) by mouth every 12 (twelve) hours., Disp: 180 tablet, Rfl: 3    levothyroxine (SYNTHROID) 112 MCG tablet, TAKE 1 TABLET BY MOUTH EVERY DAY BEFORE BREAKFAST, Disp: 90 tablet, Rfl:  3    lisinopriL 10 MG tablet, Take 1 tablet (10 mg total) by mouth once daily., Disp: 90 tablet, Rfl: 3    multivitamin capsule, Take 1 capsule by mouth once daily., Disp: , Rfl:     rosuvastatin (CRESTOR) 20 MG tablet, TAKE 1 TABLET BY MOUTH EVERY DAY, Disp: 30 tablet, Rfl: 11

## 2025-05-09 ENCOUNTER — OFFICE VISIT (OUTPATIENT)
Dept: PRIMARY CARE CLINIC | Facility: CLINIC | Age: 75
End: 2025-05-09
Payer: MEDICARE

## 2025-05-09 ENCOUNTER — LAB VISIT (OUTPATIENT)
Dept: LAB | Facility: HOSPITAL | Age: 75
End: 2025-05-09
Attending: INTERNAL MEDICINE
Payer: MEDICARE

## 2025-05-09 VITALS
BODY MASS INDEX: 29.96 KG/M2 | WEIGHT: 175.5 LBS | DIASTOLIC BLOOD PRESSURE: 57 MMHG | HEIGHT: 64 IN | SYSTOLIC BLOOD PRESSURE: 101 MMHG

## 2025-05-09 DIAGNOSIS — R73.01 IFG (IMPAIRED FASTING GLUCOSE): ICD-10-CM

## 2025-05-09 DIAGNOSIS — N25.81 SECONDARY HYPERPARATHYROIDISM: ICD-10-CM

## 2025-05-09 DIAGNOSIS — E03.9 ACQUIRED HYPOTHYROIDISM: ICD-10-CM

## 2025-05-09 DIAGNOSIS — Z12.31 ENCOUNTER FOR SCREENING MAMMOGRAM FOR MALIGNANT NEOPLASM OF BREAST: ICD-10-CM

## 2025-05-09 DIAGNOSIS — N18.31 STAGE 3A CHRONIC KIDNEY DISEASE: ICD-10-CM

## 2025-05-09 DIAGNOSIS — I10 ESSENTIAL HYPERTENSION: Primary | ICD-10-CM

## 2025-05-09 DIAGNOSIS — M81.0 OSTEOPOROSIS, UNSPECIFIED OSTEOPOROSIS TYPE, UNSPECIFIED PATHOLOGICAL FRACTURE PRESENCE: ICD-10-CM

## 2025-05-09 DIAGNOSIS — I48.19 PERSISTENT ATRIAL FIBRILLATION: ICD-10-CM

## 2025-05-09 DIAGNOSIS — I10 ESSENTIAL HYPERTENSION: ICD-10-CM

## 2025-05-09 DIAGNOSIS — Z00.00 WELL ADULT EXAM: ICD-10-CM

## 2025-05-09 LAB
ABSOLUTE EOSINOPHIL (OHS): 0.2 K/UL
ABSOLUTE MONOCYTE (OHS): 0.34 K/UL (ref 0.3–1)
ABSOLUTE NEUTROPHIL COUNT (OHS): 1.89 K/UL (ref 1.8–7.7)
ALBUMIN SERPL BCP-MCNC: 3.5 G/DL (ref 3.5–5.2)
ALP SERPL-CCNC: 65 UNIT/L (ref 40–150)
ALT SERPL W/O P-5'-P-CCNC: 17 UNIT/L (ref 10–44)
ANION GAP (OHS): 9 MMOL/L (ref 8–16)
AST SERPL-CCNC: 25 UNIT/L (ref 11–45)
BASOPHILS # BLD AUTO: 0.03 K/UL
BASOPHILS NFR BLD AUTO: 0.8 %
BILIRUB SERPL-MCNC: 0.4 MG/DL (ref 0.1–1)
BUN SERPL-MCNC: 32 MG/DL (ref 8–23)
CALCIUM SERPL-MCNC: 9.1 MG/DL (ref 8.7–10.5)
CHLORIDE SERPL-SCNC: 106 MMOL/L (ref 95–110)
CHOLEST SERPL-MCNC: 127 MG/DL (ref 120–199)
CHOLEST/HDLC SERPL: 2 {RATIO} (ref 2–5)
CO2 SERPL-SCNC: 23 MMOL/L (ref 23–29)
CREAT SERPL-MCNC: 1.2 MG/DL (ref 0.5–1.4)
EAG (OHS): 105 MG/DL (ref 68–131)
ERYTHROCYTE [DISTWIDTH] IN BLOOD BY AUTOMATED COUNT: 15.2 % (ref 11.5–14.5)
GFR SERPLBLD CREATININE-BSD FMLA CKD-EPI: 48 ML/MIN/1.73/M2
GLUCOSE SERPL-MCNC: 78 MG/DL (ref 70–110)
HBA1C MFR BLD: 5.3 % (ref 4–5.6)
HCT VFR BLD AUTO: 37.6 % (ref 37–48.5)
HDLC SERPL-MCNC: 62 MG/DL (ref 40–75)
HDLC SERPL: 48.8 % (ref 20–50)
HGB BLD-MCNC: 11.4 GM/DL (ref 12–16)
IMM GRANULOCYTES # BLD AUTO: 0.02 K/UL (ref 0–0.04)
IMM GRANULOCYTES NFR BLD AUTO: 0.6 % (ref 0–0.5)
LDLC SERPL CALC-MCNC: 54.2 MG/DL (ref 63–159)
LYMPHOCYTES # BLD AUTO: 1.15 K/UL (ref 1–4.8)
MCH RBC QN AUTO: 26.3 PG (ref 27–31)
MCHC RBC AUTO-ENTMCNC: 30.3 G/DL (ref 32–36)
MCV RBC AUTO: 87 FL (ref 82–98)
NONHDLC SERPL-MCNC: 65 MG/DL
NUCLEATED RBC (/100WBC) (OHS): 0 /100 WBC
PLATELET # BLD AUTO: 167 K/UL (ref 150–450)
PMV BLD AUTO: 11.5 FL (ref 9.2–12.9)
POTASSIUM SERPL-SCNC: 4.7 MMOL/L (ref 3.5–5.1)
PROT SERPL-MCNC: 6.9 GM/DL (ref 6–8.4)
RBC # BLD AUTO: 4.33 M/UL (ref 4–5.4)
RELATIVE EOSINOPHIL (OHS): 5.5 %
RELATIVE LYMPHOCYTE (OHS): 31.7 % (ref 18–48)
RELATIVE MONOCYTE (OHS): 9.4 % (ref 4–15)
RELATIVE NEUTROPHIL (OHS): 52 % (ref 38–73)
SODIUM SERPL-SCNC: 138 MMOL/L (ref 136–145)
T4 FREE SERPL-MCNC: 1.07 NG/DL (ref 0.71–1.51)
TRIGL SERPL-MCNC: 54 MG/DL (ref 30–150)
TSH SERPL-ACNC: 1.64 UIU/ML (ref 0.4–4)
WBC # BLD AUTO: 3.63 K/UL (ref 3.9–12.7)

## 2025-05-09 PROCEDURE — 36415 COLL VENOUS BLD VENIPUNCTURE: CPT | Mod: PN

## 2025-05-09 PROCEDURE — 99214 OFFICE O/P EST MOD 30 MIN: CPT | Mod: PBBFAC,PN | Performed by: INTERNAL MEDICINE

## 2025-05-09 PROCEDURE — 80061 LIPID PANEL: CPT

## 2025-05-09 PROCEDURE — 99999 PR PBB SHADOW E&M-EST. PATIENT-LVL IV: CPT | Mod: PBBFAC,,, | Performed by: INTERNAL MEDICINE

## 2025-05-09 PROCEDURE — 84439 ASSAY OF FREE THYROXINE: CPT

## 2025-05-09 PROCEDURE — 82040 ASSAY OF SERUM ALBUMIN: CPT

## 2025-05-09 PROCEDURE — 83036 HEMOGLOBIN GLYCOSYLATED A1C: CPT

## 2025-05-09 PROCEDURE — 84443 ASSAY THYROID STIM HORMONE: CPT

## 2025-05-09 PROCEDURE — 85025 COMPLETE CBC W/AUTO DIFF WBC: CPT

## 2025-05-10 ENCOUNTER — RESULTS FOLLOW-UP (OUTPATIENT)
Dept: PRIMARY CARE CLINIC | Facility: CLINIC | Age: 75
End: 2025-05-10

## 2025-05-12 ENCOUNTER — TELEPHONE (OUTPATIENT)
Dept: PRIMARY CARE CLINIC | Facility: CLINIC | Age: 75
End: 2025-05-12
Payer: MEDICARE

## 2025-05-12 DIAGNOSIS — E03.9 ACQUIRED HYPOTHYROIDISM: Primary | ICD-10-CM

## 2025-05-12 DIAGNOSIS — I10 ESSENTIAL HYPERTENSION: ICD-10-CM

## 2025-05-12 DIAGNOSIS — D64.9 ANEMIA, UNSPECIFIED TYPE: ICD-10-CM

## 2025-05-12 NOTE — TELEPHONE ENCOUNTER
----- Message from Neftaly Lebron MD sent at 5/10/2025  2:51 PM CDT -----  Please advise: There was a very mild anemia, but otherwise the labs looked ok.  The size of the cells was normal.  I think we can monitor and consider checking iron studies if it stays persistent.  Lets plan to recheck in 6 mos  ----- Message -----  From: Lab, Background User  Sent: 5/9/2025   3:14 PM CDT  To: Neftaly Lebron MD

## 2025-05-12 NOTE — TELEPHONE ENCOUNTER
Spoke with pt re; lab results. I advised pt that There was a very mild anemia, but otherwise the labs looked ok. The size of the cells was normal.I think we can monitor and consider checking iron studies if it stays persistent.    Pt okay with repeating labs in 6 months. Please order what is needed so I can schedule.    Thanks

## 2025-05-28 ENCOUNTER — PATIENT OUTREACH (OUTPATIENT)
Dept: ADMINISTRATIVE | Facility: HOSPITAL | Age: 75
End: 2025-05-28
Payer: MEDICARE

## 2025-05-28 DIAGNOSIS — N18.31 STAGE 3A CHRONIC KIDNEY DISEASE: Primary | ICD-10-CM

## 2025-06-02 ENCOUNTER — TELEPHONE (OUTPATIENT)
Dept: NEPHROLOGY | Facility: CLINIC | Age: 75
End: 2025-06-02
Payer: MEDICARE

## 2025-06-03 ENCOUNTER — TELEPHONE (OUTPATIENT)
Dept: NEPHROLOGY | Facility: CLINIC | Age: 75
End: 2025-06-03
Payer: MEDICARE

## 2025-06-10 ENCOUNTER — HOSPITAL ENCOUNTER (OUTPATIENT)
Dept: RADIOLOGY | Facility: HOSPITAL | Age: 75
Discharge: HOME | End: 2025-06-10
Attending: INTERNAL MEDICINE
Payer: MEDICARE

## 2025-06-10 DIAGNOSIS — Z12.31 ENCOUNTER FOR SCREENING MAMMOGRAM FOR MALIGNANT NEOPLASM OF BREAST: ICD-10-CM

## 2025-06-10 PROCEDURE — 77063 BREAST TOMOSYNTHESIS BI: CPT | Mod: 26,,, | Performed by: RADIOLOGY

## 2025-06-10 PROCEDURE — 77067 SCR MAMMO BI INCL CAD: CPT | Mod: 26,,, | Performed by: RADIOLOGY

## 2025-06-10 PROCEDURE — 77063 BREAST TOMOSYNTHESIS BI: CPT | Mod: TC

## 2025-06-16 ENCOUNTER — CLINICAL SUPPORT (OUTPATIENT)
Dept: CARDIOLOGY | Facility: HOSPITAL | Age: 75
End: 2025-06-16
Attending: INTERNAL MEDICINE
Payer: MEDICARE

## 2025-06-16 ENCOUNTER — CLINICAL SUPPORT (OUTPATIENT)
Dept: CARDIOLOGY | Facility: HOSPITAL | Age: 75
End: 2025-06-16
Payer: MEDICARE

## 2025-06-16 DIAGNOSIS — Z95.0 PRESENCE OF CARDIAC PACEMAKER: ICD-10-CM

## 2025-06-16 DIAGNOSIS — I44.0 ATRIOVENTRICULAR BLOCK, FIRST DEGREE: ICD-10-CM

## 2025-06-16 DIAGNOSIS — R00.1 BRADYCARDIA, UNSPECIFIED: ICD-10-CM

## 2025-06-16 PROCEDURE — 93294 REM INTERROG EVL PM/LDLS PM: CPT | Mod: ,,, | Performed by: INTERNAL MEDICINE

## 2025-06-19 ENCOUNTER — OFFICE VISIT (OUTPATIENT)
Dept: CARDIOLOGY | Facility: CLINIC | Age: 75
End: 2025-06-19
Payer: MEDICARE

## 2025-06-19 VITALS
HEART RATE: 76 BPM | DIASTOLIC BLOOD PRESSURE: 70 MMHG | SYSTOLIC BLOOD PRESSURE: 107 MMHG | WEIGHT: 173.94 LBS | BODY MASS INDEX: 29.86 KG/M2

## 2025-06-19 DIAGNOSIS — I48.0 PAROXYSMAL ATRIAL FIBRILLATION: Primary | ICD-10-CM

## 2025-06-19 DIAGNOSIS — I10 ESSENTIAL HYPERTENSION: ICD-10-CM

## 2025-06-19 DIAGNOSIS — I87.2 VENOUS INSUFFICIENCY: ICD-10-CM

## 2025-06-19 DIAGNOSIS — I48.20 CHRONIC ATRIAL FIBRILLATION: ICD-10-CM

## 2025-06-19 DIAGNOSIS — R00.1 SYMPTOMATIC BRADYCARDIA: ICD-10-CM

## 2025-06-19 DIAGNOSIS — Z95.0 CARDIAC PACEMAKER IN SITU: ICD-10-CM

## 2025-06-19 DIAGNOSIS — Z95.0 PRESENCE OF CARDIAC PACEMAKER: ICD-10-CM

## 2025-06-19 DIAGNOSIS — I70.0 ATHEROSCLEROSIS OF AORTA: ICD-10-CM

## 2025-06-19 DIAGNOSIS — R00.1 BRADYCARDIA, UNSPECIFIED: ICD-10-CM

## 2025-06-19 LAB
OHS QRS DURATION: 146 MS
OHS QTC CALCULATION: 464 MS

## 2025-06-19 PROCEDURE — 93005 ELECTROCARDIOGRAM TRACING: CPT | Mod: PBBFAC | Performed by: INTERNAL MEDICINE

## 2025-06-19 PROCEDURE — 99999 PR PBB SHADOW E&M-EST. PATIENT-LVL III: CPT | Mod: PBBFAC,,, | Performed by: INTERNAL MEDICINE

## 2025-06-19 PROCEDURE — 99213 OFFICE O/P EST LOW 20 MIN: CPT | Mod: PBBFAC | Performed by: INTERNAL MEDICINE

## 2025-06-19 RX ORDER — LISINOPRIL 5 MG/1
5 TABLET ORAL DAILY
Qty: 90 TABLET | Refills: 3 | Status: SHIPPED | OUTPATIENT
Start: 2025-06-19

## 2025-06-19 RX ORDER — DILTIAZEM HYDROCHLORIDE 120 MG/1
120 CAPSULE, EXTENDED RELEASE ORAL
Qty: 90 CAPSULE | Refills: 3 | Status: SHIPPED | OUTPATIENT
Start: 2025-06-19

## 2025-06-19 RX ORDER — ROSUVASTATIN CALCIUM 20 MG/1
20 TABLET, COATED ORAL
Qty: 90 TABLET | Refills: 3 | Status: SHIPPED | OUTPATIENT
Start: 2025-06-19

## 2025-06-19 NOTE — PROGRESS NOTES
HISTORY:    74-year-old female with a history of paroxysmal atrial fibrillation status post cardioversion 2023, sick sinus syndrome status post pacemaker 2023 for symptomatic bradycardia, hypertension, venous insufficiency, recurrent right lower extremity cellulitis, hypothyroidism, obesity s/p gastric bypass 2003 and intraocular migraines presenting for follow-up.    Atrial fibrillation incidentally found on ecg in early '23 and then on further questioning and holter monitor appeared to have symptomatic bradycardia.  Status post pacemaker placement and TIM/CV '23 with addition of flecainide therapy.     Patient denies chest pain, shortness of breath, or dyspnea on exertion at this time feels much improved post pacemaker implant and CV.    Activity levels improved. Walking 3x/week 2-4 miles. Down 50 pounds since January '23.     B LE edema has improved with weight loss. Uses compression stockings.    She tolerates diltiazem 120 x 1, flecainide 100 x 2, lisinopril 10 x 1, rosuvastatin 10 x 1, and apixaban 5 x 2. Bps controlled on home log.     The patient denies any previous history of myocardial infarction, coronary artery disease, peripheral arterial disease, stroke, congestive heart failure, or cardiomyopathy.    Patient moved here in '22 from [place]. She is a retired ICU nurse. She moved here to be with her children (Bello and Marva) and grandkids.      PHYSICAL EXAM:    Vitals:    06/19/25 1111   BP: 107/70   Pulse: 76     NAD, A+Ox3.  No jvd, no bruit.  Irreg, irreg, nml s1,s2. No murmurs.  CTA B no wheezes or crackles.  2+ B radial and 1+ B DP/PT. Minimal LE edema mild with chronic lipodermatosclerosis L>R.     LABS/STUDIES (imaging reviewed during clinic visit):    May 2025 CBC and CMP normal.  /HDL 62/LDL 54/TG 54.  A1c and TSH normal.  EKG June 2025 A sensed V paced.  March 2024 AV paced.  August 2023 av dual paced.  May 2023 atrial fibrillation. No Qs/Sts.   Device check March 2025 98% a  paced and 98% V paced.  Minimal a tach/AFib.  Battery life 10 years.    Holter May 2023 atrial fibrillation with average heart rate of 44 beats per minute.  Symptomatic episodes corresponding with heart rates in the range of 35-55.  Slowest ventricular response was 30 beats per minute and the longest pause was 3 seconds, none of which corresponded to symptoms.  TTE October 2024 normal LV size and function with EF of 55-60%.  Normal RV size and function.  No significant valve disease.  2021 normal LV size and function with EF 65%.  Grade 1 diastology.  Basal septal hypertrophy with mild Marvel no significant obstruction.  CVP 3.  TIM July 2023 Nml LVEF w no significant valve ds. No LA appendage clot s/p CV.   Venous duplex December 2022 shows no evidence of DVT bilaterally.  Bilateral GSV reflux is present. L GSV SVT.   Carotid May 2023 No significant ICA ds bilaterally.     ASSESSMENT & PLAN:    1. Paroxysmal atrial fibrillation    2. Bradycardia, unspecified    3. Presence of cardiac pacemaker    4. Symptomatic bradycardia    5. Cardiac pacemaker in situ - LBBA lead    6. Essential hypertension    7. Venous insufficiency    8. Chronic atrial fibrillation    9. Atherosclerosis of aorta                Orders Placed This Encounter    IN OFFICE EKG 12-LEAD (to Muse)    diltiaZEM (DILACOR XR) 120 MG CDCR    apixaban (ELIQUIS) 5 mg Tab    lisinopriL (PRINIVIL,ZESTRIL) 5 MG tablet    rosuvastatin (CRESTOR) 20 MG tablet        Incidental diagnosis of afib in '23 likely long standing s/p CV and PM implant for symptomatic bradycardia on diltiazem and flecainide. CHADSVASC 4 and tolerating apixiban 5x2.     Aortic atherosclerosis noted and tolerating rosuvastatin 20x1 for CV risk reduction. LDL 54.    Bps controlled on diltiazem 120x1 and lisinopril 10x1. Maybe on the low side at times. Can reduce lisinopril to 5x1.     Pt has C4b venous insufficiency bilaterally. Has responded to weight loss. Continue compression stockings and  lifestyle modifications. Recommend continued exercise and increase of walking distance.     Follow up in about 1 year (around 6/19/2026).      Deedee Li MD

## 2025-07-16 ENCOUNTER — LAB VISIT (OUTPATIENT)
Dept: LAB | Facility: HOSPITAL | Age: 75
End: 2025-07-16
Attending: INTERNAL MEDICINE
Payer: MEDICARE

## 2025-07-16 DIAGNOSIS — I10 HYPERTENSION, UNSPECIFIED TYPE: ICD-10-CM

## 2025-07-16 DIAGNOSIS — N18.31 STAGE 3A CHRONIC KIDNEY DISEASE: ICD-10-CM

## 2025-07-16 LAB
ALBUMIN/CREAT UR: NORMAL
BACTERIA #/AREA URNS AUTO: NORMAL /HPF
BILIRUB UR QL STRIP.AUTO: NEGATIVE
CLARITY UR: CLEAR
COLOR UR AUTO: YELLOW
CREAT UR-MCNC: 106 MG/DL (ref 15–325)
CREAT UR-MCNC: 107 MG/DL (ref 15–325)
GLUCOSE UR QL STRIP: NEGATIVE
HGB UR QL STRIP: NEGATIVE
KETONES UR QL STRIP: NEGATIVE
LEUKOCYTE ESTERASE UR QL STRIP: ABNORMAL
MICROALBUMIN UR-MCNC: <5 UG/ML (ref ?–5000)
MICROSCOPIC COMMENT: NORMAL
NITRITE UR QL STRIP: NEGATIVE
PH UR STRIP: 6 [PH]
PROT UR QL STRIP: NEGATIVE
PROT UR-MCNC: 7 MG/DL
PROT/CREAT UR: 0.07 MG/G{CREAT}
RBC #/AREA URNS AUTO: 3 /HPF (ref 0–4)
SP GR UR STRIP: 1.02
UROBILINOGEN UR STRIP-ACNC: NEGATIVE EU/DL
WBC #/AREA URNS AUTO: 1 /HPF (ref 0–5)

## 2025-07-16 PROCEDURE — 82043 UR ALBUMIN QUANTITATIVE: CPT

## 2025-07-16 PROCEDURE — 82570 ASSAY OF URINE CREATININE: CPT

## 2025-07-16 PROCEDURE — 81001 URINALYSIS AUTO W/SCOPE: CPT

## 2025-07-16 NOTE — PROGRESS NOTES
HPI  This 74 y.o. y/o female with PMH of Afib on fleicainide s/p PPM 06/2023, HTN, hypothyroidism came in for CKD.    Renal history  Creatinine   Date Value Ref Range Status   07/16/2025 1.2 0.5 - 1.4 mg/dL Final   05/09/2025 1.2 0.5 - 1.4 mg/dL Final   08/09/2024 1.3 0.5 - 1.4 mg/dL Final   06/24/2024 1.4 0.5 - 1.4 mg/dL Final   12/21/2023 1.1 0.5 - 1.4 mg/dL Final   07/11/2023 1.0 0.5 - 1.4 mg/dL Final   05/23/2023 1.2 0.5 - 1.4 mg/dL Final   09/09/2022 1.3 0.5 - 1.4 mg/dL Final   11/19/2021 1.2 0.5 - 1.4 mg/dL Final   Cr 1.1-1.3 since 2021  Urine Protein/Creatinine Ratio   Date Value Ref Range Status   07/16/2025 0.07 <=0.20 Final     Prot/Creat Ratio, Urine   Date Value Ref Range Status   08/09/2024 Unable to calculate 0.00 - 0.20 Final   06/24/2024 0.05 0.00 - 0.20 Final     Has been drinking with 50-55 oz of fluid a day  Not taking NSAIDs  Father diagnosed with CKD in 70s, not on RRT    HTN  BP this visit 116/80 mmHg  BP at home 130s/60s  Current medication: diltiazem 120 mg daily, lisinopril 5 mg daily  Trying to be compliant with low salt diet  Lost 83 Ibs within 1 year    No DM  Lab Results   Component Value Date    HGBA1C 5.3 05/09/2025     RP US 06/2024  FINDINGS:  Right kidney: The right kidney measures 10.7 cm. Increased echogenicity with cortical thinning. No loss of corticomedullary distinction. Resistive index measures 0.68.  No mass. No renal stone. Mild pelvicaliectasis without obvious hydronephrosis.  There are 2 simple renal cysts, the largest measuring 1.1 x 1.0 x 0.9 cm.     Left kidney: The left kidney measures 10.2 cm. Increased echogenicity with cortical thinning. No loss of corticomedullary distinction. Resistive index measures 0.68.  No mass. No renal stone. Mild pelvicaliectasis without obvious hydronephrosis.  There is a minimally complex cyst measuring 2.2 x 2.2 x 2.0 cm with a thin 2 mm septation.     The bladder is partially distended at the time of scanning and has an unremarkable  appearance.     Splenic RI measures 0.64.     Impression:     Sequela of chronic medical renal disease.     Minimally complex left renal cyst.    Past Medical History:   Diagnosis Date    Anticoagulant long-term use     Atrial fibrillation     Hypertension     Thyroid disease        Past Surgical History:   Procedure Laterality Date    A-V CARDIAC PACEMAKER INSERTION N/A 2023    Procedure: INSERTION, CARDIAC PACEMAKER, DUAL CHAMBER;  Surgeon: Zeke Chance MD;  Location: [facility] EP LAB;  Service: Cardiology;  Laterality: N/A;  SB/AF, Dual PPM with LBAP lead, MDT, MAC, TX, 3 Prep    ECHOCARDIOGRAM,TRANSESOPHAGEAL N/A 2023    Procedure: Transesophageal echo (TIM) intra-procedure log documentation;  Surgeon: Randy Allan MD;  Location: [facility] EP LAB;  Service: Cardiology;  Laterality: N/A;    EYE SURGERY      GASTRIC BYPASS      TREATMENT OF CARDIAC ARRHYTHMIA N/A 2023    Procedure: Cardioversion or Defibrillation;  Surgeon: Zeke Chance MD;  Location: [facility] EP LAB;  Service: Cardiology;  Laterality: N/A;  AF, TIM, DCCV, MAC, TX, 3 Prep** MDT PPM in situ**       Review of patient's allergies indicates:   Allergen Reactions    Lopressor [metoprolol tartrate] Anaphylaxis    Nickel Dermatitis and Itching    Ondansetron Other (See Comments)     Double vision           Social History     Socioeconomic History    Marital status:    Tobacco Use    Smoking status: Former     Current packs/day: 0.00     Average packs/day: 1 pack/day for 10.0 years (10.0 ttl pk-yrs)     Types: Cigarettes     Start date: 10/8/1970     Quit date: 10/21/1980     Years since quittin.7     Passive exposure: Never    Smokeless tobacco: Never   Substance and Sexual Activity    Alcohol use: Never    Drug use: Never    Sexual activity: Not Currently     Partners: Male     Comment:  (no interest)     Social Drivers of Health     Financial Resource Strain: Low Risk  (2025)    Overall Financial Resource Strain (CARDIA)      Difficulty of Paying Living Expenses: Not very hard   Food Insecurity: No Food Insecurity (5/2/2025)    Hunger Vital Sign     Worried About Running Out of Food in the Last Year: Never true     Ran Out of Food in the Last Year: Never true   Transportation Needs: No Transportation Needs (5/2/2025)    PRAPARE - Transportation     Lack of Transportation (Medical): No     Lack of Transportation (Non-Medical): No   Physical Activity: Insufficiently Active (5/2/2025)    Exercise Vital Sign     Days of Exercise per Week: 3 days     Minutes of Exercise per Session: 40 min   Stress: Stress Concern Present (5/2/2025)    [employer] - Occupational Stress Questionnaire     Feeling of Stress : To some extent   Housing Stability: Low Risk  (5/2/2025)    Housing Stability Vital Sign     Unable to Pay for Housing in the Last Year: No     Number of Times Moved in the Last Year: 0     Homeless in the Last Year: No       Family History   Problem Relation Name Age of Onset    Heart disease Mother      Heart disease Father      Diabetes Sister      Protein C deficiency Sister      Protein S deficiency Sister      Rectal cancer Sister         ROS negative except listed above    PHYSICAL EXAMINATION:  Blood pressure 116/80, pulse 93, weight 79.9 kg (176 lb 2.4 oz), SpO2 98%.  Constitutional - No acute distress  HEENT - Grossly normal  Neck - supple.   Cardiovascular - JVP normal  Respiratory - Clear  Musculoskeletal - Peripheral edema no  Dermatologic/Skin - Skin warm and dry.  No rashes.    Neurologic - No acute neurological deficit  Psychiatric - AAOx3    Assessment and Plan  1. CKD Stage 3A:     Urine Protein Creatinine Ratios:    Urine Protein/Creatinine Ratio   Date Value Ref Range Status   07/16/2025 0.07 <=0.20 Final     Prot/Creat Ratio, Urine   Date Value Ref Range Status   08/09/2024 Unable to calculate 0.00 - 0.20 Final   06/24/2024 0.05 0.00 - 0.20 Final   04/01/2024 0.10 0.00 - 0.20 Final          Acid-Base:   Lab Results   Component Value Date     07/16/2025    K 4.7 07/16/2025    CO2 23 07/16/2025     -Counseled to avoid NSAIDs  -Counseled to drink 50-55 oz a day  -Will repeat US for minimally complex cyst    2. HTN: BP goal 130/80 mmHg  -Counseled for low salt diet  -Continue diltiazem 120 mg daily, lisinopril 5 mg daily    3. Secondary hyperparathyroidism   Lab Results   Component Value Date    .5 (H) 06/24/2024    CALCIUM 9.2 07/16/2025    CALCIUM 9.1 05/09/2025    PHOS 3.7 08/09/2024    PHOS 4.3 06/24/2024     Vit D, 25-Hydroxy   Date Value Ref Range Status   12/21/2023 54 30 - 96 ng/mL Final     Comment:     Vitamin D deficiency.........<10 ng/mL                              Vitamin D insufficiency......10-29 ng/mL       Vitamin D sufficiency........> or equal to 30 ng/mL  Vitamin D toxicity............>100 ng/mL     Adequate high PTH iso CKD  Will continue to monitor    4. No Anemia:   Lab Results   Component Value Date    HGB 11.5 (L) 07/16/2025    FERRITIN 30 11/19/2021        5. No DM:  Last HbA1C   Lab Results   Component Value Date    HGBA1C 5.3 05/09/2025       6. Lipid management:   Lab Results   Component Value Date    LDLCALC 54.2 (L) 05/09/2025   Continue statin    ESRD planing when eGFR < 15   Will consider txp referral when eGFR < 20    Follow up in 6 months

## 2025-07-17 ENCOUNTER — OFFICE VISIT (OUTPATIENT)
Dept: NEPHROLOGY | Facility: CLINIC | Age: 75
End: 2025-07-17
Payer: MEDICARE

## 2025-07-17 VITALS
OXYGEN SATURATION: 98 % | DIASTOLIC BLOOD PRESSURE: 80 MMHG | BODY MASS INDEX: 30.24 KG/M2 | HEART RATE: 93 BPM | WEIGHT: 176.13 LBS | SYSTOLIC BLOOD PRESSURE: 116 MMHG

## 2025-07-17 DIAGNOSIS — I10 HYPERTENSION, UNSPECIFIED TYPE: ICD-10-CM

## 2025-07-17 DIAGNOSIS — N28.1 COMPLEX RENAL CYST: ICD-10-CM

## 2025-07-17 DIAGNOSIS — N18.31 STAGE 3A CHRONIC KIDNEY DISEASE: Primary | ICD-10-CM

## 2025-07-17 PROCEDURE — 99999 PR PBB SHADOW E&M-EST. PATIENT-LVL III: CPT | Mod: PBBFAC,,, | Performed by: INTERNAL MEDICINE

## 2025-07-17 PROCEDURE — 99213 OFFICE O/P EST LOW 20 MIN: CPT | Mod: PBBFAC | Performed by: INTERNAL MEDICINE

## 2025-07-31 ENCOUNTER — HOSPITAL ENCOUNTER (OUTPATIENT)
Dept: RADIOLOGY | Facility: HOSPITAL | Age: 75
Discharge: HOME | End: 2025-07-31
Attending: INTERNAL MEDICINE
Payer: MEDICARE

## 2025-07-31 DIAGNOSIS — N18.31 STAGE 3A CHRONIC KIDNEY DISEASE: ICD-10-CM

## 2025-07-31 DIAGNOSIS — N28.1 COMPLEX RENAL CYST: ICD-10-CM

## 2025-07-31 PROCEDURE — 76770 US EXAM ABDO BACK WALL COMP: CPT | Mod: 26,,, | Performed by: RADIOLOGY

## 2025-07-31 PROCEDURE — 76770 US EXAM ABDO BACK WALL COMP: CPT | Mod: TC

## 2025-08-07 ENCOUNTER — PATIENT MESSAGE (OUTPATIENT)
Dept: NEPHROLOGY | Facility: CLINIC | Age: 75
End: 2025-08-07
Payer: MEDICARE

## (undated) DEVICE — PACK PACER PERMANENT OMC

## (undated) DEVICE — SLING SWATHE UNIVERSAL FOAM

## (undated) DEVICE — SLITTER UNIVERSAL

## (undated) DEVICE — CATH ANGIO RIGHTSITE HIS 7X43

## (undated) DEVICE — GUIDEWIRE EMERALD 150CM PTFE

## (undated) DEVICE — ELECTRODE REM PLYHSV RETURN 9

## (undated) DEVICE — PAD DEFIB CADENCE ADULT R2

## (undated) DEVICE — DRESSING AQUACEL AG ADV 3.5X6

## (undated) DEVICE — SHEATH SAFE ULTRA 7FR

## (undated) DEVICE — ADHESIVE DERMABOND ADVANCED

## (undated) DEVICE — DRAPE INCISE IOBAN 2 23X17IN